# Patient Record
Sex: FEMALE | Race: WHITE | NOT HISPANIC OR LATINO | Employment: UNEMPLOYED | URBAN - NONMETROPOLITAN AREA
[De-identification: names, ages, dates, MRNs, and addresses within clinical notes are randomized per-mention and may not be internally consistent; named-entity substitution may affect disease eponyms.]

---

## 2017-12-06 ENCOUNTER — OFFICE VISIT (OUTPATIENT)
Dept: GASTROENTEROLOGY | Facility: CLINIC | Age: 37
End: 2017-12-06

## 2017-12-06 VITALS
WEIGHT: 195 LBS | HEART RATE: 96 BPM | DIASTOLIC BLOOD PRESSURE: 76 MMHG | BODY MASS INDEX: 38.28 KG/M2 | HEIGHT: 60 IN | SYSTOLIC BLOOD PRESSURE: 129 MMHG

## 2017-12-06 DIAGNOSIS — R14.0 BLOATING: ICD-10-CM

## 2017-12-06 DIAGNOSIS — R11.0 NAUSEA: ICD-10-CM

## 2017-12-06 DIAGNOSIS — R10.84 GENERALIZED ABDOMINAL PAIN: Primary | ICD-10-CM

## 2017-12-06 DIAGNOSIS — K59.04 CHRONIC IDIOPATHIC CONSTIPATION: ICD-10-CM

## 2017-12-06 DIAGNOSIS — R19.4 CHANGE IN BOWEL HABITS: ICD-10-CM

## 2017-12-06 DIAGNOSIS — K92.1 BLOOD IN STOOL: ICD-10-CM

## 2017-12-06 PROCEDURE — 99214 OFFICE O/P EST MOD 30 MIN: CPT | Performed by: NURSE PRACTITIONER

## 2017-12-06 RX ORDER — TRAZODONE HYDROCHLORIDE 150 MG/1
TABLET ORAL
Refills: 2 | COMMUNITY
Start: 2017-12-01 | End: 2018-01-05 | Stop reason: SDUPTHER

## 2017-12-06 RX ORDER — LUBIPROSTONE 8 UG/1
8 CAPSULE ORAL 2 TIMES DAILY WITH MEALS
Qty: 60 CAPSULE | Refills: 5 | Status: SHIPPED | OUTPATIENT
Start: 2017-12-06 | End: 2018-01-05 | Stop reason: SINTOL

## 2017-12-06 RX ORDER — CARBAMAZEPINE 400 MG/1
300 TABLET, EXTENDED RELEASE ORAL 2 TIMES DAILY
COMMUNITY
End: 2020-06-26 | Stop reason: DRUGHIGH

## 2017-12-06 RX ORDER — DEXTROSE AND SODIUM CHLORIDE 5; .45 G/100ML; G/100ML
30 INJECTION, SOLUTION INTRAVENOUS CONTINUOUS PRN
Status: CANCELLED | OUTPATIENT
Start: 2018-02-08

## 2017-12-06 RX ORDER — FLUOXETINE 10 MG/1
10 CAPSULE ORAL DAILY
COMMUNITY
End: 2019-11-12

## 2017-12-06 RX ORDER — MEGESTROL ACETATE 40 MG/1
TABLET ORAL
Refills: 10 | COMMUNITY
Start: 2017-11-22 | End: 2018-01-05 | Stop reason: SDUPTHER

## 2017-12-06 RX ORDER — BUSPIRONE HYDROCHLORIDE 15 MG/1
TABLET ORAL
Refills: 2 | COMMUNITY
Start: 2017-12-03 | End: 2018-01-05 | Stop reason: SDUPTHER

## 2017-12-06 NOTE — PROGRESS NOTES
Chief Complaint   Patient presents with   • Constipation   • Black or Bloody Stool       Subjective    Gosia Brown is a 37 y.o. female. she is being seen for consultation today at the request of SCOTT Hsieh    Constipation   This is a new problem. The current episode started more than 1 month ago. The problem has been gradually worsening since onset. Her stool frequency is 1 time per week or less (reports every 2-3 weeks ). The stool is described as blood tinged and pellet like. The patient is on a high fiber diet. She exercises regularly. There has been adequate water intake. Associated symptoms include abdominal pain, bloating, flatus, hematochezia, hemorrhoids and nausea. Pertinent negatives include no diarrhea, difficulty urinating, fecal incontinence, fever, melena, rectal pain, vomiting or weight loss. She has tried fiber and laxatives for the symptoms. The treatment provided mild relief.   37-year-old female presents to discuss change of bowel movements that started about 6 months ago.  States her bowels have always been somewhat irregular and she would only go about 1 time per week now states it has lessened to about every 2-3 weeks and she is had some loose stool.  States  blood is bright red and is mostly associated with hemorrhoids denies any melena.  Reports she has noticed some light colored mucus in stool.  Wakes up with severe bloating and nausea.  Over the last 6 months she has increased dietary fiber and added a fiber supplement to her regimen daily.  She has always taken stool softener such as Colace as MiraLAX as needed and over-the-counter laxative such as senna and schedule.  These all have provided no relief.  She was seen by care and had an x-ray which showed no signs of obstruction.  Plan; we'll schedule EGD due to epigastric pain noted on exam, nausea and colonoscopy due to changes in bowel habits, abdominal pain, blood and mucus in stool.     The following portions of the  patient's history were reviewed and updated as appropriate:   Past Medical History:   Diagnosis Date   • Anemia    • Anxiety    • Arthritis      Past Surgical History:   Procedure Laterality Date   • EYE SURGERY Right    • TUBAL ABDOMINAL LIGATION       Family History   Problem Relation Age of Onset   • Arthritis Mother    • Hypertension Mother    • Ovarian cancer Mother    • Breast cancer Maternal Aunt    • Colon cancer Maternal Uncle    • Lung cancer Maternal Uncle    • Heart disease Maternal Grandfather    • Stroke Maternal Grandfather    • Lung cancer Maternal Aunt      OB History     No data available        Current Outpatient Prescriptions   Medication Sig Dispense Refill   • busPIRone (BUSPAR) 15 MG tablet TK 1 T PO QID  2   • carBAMazepine XR (TEGretol  XR) 400 MG 12 hr tablet Take 400 mg by mouth 2 (Two) Times a Day.     • FLUoxetine (PROzac) 10 MG capsule Take 10 mg by mouth.     • megestrol (MEGACE) 40 MG tablet TK 1 T PO QD.  10   • traZODone (DESYREL) 150 MG tablet TK 2 TS PO AS DIRECTED  2   • lubiprostone (AMITIZA) 8 MCG capsule Take 1 capsule by mouth 2 (Two) Times a Day With Meals. 60 capsule 5   • polyethylene glycol (GoLYTELY) 236 g solution Starting on day prior to procedure, drink 8 ounces every 30 minutes-half gallon repeat next morning at specified time until gone 4000 mL 0     No current facility-administered medications for this visit.      No Known Allergies  Social History     Social History   • Marital status: Single     Spouse name: N/A   • Number of children: N/A   • Years of education: N/A     Social History Main Topics   • Smoking status: Never Smoker   • Smokeless tobacco: Never Used   • Alcohol use No   • Drug use: No   • Sexual activity: Defer     Other Topics Concern   • None     Social History Narrative       Review of Systems  Review of Systems   Constitutional: Negative for activity change, appetite change, chills, diaphoresis, fatigue, fever, unexpected weight change and  "weight loss.   HENT: Negative for sore throat and trouble swallowing.    Respiratory: Negative for shortness of breath.    Gastrointestinal: Positive for abdominal pain, bloating, blood in stool, constipation, flatus, hematochezia, hemorrhoids and nausea. Negative for abdominal distention, anal bleeding, diarrhea, melena, rectal pain and vomiting.   Genitourinary: Negative for difficulty urinating.   Musculoskeletal: Negative for arthralgias.   Skin: Negative for pallor.   Neurological: Negative for light-headedness.        /76 (BP Location: Right arm, Patient Position: Sitting, Cuff Size: Adult)  Pulse 96  Ht 152.4 cm (60\")  Wt 88.5 kg (195 lb)  BMI 38.08 kg/m2    Objective    Physical Exam   Constitutional: She is oriented to person, place, and time. She appears well-developed and well-nourished. She is cooperative. No distress.   HENT:   Head: Normocephalic and atraumatic.   Neck: Normal range of motion. Neck supple. No thyromegaly present.   Cardiovascular: Normal rate, regular rhythm and normal heart sounds.    Pulmonary/Chest: Effort normal and breath sounds normal. She has no wheezes. She has no rhonchi. She has no rales.   Abdominal: Soft. Normal appearance and bowel sounds are normal. She exhibits no shifting dullness and no distension. There is no hepatosplenomegaly. There is tenderness in the right lower quadrant, epigastric area, periumbilical area and left lower quadrant. There is no rigidity and no guarding. No hernia.   Lymphadenopathy:     She has no cervical adenopathy.   Neurological: She is alert and oriented to person, place, and time.   Skin: Skin is warm, dry and intact. No rash noted. No pallor.   Psychiatric: She has a normal mood and affect. Her speech is normal.     Hospital Outpatient Visit on 06/24/2016   Component Date Value Ref Range Status   • Total Protein 06/24/2016 8.2  6.3 - 8.6 gm/dl Final   • Albumin 06/24/2016 4.7  3.4 - 4.8 gm/dl Final   • Bilirubin, Direct 06/24/2016 " 0.0  0.0 - 0.3 mg/dl Final   • Total Bilirubin 06/24/2016 0.3  0.2 - 1.3 mg/dl Final   • Alkaline Phosphatase 06/24/2016 69  38 - 126 U/L Final   • AST (SGOT) 06/24/2016 22  14 - 36 U/L Final   • ALT (SGPT) 06/24/2016 20  9 - 52 U/L Final   • Sodium 06/24/2016 142  137 - 145 mmol/L Final   • Potassium 06/24/2016 3.5  3.5 - 5.1 mmol/L Final   • Chloride 06/24/2016 99  95 - 110 mmol/L Final   • CO2 06/24/2016 26  22 - 31 mmol/L Final   • Anion Gap 06/24/2016 17.0* 5.0 - 15.0 mmol/L Final   • Glucose 06/24/2016 101* 60 - 100 mg/dl Final   • BUN 06/24/2016 9  7 - 21 mg/dl Final   • Creatinine 06/24/2016 0.7  0.5 - 1.0 mg/dl Final   • GFR MDRD Non  06/24/2016 95  64 - 149 mL/min/1.73 sq.M Final    Comment: Invalid if creatinine is changing or the patient is on dialysis. Use AA  result if patient is -American, non AA result otherwise.     • GFR MDRD  06/24/2016 115  64 - 149 mL/min/1.73 sq.M Final   • Calcium 06/24/2016 9.3  8.4 - 10.2 mg/dl Final   • Total Protein 06/24/2016 8.2  6.3 - 8.6 gm/dl Final   • Albumin 06/24/2016 4.7  3.4 - 4.8 gm/dl Final   • Total Bilirubin 06/24/2016 0.3  0.2 - 1.3 mg/dl Final   • Alkaline Phosphatase 06/24/2016 69  38 - 126 U/L Final   • AST (SGOT) 06/24/2016 22  14 - 36 U/L Final   • ALT (SGPT) 06/24/2016 20  9 - 52 U/L Final   • Valproic Acid 06/24/2016 <10.0* 50.0 - 120.0 ug/ml Final     Assessment/Plan      1. Generalized abdominal pain    2. Nausea    3. Bloating    4. Change in bowel habits    5. Blood in stool    6. Chronic idiopathic constipation    .       Orders placed during this encounter include:      ESOPHAGOGASTRODUODENOSCOPY (N/A), COLONOSCOPY (N/A)    Review and/or summary of lab tests, radiology, procedures, medications. Review and summary of old records and obtaining of history. The risks and benefits of my recommendations, as well as other treatment options were discussed with the patient today. Questions were answered.    New  Medications Ordered This Visit   Medications   • polyethylene glycol (GoLYTELY) 236 g solution     Sig: Starting on day prior to procedure, drink 8 ounces every 30 minutes-half gallon repeat next morning at specified time until gone     Dispense:  4000 mL     Refill:  0   • lubiprostone (AMITIZA) 8 MCG capsule     Sig: Take 1 capsule by mouth 2 (Two) Times a Day With Meals.     Dispense:  60 capsule     Refill:  5       Follow-up: Return in about 4 weeks (around 1/3/2018).          This document has been electronically signed by SCOTT Payan on December 6, 2017 2:29 PM             Results for orders placed or performed during the hospital encounter of 06/24/16   Valproic acid level, total   Result Value Ref Range    Valproic Acid <10.0 (L) 50.0 - 120.0 ug/ml   Hepatic function panel   Result Value Ref Range    Total Protein 8.2 6.3 - 8.6 gm/dl    Albumin 4.7 3.4 - 4.8 gm/dl    Bilirubin, Direct 0.0 0.0 - 0.3 mg/dl    Total Bilirubin 0.3 0.2 - 1.3 mg/dl    Alkaline Phosphatase 69 38 - 126 U/L    AST (SGOT) 22 14 - 36 U/L    ALT (SGPT) 20 9 - 52 U/L   Comprehensive metabolic panel   Result Value Ref Range    Sodium 142 137 - 145 mmol/L    Potassium 3.5 3.5 - 5.1 mmol/L    Chloride 99 95 - 110 mmol/L    CO2 26 22 - 31 mmol/L    Anion Gap 17.0 (H) 5.0 - 15.0 mmol/L    Glucose 101 (H) 60 - 100 mg/dl    BUN 9 7 - 21 mg/dl    Creatinine 0.7 0.5 - 1.0 mg/dl    GFR MDRD Non  95 64 - 149 mL/min/1.73 sq.M    GFR MDRD  115 64 - 149 mL/min/1.73 sq.M    Calcium 9.3 8.4 - 10.2 mg/dl    Total Protein 8.2 6.3 - 8.6 gm/dl    Albumin 4.7 3.4 - 4.8 gm/dl    Total Bilirubin 0.3 0.2 - 1.3 mg/dl    Alkaline Phosphatase 69 38 - 126 U/L    AST (SGOT) 22 14 - 36 U/L    ALT (SGPT) 20 9 - 52 U/L   Results for orders placed or performed during the hospital encounter of 05/17/15   Urinalysis, Microscopic only   Result Value Ref Range    WBC, UA 3-5 0-5,0,RARE,1-2,2-4,3-5  /HPF    RBC, UA 0 0-2,0,RARE  /HPF     Bacteria, UA TRACE 0,TRACE  /HPF    Epithelial Cells, UA 1+ Squamous  /HPF   Rapid drug screen, urine   Result Value Ref Range    Barbiturates Screen, Urine Negative NEGATIVE    Benzodiazepine Screen, Urine Negative NEGATIVE    Opiate Screen, Urine Negative NEGATIVE    THC Screen Interpretation Negative NEGATIVE    Amphet/Methamphet, Screen, Urine Negative NEGATIVE    Cocaine Screen, Urine Negative NEGATIVE    Oxycodone Screen, Urine Negative NEGATIVE    Methadone Screen, Urine Negative NEGATIVE   Pregnancy, urine   Result Value Ref Range    HCG Urine, QL Negative    Urinalysis Without Microscopic   Result Value Ref Range    Color, UA YELLOW     Appearance CLEAR     Specific Gravity, UA 1.007 1.003 - 1.030    pH, UA 8.0 pH Units    Leukocytes, UA 1+ (A) NEGATIVE    Nitrite, UA NEGATIVE NEGATIVE    Protein, UA NEGATIVE NEGATIVE    Glucose, Urine NEGATIVE NEGATIVE mg/dl    Ketones, UA NEGATIVE NEGATIVE    Urobilinogen, UA 0.2 0.2 EU/dl    Blood, UA NEGATIVE NEGATIVE   CBC and Differential   Result Value Ref Range    WBC 7.9 3.2 - 9.8 x1000/uL    RBC 4.66 3.77 - 5.16 andie/mm3    Hemoglobin 10.6 (L) 12.0 - 15.5 gm/dl    Hematocrit 34.3 (L) 35.0 - 45.0 %    MCV 73.6 (L) 80.0 - 98.0 fl    MCH 22.7 (L) 26.0 - 34.0 pg    MCHC 30.9 (L) 31.4 - 36.0 gm/dl    RDW 15.2 (H) 11.5 - 14.5 %    Platelets 420 150 - 450 x1000/mm3    MPV 8.6 8.0 - 12.0 fl    Neutrophil Rel % 62.1 37.0 - 80.0 %    Lymphocyte Rel % 26.4 10.0 - 50.0 %    Monocyte Rel % 9.4 0.0 - 12.0 %    Eosinophil Rel % 1.5 0.0 - 7.0 %    Basophil Rel % 0.5 0.0 - 2.0 %    Immature Granulocyte Rel % 0.10 0.00 - 0.50 %    Neutrophils Absolute 4.88 2.00 - 8.60 x1000/uL    Lymphocytes Absolute 2.08 0.60 - 4.20 x1000/uL    Monocytes Absolute 0.74 0.00 - 0.90 x1000/uL    Eosinophils Absolute 0.12 0.00 - 0.70 x1000/uL    Basophils Absolute 0.04 0.00 - 0.20 x1000/uL    Immature Granulocytes Absolute 0.010 0.005 - 0.022 x1000/uL   Comprehensive metabolic panel   Result Value  Ref Range    Sodium 138 137 - 145 mmol/L    Potassium 4.0 3.5 - 5.1 mmol/L    Chloride 100 95 - 110 mmol/L    CO2 26 22 - 31 mmol/L    Anion Gap 12.0 5.0 - 15.0 mmol/L    Glucose 82 60 - 100 mg/dl    BUN 6 (L) 7 - 21 mg/dl    Creatinine 0.7 0.5 - 1.0 mg/dl    GFR MDRD Non  95 64 - 149 mL/min/1.73 sq.M    GFR MDRD  115 64 - 149 mL/min/1.73 sq.M    Calcium 9.0 8.4 - 10.2 mg/dl    Total Protein 8.4 6.3 - 8.6 gm/dl    Albumin 4.8 3.4 - 4.8 gm/dl    Total Bilirubin 0.6 0.2 - 1.3 mg/dl    Alkaline Phosphatase 61 38 - 126 U/L    AST (SGOT) 40 (H) 14 - 36 U/L    ALT (SGPT) 17 9 - 52 U/L   Results for orders placed or performed during the hospital encounter of 08/02/14   Urinalysis, Microscopic only   Result Value Ref Range    WBC, UA 1-2 0-5,0,RARE,1-2,2-4,3-5  /HPF    RBC, UA 0 0-2,0,RARE  /HPF    Bacteria, UA 0 0,TRACE  /HPF   Rapid drug screen, urine   Result Value Ref Range    Barbiturates Screen, Urine Negative NEGATIVE    Benzodiazepine Screen, Urine Negative NEGATIVE    Opiate Screen, Urine Negative NEGATIVE    THC Screen Interpretation Negative NEGATIVE    Amphet/Methamphet, Screen, Urine Negative NEGATIVE    Cocaine Screen, Urine Negative NEGATIVE    Oxycodone Screen, Urine Negative NEGATIVE    Methadone Screen, Urine Negative NEGATIVE   Pregnancy, urine   Result Value Ref Range    HCG Urine, QL Negative      *Note: Due to a large number of results and/or encounters for the requested time period, some results have not been displayed. A complete set of results can be found in Results Review.

## 2017-12-12 ENCOUNTER — TELEPHONE (OUTPATIENT)
Dept: GASTROENTEROLOGY | Facility: CLINIC | Age: 37
End: 2017-12-12

## 2017-12-12 NOTE — TELEPHONE ENCOUNTER
12/12/2017, 1623 - Patient's pharmacy of choice, [place], [place], [phone] telephoned per this staff member with notification of patient, Gosia Brown, 1980, Prior Authorization approval received via facsimile [email or web address] Amitiza 8 MCG Capsules with approved quantity of 60 per 30 days and approved time frame 12/06/2017 - further notice.  Spoke with Summer.  Understanding verbalized with statement per Summer prescription medication had been successfully processed.    12/12/2017, 1625 - Patient telephoned per this staff member [phone] with notification of Prior Authorization approval received via facsimile per [facility] regarding Amitiza 8 MCG Capsules with approved quantity of 60 per 30 days and approved time frame 12/06/2017 - further notice.  Understanding verbalized with patient statement she will retreive prescription medication tomorrow morning, Wednesday, December 13, 2017.

## 2018-01-19 ENCOUNTER — TELEPHONE (OUTPATIENT)
Dept: GASTROENTEROLOGY | Facility: CLINIC | Age: 38
End: 2018-01-19

## 2018-01-19 NOTE — TELEPHONE ENCOUNTER
----- Message from Lata Angeles MA sent at 1/19/2018  9:26 AM CST -----  Contact: [phone]  Patient states she called 3 weeks ago and left a message for someone to call her back regarding her Amitiza and she has not had a return call. She states that the medication was not working and was making her very sick when she did take it. Patient would like a call back. She rescheduled her office appt that was scheduled Monday because her endo procedure was rescheduled until 2-8-18 due to the weather.

## 2018-01-19 NOTE — TELEPHONE ENCOUNTER
Spoke with patient in regards to her calling.  I notified patient of who I was where I was calling from. I then notified patient after to speaking with lavon CHAVEZ we would send in some Linzess 145 to her pharmacy. I verified patient pharmacy and notified her to inform us of her feedback in the Linzess. Patient verbalized her understanding and had no further questions at the moment.

## 2018-01-22 ENCOUNTER — APPOINTMENT (OUTPATIENT)
Dept: LAB | Facility: HOSPITAL | Age: 38
End: 2018-01-22

## 2018-01-22 ENCOUNTER — TRANSCRIBE ORDERS (OUTPATIENT)
Dept: LAB | Facility: HOSPITAL | Age: 38
End: 2018-01-22

## 2018-01-22 DIAGNOSIS — Z79.899 ENCOUNTER FOR LONG-TERM (CURRENT) USE OF HIGH-RISK MEDICATION: Primary | ICD-10-CM

## 2018-01-22 LAB
ALBUMIN SERPL-MCNC: 4.3 G/DL (ref 3.4–4.8)
ALP SERPL-CCNC: 94 U/L (ref 38–126)
ALT SERPL W P-5'-P-CCNC: 37 U/L (ref 9–52)
AST SERPL-CCNC: 35 U/L (ref 14–36)
BILIRUB CONJ SERPL-MCNC: 0 MG/DL (ref 0–0.3)
BILIRUB INDIRECT SERPL-MCNC: 0 MG/DL (ref 0–1.1)
BILIRUB SERPL-MCNC: 0.1 MG/DL (ref 0.2–1.3)
CARBAMAZEPINE SERPL-MCNC: 6.6 MCG/ML (ref 4–12)
PROT SERPL-MCNC: 7.6 G/DL (ref 6.3–8.6)

## 2018-01-22 PROCEDURE — 36415 COLL VENOUS BLD VENIPUNCTURE: CPT | Performed by: NURSE PRACTITIONER

## 2018-01-22 PROCEDURE — 80156 ASSAY CARBAMAZEPINE TOTAL: CPT | Performed by: NURSE PRACTITIONER

## 2018-01-22 PROCEDURE — 80076 HEPATIC FUNCTION PANEL: CPT | Performed by: NURSE PRACTITIONER

## 2018-02-19 ENCOUNTER — DOCUMENTATION (OUTPATIENT)
Dept: GASTROENTEROLOGY | Facility: CLINIC | Age: 38
End: 2018-02-19

## 2018-02-19 ENCOUNTER — TELEPHONE (OUTPATIENT)
Dept: GASTROENTEROLOGY | Facility: CLINIC | Age: 38
End: 2018-02-19

## 2018-02-19 NOTE — TELEPHONE ENCOUNTER
02/19/2018, 0932 - Patient's pharmacy of choice, [facility] [place], [place] (270) telephoned per this staff member [phone].  Spoke with Pharmacy TechnicianOndina.  Prescription medication, Linzess 145 MCG, non-formulary.      02/19/2018, 0938 - Patient telephoned per this staff member [phone].  Zero answer.  Voice message submitted with date, time, office contact information, and instruction to return this staff member's telephone call.    02/19/2018, 1146 - Prior Authorization request regarding Linzess 145 MCG resubmitted via Cover My Meds.

## 2018-02-20 RX ORDER — LACTULOSE 10 G/15ML
10 SOLUTION ORAL 2 TIMES DAILY PRN
Qty: 900 ML | Refills: 1 | Status: SHIPPED | OUTPATIENT
Start: 2018-02-20 | End: 2019-07-12

## 2018-03-07 ENCOUNTER — TELEPHONE (OUTPATIENT)
Dept: GASTROENTEROLOGY | Facility: CLINIC | Age: 38
End: 2018-03-07

## 2018-03-07 NOTE — TELEPHONE ENCOUNTER
"03/07/2018, 1046 - Patient telephoned per this staff member [phone] to ensure patient aware of Prior Authorization denial regarding prescription medication, Linzess 145 MCG Capsule. Facsimile received per [facility] [employer] stating patient must show trial and failure with preferred drug Lactulose (Enulose).  Lactulose 10 GM / 15 ML Solution, 15 ML by mouth 2 times per day as needed for Constipation submitted via E-Scribe per office of SCOTT Dudley [facility] [place] [place]  Patient stated she collected prescription medication, Lactulose, from pharmacy \"last week\".  "

## 2019-01-22 ENCOUNTER — TRANSCRIBE ORDERS (OUTPATIENT)
Dept: ORTHOPEDIC SURGERY | Facility: CLINIC | Age: 39
End: 2019-01-22

## 2019-01-22 DIAGNOSIS — M25.562 ACUTE PAIN OF LEFT KNEE: Primary | ICD-10-CM

## 2019-03-11 ENCOUNTER — TRANSCRIBE ORDERS (OUTPATIENT)
Dept: LAB | Facility: OTHER | Age: 39
End: 2019-03-11

## 2019-03-11 ENCOUNTER — TRANSCRIBE ORDERS (OUTPATIENT)
Dept: GENERAL RADIOLOGY | Facility: CLINIC | Age: 39
End: 2019-03-11

## 2019-03-11 ENCOUNTER — LAB (OUTPATIENT)
Dept: LAB | Facility: OTHER | Age: 39
End: 2019-03-11

## 2019-03-11 DIAGNOSIS — Z79.899 ENCOUNTER FOR LONG-TERM (CURRENT) USE OF HIGH-RISK MEDICATION: Primary | ICD-10-CM

## 2019-03-11 DIAGNOSIS — Z79.899 ENCOUNTER FOR LONG-TERM (CURRENT) USE OF HIGH-RISK MEDICATION: ICD-10-CM

## 2019-03-11 DIAGNOSIS — R51.9 NONINTRACTABLE HEADACHE, UNSPECIFIED CHRONICITY PATTERN, UNSPECIFIED HEADACHE TYPE: Primary | ICD-10-CM

## 2019-03-11 LAB
ALBUMIN SERPL-MCNC: 4.5 G/DL (ref 3.5–5)
ALBUMIN/GLOB SERPL: 1.4 G/DL (ref 1.1–1.8)
ALP SERPL-CCNC: 80 U/L (ref 38–126)
ALT SERPL W P-5'-P-CCNC: 14 U/L
AMPHET+METHAMPHET UR QL: NEGATIVE
ANION GAP SERPL CALCULATED.3IONS-SCNC: 9 MMOL/L (ref 5–15)
AST SERPL-CCNC: 18 U/L (ref 14–36)
BARBITURATES UR QL SCN: NEGATIVE
BENZODIAZ UR QL SCN: NEGATIVE
BILIRUB CONJ SERPL-MCNC: <0 MG/DL (ref 0–0.3)
BILIRUB SERPL-MCNC: 0.2 MG/DL (ref 0.2–1.3)
BILIRUB UR QL STRIP: NEGATIVE
BUN BLD-MCNC: 13 MG/DL (ref 7–17)
BUN/CREAT SERPL: 16.9 (ref 7–25)
CALCIUM SPEC-SCNC: 8.9 MG/DL (ref 8.4–10.2)
CANNABINOIDS SERPL QL: NEGATIVE
CARBAMAZEPINE SERPL-MCNC: 6.1 MCG/ML (ref 4–12)
CHLORIDE SERPL-SCNC: 101 MMOL/L (ref 98–107)
CHOLEST SERPL-MCNC: 201 MG/DL (ref 150–200)
CLARITY UR: CLEAR
CO2 SERPL-SCNC: 29 MMOL/L (ref 22–30)
COCAINE UR QL: NEGATIVE
COLOR UR: YELLOW
CREAT BLD-MCNC: 0.77 MG/DL (ref 0.52–1.04)
GFR SERPL CREATININE-BSD FRML MDRD: 83 ML/MIN/1.73 (ref 64–149)
GLOBULIN UR ELPH-MCNC: 3.3 GM/DL (ref 2.3–3.5)
GLUCOSE BLD-MCNC: 103 MG/DL (ref 74–99)
GLUCOSE UR STRIP-MCNC: NEGATIVE MG/DL
HDLC SERPL-MCNC: 50 MG/DL (ref 40–59)
HGB UR QL STRIP.AUTO: NEGATIVE
KETONES UR QL STRIP: ABNORMAL
LDLC SERPL CALC-MCNC: 103 MG/DL
LDLC/HDLC SERPL: 2.07 {RATIO} (ref 0–3.22)
LEUKOCYTE ESTERASE UR QL STRIP.AUTO: NEGATIVE
LITHIUM SERPL-SCNC: <0.2 MMOL/L (ref 0.6–1.2)
METHADONE UR QL SCN: NEGATIVE
NITRITE UR QL STRIP: NEGATIVE
OPIATES UR QL: NEGATIVE
OXYCODONE UR QL SCN: NEGATIVE
PH UR STRIP.AUTO: 6 [PH] (ref 5.5–8)
POTASSIUM BLD-SCNC: 3.5 MMOL/L (ref 3.4–5)
PROT SERPL-MCNC: 7.8 G/DL (ref 6.3–8.2)
PROT UR QL STRIP: NEGATIVE
SODIUM BLD-SCNC: 139 MMOL/L (ref 137–145)
SP GR UR STRIP: >=1.03 (ref 1–1.03)
TRIGL SERPL-MCNC: 238 MG/DL
UROBILINOGEN UR QL STRIP: ABNORMAL
VLDLC SERPL-MCNC: 47.6 MG/DL

## 2019-03-11 PROCEDURE — 80053 COMPREHEN METABOLIC PANEL: CPT | Performed by: INTERNAL MEDICINE

## 2019-03-11 PROCEDURE — 80307 DRUG TEST PRSMV CHEM ANLYZR: CPT | Performed by: NURSE PRACTITIONER

## 2019-03-11 PROCEDURE — 80156 ASSAY CARBAMAZEPINE TOTAL: CPT | Performed by: NURSE PRACTITIONER

## 2019-03-11 PROCEDURE — 81003 URINALYSIS AUTO W/O SCOPE: CPT | Performed by: INTERNAL MEDICINE

## 2019-03-11 PROCEDURE — 80178 ASSAY OF LITHIUM: CPT | Performed by: NURSE PRACTITIONER

## 2019-03-11 PROCEDURE — 80061 LIPID PANEL: CPT | Performed by: INTERNAL MEDICINE

## 2019-03-11 PROCEDURE — 80165 DIPROPYLACETIC ACID FREE: CPT | Performed by: NURSE PRACTITIONER

## 2019-03-11 PROCEDURE — 84443 ASSAY THYROID STIM HORMONE: CPT | Performed by: NURSE PRACTITIONER

## 2019-03-11 PROCEDURE — 82248 BILIRUBIN DIRECT: CPT | Performed by: INTERNAL MEDICINE

## 2019-03-12 LAB — TSH SERPL DL<=0.05 MIU/L-ACNC: 1.4 MIU/ML (ref 0.46–4.68)

## 2019-03-14 LAB — VALPROATE FREE SERPL-MCNC: 2.2 UG/ML (ref 6–22)

## 2019-06-20 ENCOUNTER — OFFICE VISIT (OUTPATIENT)
Dept: ORTHOPEDIC SURGERY | Facility: CLINIC | Age: 39
End: 2019-06-20

## 2019-06-20 VITALS — BODY MASS INDEX: 37.3 KG/M2 | WEIGHT: 190 LBS | HEIGHT: 60 IN

## 2019-06-20 DIAGNOSIS — M23.91 INTERNAL DERANGEMENT OF RIGHT KNEE: Primary | ICD-10-CM

## 2019-06-20 DIAGNOSIS — M25.561 RIGHT KNEE PAIN, UNSPECIFIED CHRONICITY: ICD-10-CM

## 2019-06-20 PROCEDURE — 99214 OFFICE O/P EST MOD 30 MIN: CPT | Performed by: NURSE PRACTITIONER

## 2019-06-20 RX ORDER — MELOXICAM 15 MG/1
15 TABLET ORAL DAILY
Qty: 30 TABLET | Refills: 1 | Status: SHIPPED | OUTPATIENT
Start: 2019-06-20 | End: 2019-07-12

## 2019-06-20 NOTE — PROGRESS NOTES
"Gosia Brown is a 38 y.o. female returns for     Chief Complaint   Patient presents with   • Right Knee - Follow-up       HISTORY OF PRESENT ILLNESS:    38-year-old  female in the office today for right follow-up right knee pain.  She does report worsening of symptoms.  Originally I saw the patient 2016 for the right knee pain and she received intra-articular injections of steroids, physical therapy, modified weightbearing and activity modification over the past several years to control her pain however now these treatments are not helping.       CONCURRENT MEDICAL HISTORY:    The following portions of the patient's history were reviewed and updated as appropriate: allergies, current medications, past family history, past medical history, past social history, past surgical history and problem list.     ROS  No fevers or chills.  No chest pain or shortness of air.  No GI or  disturbances.    PHYSICAL EXAMINATION:       Ht 152.4 cm (60\")   Wt 86.2 kg (190 lb)   BMI 37.11 kg/m²     Physical Exam   Constitutional: She is oriented to person, place, and time. Vital signs are normal. She appears well-developed and well-nourished. She is cooperative.   HENT:   Head: Normocephalic and atraumatic.   Neck: Trachea normal and phonation normal.   Pulmonary/Chest: Effort normal. No respiratory distress.   Abdominal: Soft. Normal appearance. She exhibits no distension.   Musculoskeletal:        Right knee: She exhibits effusion.   Neurological: She is alert and oriented to person, place, and time. GCS eye subscore is 4. GCS verbal subscore is 5. GCS motor subscore is 6.   Skin: Skin is warm, dry and intact.   Psychiatric: She has a normal mood and affect. Her speech is normal and behavior is normal. Judgment and thought content normal. Cognition and memory are normal.   Vitals reviewed.      GAIT:     []  Normal  [x]  Antalgic    Assistive device: []  None  []  Walker     []  Crutches  []  Cane     []  " Wheelchair  []  Stretcher    Right Knee Exam     Tenderness   The patient is experiencing tenderness in the lateral joint line and medial joint line.    Range of Motion   Extension: abnormal   Flexion: abnormal     Tests   Bala:  Medial - positive Lateral - positive    Other   Erythema: absent  Scars: absent  Sensation: normal  Pulse: present  Swelling: mild  Effusion: effusion present      Left Knee Exam   Left knee exam is normal.    Muscle Strength   The patient has normal left knee strength.              No results found.          ASSESSMENT:    Diagnoses and all orders for this visit:    Internal derangement of right knee  -     MRI Knee Right Without Contrast; Future    Right knee pain, unspecified chronicity  -     MRI Knee Right Without Contrast; Future    Other orders  -     meloxicam (MOBIC) 15 MG tablet; Take 1 tablet by mouth Daily for 30 days.          PLAN  Continue to sharp stabbing pain in the medial aspect of the knee with limited range of motion and swelling.  Will recommend an MRI to rule out a meniscus tear.      No Follow-up on file.    Shan Dudley, APRN

## 2019-07-01 ENCOUNTER — HOSPITAL ENCOUNTER (OUTPATIENT)
Dept: MRI IMAGING | Facility: HOSPITAL | Age: 39
Discharge: HOME | End: 2019-07-01
Admitting: NURSE PRACTITIONER

## 2019-07-01 DIAGNOSIS — M23.91 INTERNAL DERANGEMENT OF RIGHT KNEE: ICD-10-CM

## 2019-07-01 DIAGNOSIS — M25.561 RIGHT KNEE PAIN, UNSPECIFIED CHRONICITY: ICD-10-CM

## 2019-07-01 PROCEDURE — 73721 MRI JNT OF LWR EXTRE W/O DYE: CPT

## 2019-07-12 ENCOUNTER — OFFICE VISIT (OUTPATIENT)
Dept: ORTHOPEDIC SURGERY | Facility: CLINIC | Age: 39
End: 2019-07-12

## 2019-07-12 VITALS — BODY MASS INDEX: 39.07 KG/M2 | WEIGHT: 199 LBS | HEIGHT: 60 IN

## 2019-07-12 DIAGNOSIS — M94.261 CHONDROMALACIA, KNEE, RIGHT: Primary | ICD-10-CM

## 2019-07-12 DIAGNOSIS — M25.561 RIGHT KNEE PAIN, UNSPECIFIED CHRONICITY: ICD-10-CM

## 2019-07-12 PROBLEM — M23.91 INTERNAL DERANGEMENT OF RIGHT KNEE: Status: ACTIVE | Noted: 2019-07-12

## 2019-07-12 PROCEDURE — 20610 DRAIN/INJ JOINT/BURSA W/O US: CPT | Performed by: NURSE PRACTITIONER

## 2019-07-12 PROCEDURE — 99214 OFFICE O/P EST MOD 30 MIN: CPT | Performed by: NURSE PRACTITIONER

## 2019-07-12 RX ADMIN — LIDOCAINE HYDROCHLORIDE 2 ML: 10 INJECTION, SOLUTION EPIDURAL; INFILTRATION; INTRACAUDAL; PERINEURAL at 14:10

## 2019-07-12 RX ADMIN — TRIAMCINOLONE ACETONIDE 80 MG: 40 INJECTION, SUSPENSION INTRA-ARTICULAR; INTRAMUSCULAR at 14:10

## 2019-07-12 NOTE — PROGRESS NOTES
"Gosia Brown is a 38 y.o. female returns for     Chief Complaint   Patient presents with   • Right Knee - Follow-up, Pain   • Results     mri right knee done on 7/1/2019       HISTORY OF PRESENT ILLNESS:  Follow up today after mri of the right knee for further evaluation of pain.    Symptoms have failed to improved despite, nsaids, rest and bracing.         CONCURRENT MEDICAL HISTORY:    The following portions of the patient's history were reviewed and updated as appropriate: allergies, current medications, past family history, past medical history, past social history, past surgical history and problem list.     ROS  No fevers or chills.  No chest pain or shortness of air.  No GI or  disturbances.    PHYSICAL EXAMINATION:       Ht 152.4 cm (60\")   Wt 90.3 kg (199 lb)   BMI 38.86 kg/m²     Physical Exam   Constitutional: She is oriented to person, place, and time. Vital signs are normal. She appears well-developed and well-nourished. She is cooperative.   HENT:   Head: Normocephalic and atraumatic.   Neck: Trachea normal and phonation normal.   Pulmonary/Chest: Effort normal. No respiratory distress.   Abdominal: Soft. Normal appearance. She exhibits no distension.   Musculoskeletal:        Right knee: She exhibits effusion.   Neurological: She is alert and oriented to person, place, and time. GCS eye subscore is 4. GCS verbal subscore is 5. GCS motor subscore is 6.   Skin: Skin is warm, dry and intact.   Psychiatric: She has a normal mood and affect. Her speech is normal and behavior is normal. Judgment and thought content normal. Cognition and memory are normal.   Vitals reviewed.      GAIT:     []  Normal  [x]  Antalgic    Assistive device: []  None  []  Walker     []  Crutches  []  Cane     []  Wheelchair  []  Stretcher    Right Knee Exam     Tenderness   The patient is experiencing tenderness in the lateral joint line and medial joint line.    Range of Motion   Extension: abnormal   Flexion: " abnormal     Tests   Bala:  Medial - positive Lateral - positive    Other   Erythema: absent  Scars: absent  Sensation: normal  Pulse: present  Swelling: mild  Effusion: effusion present      Left Knee Exam   Left knee exam is normal.    Muscle Strength   The patient has normal left knee strength.              Mri Knee Right Without Contrast    Result Date: 7/1/2019  Narrative: MRI right knee without contrast on 7/1/2019 CLINICAL INDICATION: Fell off ladder three years ago, knee pain since then that is worsening, swelling and popping TECHNIQUE: Multiplanar, multisequence MR images are obtained throughout the right knee without the administration of contrast. This examination was performed on a 1.5 Luly magnet. COMPARISON: Prior MRI from 7/21/2016 and x-ray from 8/24/2016 FINDINGS: There is a very small joint effusion. There is no Baker's cyst. Popliteus tendon is intact and unremarkable. There is mild chondromalacia without underlying reactive marrow change in the lateral patellar facet. There is some red marrow hyperplasia in the distal femur likely related to the patient's age and sex and weight but would recommend correlation with CBC to exclude anemia. The MCL and lateral collateral ligament complex are intact and unremarkable. The patellar and quadriceps tendons are intact and unremarkable. The ACL and PCL are intact and unremarkable. Bone marrow signal is otherwise unremarkable. No other cartilage abnormality is noted. The medial and lateral menisci are intact and unremarkable without evidence of a tear.     Impression: 1. Small joint effusion. 2. Mild red marrow hyperplasia in the distal femur likely just related to the patient's age and sex and weight but please correlate with CBC to exclude anemia. 3. Mild chondromalacia in the lateral patellar facet. Electronically signed by:  Shaun Lee  7/1/2019 9:11 PM CDT Workstation: [id number]      Large Joint Arthrocentesis: R knee  Date/Time: 7/12/2019  2:10 PM  Consent given by: patient  Site marked: site marked  Timeout: Immediately prior to procedure a time out was called to verify the correct patient, procedure, equipment, support staff and site/side marked as required   Supporting Documentation  Indications: pain   Procedure Details  Location: knee - R knee  Preparation: Patient was prepped and draped in the usual sterile fashion  Needle size: 22 G  Approach: anteromedial  Medications administered: 2 mL lidocaine PF 1% 1 %; 80 mg triamcinolone acetonide 40 MG/ML  Patient tolerance: patient tolerated the procedure well with no immediate complications            ASSESSMENT:    Diagnoses and all orders for this visit:    Chondromalacia, knee, right  -     Ambulatory Referral to Physical Therapy Evaluate and treat  -     Large Joint Arthrocentesis: R knee  -     lidocaine PF 1% (XYLOCAINE) injection 2 mL  -     triamcinolone acetonide (KENALOG-40) injection 80 mg    Right knee pain, unspecified chronicity  -     Ambulatory Referral to Physical Therapy Evaluate and treat  -     Large Joint Arthrocentesis: R knee  -     lidocaine PF 1% (XYLOCAINE) injection 2 mL  -     triamcinolone acetonide (KENALOG-40) injection 80 mg    Other orders  -     diclofenac (VOLTAREN) 50 MG EC tablet; Take 1 tablet by mouth 3 (Three) Times a Day for 30 days.          PLAN  Reviewed the mri results with the patient and discussed findings in detail    Recommended aggressive weight reduction  HEP/PT   Activity modification   Discussed knee injection in detail and the patient will and ready to proceed  Follow up in one month for recheck       No Follow-up on file.    Shan Dudley, APRN

## 2019-07-14 RX ORDER — TRIAMCINOLONE ACETONIDE 40 MG/ML
80 INJECTION, SUSPENSION INTRA-ARTICULAR; INTRAMUSCULAR
Status: COMPLETED | OUTPATIENT
Start: 2019-07-12 | End: 2019-07-12

## 2019-07-14 RX ORDER — LIDOCAINE HYDROCHLORIDE 10 MG/ML
2 INJECTION, SOLUTION EPIDURAL; INFILTRATION; INTRACAUDAL; PERINEURAL
Status: COMPLETED | OUTPATIENT
Start: 2019-07-12 | End: 2019-07-12

## 2019-10-24 ENCOUNTER — HOSPITAL ENCOUNTER (EMERGENCY)
Facility: HOSPITAL | Age: 39
Discharge: HOME | End: 2019-10-25
Attending: EMERGENCY MEDICINE | Admitting: EMERGENCY MEDICINE

## 2019-10-24 ENCOUNTER — APPOINTMENT (OUTPATIENT)
Dept: CT IMAGING | Facility: HOSPITAL | Age: 39
End: 2019-10-24

## 2019-10-24 ENCOUNTER — APPOINTMENT (OUTPATIENT)
Dept: GENERAL RADIOLOGY | Facility: HOSPITAL | Age: 39
End: 2019-10-24

## 2019-10-24 DIAGNOSIS — R07.89 ATYPICAL CHEST PAIN: Primary | ICD-10-CM

## 2019-10-24 LAB
ALBUMIN SERPL-MCNC: 4 G/DL (ref 3.5–5.2)
ALBUMIN/GLOB SERPL: 1.4 G/DL
ALP SERPL-CCNC: 66 U/L (ref 39–117)
ALT SERPL W P-5'-P-CCNC: 9 U/L (ref 1–33)
ANION GAP SERPL CALCULATED.3IONS-SCNC: 12 MMOL/L (ref 5–15)
AST SERPL-CCNC: 11 U/L (ref 1–32)
BASOPHILS # BLD AUTO: 0.04 10*3/MM3 (ref 0–0.2)
BASOPHILS NFR BLD AUTO: 0.4 % (ref 0–1.5)
BILIRUB SERPL-MCNC: <0.2 MG/DL (ref 0.2–1.2)
BUN BLD-MCNC: 10 MG/DL (ref 6–20)
BUN/CREAT SERPL: 9.9 (ref 7–25)
CALCIUM SPEC-SCNC: 8.5 MG/DL (ref 8.6–10.5)
CHLORIDE SERPL-SCNC: 104 MMOL/L (ref 98–107)
CO2 SERPL-SCNC: 25 MMOL/L (ref 22–29)
CREAT BLD-MCNC: 1.01 MG/DL (ref 0.57–1)
D-DIMER, QUANTITATIVE (MAD,POW, STR): 567 NG/ML (FEU) (ref 0–470)
DEPRECATED RDW RBC AUTO: 46.5 FL (ref 37–54)
EOSINOPHIL # BLD AUTO: 0.22 10*3/MM3 (ref 0–0.4)
EOSINOPHIL NFR BLD AUTO: 2.4 % (ref 0.3–6.2)
ERYTHROCYTE [DISTWIDTH] IN BLOOD BY AUTOMATED COUNT: 17.5 % (ref 12.3–15.4)
GFR SERPL CREATININE-BSD FRML MDRD: 61 ML/MIN/1.73
GLOBULIN UR ELPH-MCNC: 2.8 GM/DL
GLUCOSE BLD-MCNC: 139 MG/DL (ref 65–99)
HCG SERPL QL: NEGATIVE
HCT VFR BLD AUTO: 32.3 % (ref 34–46.6)
HGB BLD-MCNC: 9.8 G/DL (ref 12–15.9)
HOLD SPECIMEN: NORMAL
IMM GRANULOCYTES # BLD AUTO: 0.04 10*3/MM3 (ref 0–0.05)
IMM GRANULOCYTES NFR BLD AUTO: 0.4 % (ref 0–0.5)
LARGE PLATELETS: NORMAL
LYMPHOCYTES # BLD AUTO: 3.15 10*3/MM3 (ref 0.7–3.1)
LYMPHOCYTES NFR BLD AUTO: 34.9 % (ref 19.6–45.3)
MCH RBC QN AUTO: 22.4 PG (ref 26.6–33)
MCHC RBC AUTO-ENTMCNC: 30.3 G/DL (ref 31.5–35.7)
MCV RBC AUTO: 73.7 FL (ref 79–97)
MICROCYTES BLD QL: NORMAL
MONOCYTES # BLD AUTO: 0.67 10*3/MM3 (ref 0.1–0.9)
MONOCYTES NFR BLD AUTO: 7.4 % (ref 5–12)
NEUTROPHILS # BLD AUTO: 4.91 10*3/MM3 (ref 1.7–7)
NEUTROPHILS NFR BLD AUTO: 54.5 % (ref 42.7–76)
NRBC BLD AUTO-RTO: 0 /100 WBC (ref 0–0.2)
NT-PROBNP SERPL-MCNC: 70.5 PG/ML (ref 5–450)
OVALOCYTES BLD QL SMEAR: NORMAL
PLATELET # BLD AUTO: 385 10*3/MM3 (ref 140–450)
PMV BLD AUTO: 8.9 FL (ref 6–12)
POLYCHROMASIA BLD QL SMEAR: NORMAL
POTASSIUM BLD-SCNC: 3.7 MMOL/L (ref 3.5–5.2)
PROT SERPL-MCNC: 6.8 G/DL (ref 6–8.5)
RBC # BLD AUTO: 4.38 10*6/MM3 (ref 3.77–5.28)
SODIUM BLD-SCNC: 141 MMOL/L (ref 136–145)
TROPONIN T SERPL-MCNC: <0.01 NG/ML (ref 0–0.03)
WBC MORPH BLD: NORMAL
WBC NRBC COR # BLD: 9.03 10*3/MM3 (ref 3.4–10.8)
WHOLE BLOOD HOLD SPECIMEN: NORMAL
WHOLE BLOOD HOLD SPECIMEN: NORMAL

## 2019-10-24 PROCEDURE — 85025 COMPLETE CBC W/AUTO DIFF WBC: CPT

## 2019-10-24 PROCEDURE — 83880 ASSAY OF NATRIURETIC PEPTIDE: CPT

## 2019-10-24 PROCEDURE — 93010 ELECTROCARDIOGRAM REPORT: CPT | Performed by: INTERNAL MEDICINE

## 2019-10-24 PROCEDURE — 71275 CT ANGIOGRAPHY CHEST: CPT

## 2019-10-24 PROCEDURE — 80053 COMPREHEN METABOLIC PANEL: CPT

## 2019-10-24 PROCEDURE — 96374 THER/PROPH/DIAG INJ IV PUSH: CPT

## 2019-10-24 PROCEDURE — 93005 ELECTROCARDIOGRAM TRACING: CPT

## 2019-10-24 PROCEDURE — 85379 FIBRIN DEGRADATION QUANT: CPT | Performed by: EMERGENCY MEDICINE

## 2019-10-24 PROCEDURE — 99284 EMERGENCY DEPT VISIT MOD MDM: CPT

## 2019-10-24 PROCEDURE — 71046 X-RAY EXAM CHEST 2 VIEWS: CPT

## 2019-10-24 PROCEDURE — 36415 COLL VENOUS BLD VENIPUNCTURE: CPT

## 2019-10-24 PROCEDURE — 84484 ASSAY OF TROPONIN QUANT: CPT

## 2019-10-24 PROCEDURE — 84703 CHORIONIC GONADOTROPIN ASSAY: CPT

## 2019-10-24 PROCEDURE — 85007 BL SMEAR W/DIFF WBC COUNT: CPT

## 2019-10-24 PROCEDURE — 25010000002 KETOROLAC TROMETHAMINE PER 15 MG: Performed by: EMERGENCY MEDICINE

## 2019-10-24 PROCEDURE — 93005 ELECTROCARDIOGRAM TRACING: CPT | Performed by: EMERGENCY MEDICINE

## 2019-10-24 RX ORDER — SODIUM CHLORIDE 0.9 % (FLUSH) 0.9 %
10 SYRINGE (ML) INJECTION AS NEEDED
Status: DISCONTINUED | OUTPATIENT
Start: 2019-10-24 | End: 2019-10-25 | Stop reason: HOSPADM

## 2019-10-24 RX ORDER — ASPIRIN 325 MG
325 TABLET ORAL ONCE
Status: COMPLETED | OUTPATIENT
Start: 2019-10-24 | End: 2019-10-24

## 2019-10-24 RX ORDER — KETOROLAC TROMETHAMINE 30 MG/ML
30 INJECTION, SOLUTION INTRAMUSCULAR; INTRAVENOUS ONCE
Status: COMPLETED | OUTPATIENT
Start: 2019-10-24 | End: 2019-10-24

## 2019-10-24 RX ADMIN — ASPIRIN 325 MG: 325 TABLET ORAL at 22:58

## 2019-10-24 RX ADMIN — KETOROLAC TROMETHAMINE 30 MG: 30 INJECTION, SOLUTION INTRAMUSCULAR; INTRAVENOUS at 22:59

## 2019-10-25 VITALS
WEIGHT: 201 LBS | DIASTOLIC BLOOD PRESSURE: 72 MMHG | SYSTOLIC BLOOD PRESSURE: 114 MMHG | HEART RATE: 69 BPM | TEMPERATURE: 98.2 F | RESPIRATION RATE: 18 BRPM | HEIGHT: 60 IN | BODY MASS INDEX: 39.46 KG/M2 | OXYGEN SATURATION: 99 %

## 2019-10-25 LAB — TROPONIN T SERPL-MCNC: <0.01 NG/ML (ref 0–0.03)

## 2019-10-25 PROCEDURE — 0 IOPAMIDOL PER 1 ML: Performed by: EMERGENCY MEDICINE

## 2019-10-25 PROCEDURE — 84484 ASSAY OF TROPONIN QUANT: CPT | Performed by: EMERGENCY MEDICINE

## 2019-10-25 RX ADMIN — IOPAMIDOL 63 ML: 755 INJECTION, SOLUTION INTRAVENOUS at 00:15

## 2019-10-25 NOTE — ED PROVIDER NOTES
Subjective   38 years old female presented in the ER with chief complaint of left-sided chest pain with radiation to left arm since 7 PM, aggravated with movements of shoulder, minimal shortness of breath initially but improved.  Denies any palpitations.  No fever or chills reported.        History provided by:  Patient  Chest Pain   Pain location:  L chest  Pain quality: dull    Pain radiates to:  L arm  Pain severity:  Moderate  Onset quality:  Sudden  Timing:  Constant  Progression:  Partially resolved  Chronicity:  New  Relieved by:  Nothing  Worsened by:  Nothing  Ineffective treatments:  None tried  Associated symptoms: anxiety    Associated symptoms: no abdominal pain, no back pain, no claudication, no diaphoresis, no dysphagia, no headache, no nausea, no near-syncope, no numbness, no palpitations, no shortness of breath, no vomiting and no weakness    Risk factors: no coronary artery disease, no diabetes mellitus, no hypertension, not male and no smoking        Review of Systems   Constitutional: Negative for diaphoresis.   HENT: Negative for congestion, nosebleeds, rhinorrhea, sinus pain and trouble swallowing.    Respiratory: Negative for chest tightness and shortness of breath.    Cardiovascular: Positive for chest pain. Negative for palpitations, claudication and near-syncope.   Gastrointestinal: Negative for abdominal pain, nausea and vomiting.   Genitourinary: Negative for flank pain.   Musculoskeletal: Negative for back pain.   Skin: Negative for color change.   Neurological: Negative for weakness, numbness and headaches.   Psychiatric/Behavioral: Negative for agitation.       Past Medical History:   Diagnosis Date   • Anemia    • Anxiety    • Arthritis    • Bipolar depression (CMS/HCC)    • Depression        No Known Allergies    Past Surgical History:   Procedure Laterality Date   • COLONOSCOPY N/A 2/8/2018    Procedure: COLONOSCOPY;  Surgeon: Geoff Frank MD;  Location: [facility] ENDOSCOPY;   Service:    • ENDOSCOPY N/A 2/8/2018    Procedure: ESOPHAGOGASTRODUODENOSCOPY;  Surgeon: Geoff Frank MD;  Location: [facility] ENDOSCOPY;  Service:    • EYE SURGERY Right    • TUBAL ABDOMINAL LIGATION         Family History   Problem Relation Age of Onset   • Arthritis Mother    • Hypertension Mother    • Ovarian cancer Mother    • Breast cancer Maternal Aunt    • Colon cancer Maternal Uncle    • Lung cancer Maternal Uncle    • Heart disease Maternal Grandfather    • Stroke Maternal Grandfather    • Lung cancer Maternal Aunt        Social History     Socioeconomic History   • Marital status: Single     Spouse name: Not on file   • Number of children: Not on file   • Years of education: Not on file   • Highest education level: Not on file   Tobacco Use   • Smoking status: Never Smoker   • Smokeless tobacco: Never Used   Substance and Sexual Activity   • Alcohol use: No   • Drug use: No   • Sexual activity: Defer           Objective   Physical Exam   Constitutional: She is oriented to person, place, and time. She appears well-developed and well-nourished.   HENT:   Head: Atraumatic.   Eyes: EOM are normal.   Neck: Normal range of motion. Neck supple.   Cardiovascular: Normal rate and regular rhythm.       Pulmonary/Chest: Effort normal and breath sounds normal. She has no wheezes. She has no rhonchi. She has no rales.   Abdominal: Soft.   Musculoskeletal: Normal range of motion.        Right lower leg: Normal.        Left lower leg: Normal.   Neurological: She is alert and oriented to person, place, and time.   Skin: Skin is warm and dry.   Psychiatric: She has a normal mood and affect.   Nursing note and vitals reviewed.      ECG 12 Lead    Date/Time: 10/25/2019 12:50 AM  Performed by: Avelino Watkins MD  Authorized by: Avelino Watkins MD   Interpreted by physician  Rhythm: sinus rhythm  Rate: normal  BPM: 95  QRS axis: normal  Conduction: conduction normal  ST Segments: ST segments normal  T Waves: T waves  normal  Other: no other findings  Clinical impression: normal ECG                 ED Course                  MDM  Number of Diagnoses or Management Options  Diagnosis management comments: Ruled out acute coronary syndrome, pneumonia, pneumothorax, pulmonary embolism, dissection.  Her pain has some reproducibility with tenderness on palpation of the left upper chest.  This could be musculoskeletal.  Patient is feeling better after Toradol shot.  She is stable for discharge with outpatient follow-up.       Amount and/or Complexity of Data Reviewed  Clinical lab tests: ordered and reviewed  Tests in the radiology section of CPT®: ordered and reviewed      Labs Reviewed   COMPREHENSIVE METABOLIC PANEL - Abnormal; Notable for the following components:       Result Value    Glucose 139 (*)     Creatinine 1.01 (*)     Calcium 8.5 (*)     Total Bilirubin <0.2 (*)     All other components within normal limits    Narrative:     GFR Normal >60  Chronic Kidney Disease <60  Kidney Failure <15   CBC WITH AUTO DIFFERENTIAL - Abnormal; Notable for the following components:    Hemoglobin 9.8 (*)     Hematocrit 32.3 (*)     MCV 73.7 (*)     MCH 22.4 (*)     MCHC 30.3 (*)     RDW 17.5 (*)     Lymphocytes, Absolute 3.15 (*)     All other components within normal limits   D-DIMER, QUANTITATIVE - Abnormal; Notable for the following components:    D-Dimer, Quantitative 567 (*)     All other components within normal limits    Narrative:     Dimer values <500 ng/ml FEU are FDA approved as aid in diagnosis of deep venous thrombosis and pulmonary embolism.  This test should not be used in an exclusion strategy with pretest probability alone.    A recent guideline regarding diagnosis for pulmonary thromboembolism recommends an adjusted exclusion criterion of age x 10 ng/ml FEU for patients >50 years of age ([employer] Intern Med 2015; 163: 701-711).   TROPONIN (IN-HOUSE) - Normal    Narrative:     Troponin T Reference Range:  <= 0.03 ng/mL-    Negative for AMI  >0.03 ng/mL-     Abnormal for myocardial necrosis.  Clinicians would have to utilize clinical acumen, EKG, Troponin and serial changes to determine if it is an Acute Myocardial Infarction or myocardial injury due to an underlying chronic condition.    BNP (IN-HOUSE) - Normal    Narrative:     Among patients with dyspnea, NT-proBNP is highly sensitive for the detection of acute congestive heart failure. In addition NT-proBNP of <300 pg/ml effectively rules out acute congestive heart failure with 99% negative predictive value.   HCG, SERUM, QUALITATIVE - Normal   RAINBOW DRAW    Narrative:     The following orders were created for panel order [place] Draw.  Procedure                               Abnormality         Status                     ---------                               -----------         ------                     Light Blue Top[[id number]]                                   Final result               Green Top (Gel)[[id number]]                                  Final result               Lavender Top[[id number]]                                     Final result               Gold Top - SST[[id number]]                                                                Please view results for these tests on the individual orders.   SCAN SLIDE   TROPONIN (IN-HOUSE)   CBC AND DIFFERENTIAL    Narrative:     The following orders were created for panel order CBC & Differential.  Procedure                               Abnormality         Status                     ---------                               -----------         ------                     CBC Auto Differential[[id number]]        Abnormal            Final result                 Please view results for these tests on the individual orders.   LIGHT BLUE TOP   GREEN TOP   LAVENDER TOP       Xr Chest 2 View    Result Date: 10/24/2019  Narrative: Chest 2 view on  10/24/2019 CLINICAL INDICATION: Chest pain COMPARISON: None FINDINGS: The lungs are  clear. Cardiac, hilar and mediastinal contours are within normal limits. Pulmonary vascularity is within normal limits. No bony abnormality is noted.     Impression: No active disease. Electronically signed by:  Shaun Lee  10/24/2019 11:29 PM CDT Workstation: [id number]    Ct Angiogram Chest    Result Date: 10/25/2019  Narrative: CT angiogram chest with contrast on 10/25/2019 CLINICAL INDICATION: Chest pain TECHNIQUE: Multiple axial images are obtained throughout the chest following the administration of IV contrast.  Computer generated 3D reconstructions/MIPS were performed. This exam was performed according to our departmental dose-optimization program, which includes automated exposure control, adjustment of the mA and/or kV according to patient size and/or use of iterative reconstruction technique. Total DLP is 232.9 mGy*cm. COMPARISON: CT abdomen and lower chest from 5/17/2015 FINDINGS: There is no thoracic aortic aneurysm or dissection. There is no pleural or pericardial effusion. There is no thoracic adenopathy. There are no filling defects within the pulmonary arteries to suggest pulmonary embolus. There is minimal bilateral dependent atelectasis. There is stable benign juxtapleural nodule in the left lung base on image 90, no follow-up of this is recommended per Fleischner Society recommendations 2017. The lungs are otherwise clear. Limited visualized upper abdomen is unremarkable. No bony abnormality is noted.     Impression: 1. No evidence of pulmonary embolus. 2. No acute abnormality. Electronically signed by:  Shaun Lee  10/25/2019 12:40 AM CDT Workstation: [id number]          Final diagnoses:   Atypical chest pain              Avelino Watkins MD  10/25/19 0053       Avelino Watkins MD  10/25/19 0054

## 2019-10-25 NOTE — ED NOTES
Pt states she was eating vegetable soup when the pain started. States pain is worse with deep breath.     Afia Segundo RN  10/24/19 2011

## 2019-10-25 NOTE — DISCHARGE INSTRUCTIONS
Take Tylenol/ibuprofen as needed.  Follow-up outpatient with primary care and cardiology for reevaluation.  Return to ER for any worsening.

## 2019-11-12 ENCOUNTER — OFFICE VISIT (OUTPATIENT)
Dept: FAMILY MEDICINE CLINIC | Facility: CLINIC | Age: 39
End: 2019-11-12

## 2019-11-12 ENCOUNTER — APPOINTMENT (OUTPATIENT)
Dept: LAB | Facility: HOSPITAL | Age: 39
End: 2019-11-12

## 2019-11-12 VITALS
HEIGHT: 60 IN | TEMPERATURE: 97.5 F | SYSTOLIC BLOOD PRESSURE: 142 MMHG | BODY MASS INDEX: 39.5 KG/M2 | RESPIRATION RATE: 20 BRPM | WEIGHT: 201.2 LBS | HEART RATE: 82 BPM | OXYGEN SATURATION: 98 % | DIASTOLIC BLOOD PRESSURE: 96 MMHG

## 2019-11-12 DIAGNOSIS — R07.89 OTHER CHEST PAIN: Primary | ICD-10-CM

## 2019-11-12 DIAGNOSIS — R73.09 ELEVATED GLUCOSE: ICD-10-CM

## 2019-11-12 DIAGNOSIS — M54.2 NECK PAIN: ICD-10-CM

## 2019-11-12 DIAGNOSIS — Z51.81 THERAPEUTIC DRUG MONITORING: ICD-10-CM

## 2019-11-12 DIAGNOSIS — D50.9 MICROCYTIC ANEMIA: ICD-10-CM

## 2019-11-12 DIAGNOSIS — K92.2 LOWER GI BLEED: ICD-10-CM

## 2019-11-12 PROCEDURE — 80156 ASSAY CARBAMAZEPINE TOTAL: CPT | Performed by: STUDENT IN AN ORGANIZED HEALTH CARE EDUCATION/TRAINING PROGRAM

## 2019-11-12 PROCEDURE — 84466 ASSAY OF TRANSFERRIN: CPT | Performed by: STUDENT IN AN ORGANIZED HEALTH CARE EDUCATION/TRAINING PROGRAM

## 2019-11-12 PROCEDURE — 80048 BASIC METABOLIC PNL TOTAL CA: CPT | Performed by: STUDENT IN AN ORGANIZED HEALTH CARE EDUCATION/TRAINING PROGRAM

## 2019-11-12 PROCEDURE — 99213 OFFICE O/P EST LOW 20 MIN: CPT | Performed by: STUDENT IN AN ORGANIZED HEALTH CARE EDUCATION/TRAINING PROGRAM

## 2019-11-12 PROCEDURE — 83036 HEMOGLOBIN GLYCOSYLATED A1C: CPT | Performed by: STUDENT IN AN ORGANIZED HEALTH CARE EDUCATION/TRAINING PROGRAM

## 2019-11-12 PROCEDURE — 82728 ASSAY OF FERRITIN: CPT | Performed by: STUDENT IN AN ORGANIZED HEALTH CARE EDUCATION/TRAINING PROGRAM

## 2019-11-12 PROCEDURE — 83615 LACTATE (LD) (LDH) ENZYME: CPT | Performed by: STUDENT IN AN ORGANIZED HEALTH CARE EDUCATION/TRAINING PROGRAM

## 2019-11-12 PROCEDURE — 85027 COMPLETE CBC AUTOMATED: CPT | Performed by: STUDENT IN AN ORGANIZED HEALTH CARE EDUCATION/TRAINING PROGRAM

## 2019-11-12 PROCEDURE — 36415 COLL VENOUS BLD VENIPUNCTURE: CPT | Performed by: STUDENT IN AN ORGANIZED HEALTH CARE EDUCATION/TRAINING PROGRAM

## 2019-11-12 PROCEDURE — 83540 ASSAY OF IRON: CPT | Performed by: STUDENT IN AN ORGANIZED HEALTH CARE EDUCATION/TRAINING PROGRAM

## 2019-11-12 PROCEDURE — 85045 AUTOMATED RETICULOCYTE COUNT: CPT | Performed by: STUDENT IN AN ORGANIZED HEALTH CARE EDUCATION/TRAINING PROGRAM

## 2019-11-12 RX ORDER — PROPRANOLOL HYDROCHLORIDE 20 MG/1
20 TABLET ORAL 3 TIMES DAILY PRN
Refills: 0 | COMMUNITY
Start: 2019-08-28 | End: 2020-07-01

## 2019-11-12 RX ORDER — FLUOXETINE 20 MG/1
20 TABLET, FILM COATED ORAL DAILY
COMMUNITY
Start: 2018-07-19 | End: 2020-05-22 | Stop reason: SDUPTHER

## 2019-11-12 RX ORDER — ASENAPINE MALEATE 2.5 MG/1
TABLET SUBLINGUAL
Refills: 1 | COMMUNITY
Start: 2019-10-28 | End: 2019-12-09 | Stop reason: SDDI

## 2019-11-12 RX ORDER — LISINOPRIL 10 MG/1
10 TABLET ORAL DAILY
Qty: 30 TABLET | Refills: 2 | Status: SHIPPED | OUTPATIENT
Start: 2019-11-12 | End: 2020-08-05

## 2019-11-13 DIAGNOSIS — D50.9 MICROCYTIC ANEMIA: Primary | ICD-10-CM

## 2019-11-13 LAB
ANION GAP SERPL CALCULATED.3IONS-SCNC: 18.7 MMOL/L (ref 5–15)
BUN BLD-MCNC: 13 MG/DL (ref 6–20)
BUN/CREAT SERPL: 18.3 (ref 7–25)
CALCIUM SPEC-SCNC: 8.6 MG/DL (ref 8.6–10.5)
CARBAMAZEPINE SERPL-MCNC: 5.8 MCG/ML (ref 4–12)
CHLORIDE SERPL-SCNC: 100 MMOL/L (ref 98–107)
CO2 SERPL-SCNC: 22.3 MMOL/L (ref 22–29)
CREAT BLD-MCNC: 0.71 MG/DL (ref 0.57–1)
DEPRECATED RDW RBC AUTO: 44.4 FL (ref 37–54)
ERYTHROCYTE [DISTWIDTH] IN BLOOD BY AUTOMATED COUNT: 16.9 % (ref 12.3–15.4)
FERRITIN SERPL-MCNC: 4.59 NG/ML (ref 13–150)
GFR SERPL CREATININE-BSD FRML MDRD: 92 ML/MIN/1.73
GLUCOSE BLD-MCNC: 86 MG/DL (ref 65–99)
HBA1C MFR BLD: 6.04 % (ref 4.8–5.6)
HCT VFR BLD AUTO: 31.6 % (ref 34–46.6)
HGB BLD-MCNC: 9.8 G/DL (ref 12–15.9)
IRON 24H UR-MRATE: 25 MCG/DL (ref 37–145)
LDH SERPL-CCNC: 163 U/L (ref 135–214)
MCH RBC QN AUTO: 22.7 PG (ref 26.6–33)
MCHC RBC AUTO-ENTMCNC: 31 G/DL (ref 31.5–35.7)
MCV RBC AUTO: 73.1 FL (ref 79–97)
PLATELET # BLD AUTO: 409 10*3/MM3 (ref 140–450)
PMV BLD AUTO: 9.3 FL (ref 6–12)
POTASSIUM BLD-SCNC: 4.4 MMOL/L (ref 3.5–5.2)
RBC # BLD AUTO: 4.32 10*6/MM3 (ref 3.77–5.28)
RETICS # AUTO: 0.06 10*6/MM3 (ref 0.02–0.13)
RETICS/RBC NFR AUTO: 1.33 % (ref 0.7–1.9)
SODIUM BLD-SCNC: 141 MMOL/L (ref 136–145)
TRANSFERRIN SERPL-MCNC: 353 MG/DL (ref 200–360)
WBC NRBC COR # BLD: 11.11 10*3/MM3 (ref 3.4–10.8)

## 2019-11-14 RX ORDER — SENNA AND DOCUSATE SODIUM 50; 8.6 MG/1; MG/1
1 TABLET, FILM COATED ORAL DAILY
Qty: 30 TABLET | Refills: 4 | Status: SHIPPED | OUTPATIENT
Start: 2019-11-14 | End: 2020-07-01

## 2019-11-14 RX ORDER — FERROUS SULFATE 325(65) MG
325 TABLET ORAL
Qty: 30 TABLET | Refills: 4 | Status: SHIPPED | OUTPATIENT
Start: 2019-11-14 | End: 2021-08-06

## 2019-11-14 NOTE — PROGRESS NOTES
Called patient to review labs and plain films.  Will prescribe patient iron supplement and stool softener.  Informed patient to expect a phone call from scheduling for both general surgery and physical therapy.  Asked patient to call office if she does not hear from scheduling within a week.  All questions answered.      Devon Kramer M.D. PGY2  [facility]  [address] [place] [address]  Office: [phone]    This document has been electronically signed by Devon Kramer MD on November 14, 2019 8:00 AM

## 2019-11-14 NOTE — PROGRESS NOTES
I have seen the patient.  I have reviewed the notes, assessments, and/or procedures performed by Dr. Kapil Kramre, I concur with his  documentation and assessment and plan for Gosia Brown.          This document has been electronically signed by Booker Garcia MD on November 14, 2019 3:50 PM
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
## 2019-11-15 ENCOUNTER — TELEPHONE (OUTPATIENT)
Dept: FAMILY MEDICINE CLINIC | Facility: CLINIC | Age: 39
End: 2019-11-15

## 2019-11-15 NOTE — TELEPHONE ENCOUNTER
PT CALLED AND SAID SHE WAS TOLD TO START TAKING 1000MG BID AND WAS TAKEN OFF ALL HER PREVIOUS MEDICATIONS. SHE WAS TOLD THAT IF THE 1000MG DID NOT WORK TO CALL OUR OFFICE AND LET DR. GOVEA KNOW.     SHE SAID HER NECK IS HURTING SO BAD IT IS MAKING HER SICK.     PLEASE CALL [phone]    THANK YOU.

## 2019-11-19 ENCOUNTER — DOCUMENTATION (OUTPATIENT)
Dept: FAMILY MEDICINE CLINIC | Facility: CLINIC | Age: 39
End: 2019-11-19

## 2019-11-19 DIAGNOSIS — M54.2 CERVICAL PAIN (NECK): Primary | ICD-10-CM

## 2019-11-25 ENCOUNTER — HOSPITAL ENCOUNTER (OUTPATIENT)
Dept: PHYSICAL THERAPY | Facility: HOSPITAL | Age: 39
Setting detail: THERAPIES SERIES
Discharge: HOME | End: 2019-11-25

## 2019-11-25 DIAGNOSIS — M54.2 CERVICAL PAIN (NECK): Primary | ICD-10-CM

## 2019-11-25 PROCEDURE — 97161 PT EVAL LOW COMPLEX 20 MIN: CPT | Performed by: PHYSICAL THERAPIST

## 2019-11-25 NOTE — THERAPY EVALUATION
Outpatient Physical Therapy Ortho Initial Evaluation  [facility]     Patient Name: Gosia Brown  : 1980  MRN: [id number]  Today's Date: 2019      Visit Date: 2019  Visit   Return to MD: ESTUARDO  Re-cert date: 19  Patient Active Problem List   Diagnosis   • Blood in stool   • Nausea   • Bloating   • Generalized abdominal pain   • Change in bowel habits   • Right knee pain   • Internal derangement of right knee   • Abnormal uterine bleeding   • Family history of breast cancer   • Family history of ovarian cancer   • Menorrhagia   • PAT (paroxysmal atrial tachycardia) (CMS/HCC)   • Uterine fibroid   • Vasovagal syncope   • Cervical pain (neck)        Past Medical History:   Diagnosis Date   • Anemia    • Anxiety    • Arthritis    • Back problem    • Bipolar depression (CMS/HCC)    • Depression    • Heart problem    • Heartburn         Past Surgical History:   Procedure Laterality Date   • COLONOSCOPY N/A 2018    Procedure: COLONOSCOPY;  Surgeon: Geoff Frank MD;  Location: [facility] ENDOSCOPY;  Service:    • ENDOSCOPY N/A 2018    Procedure: ESOPHAGOGASTRODUODENOSCOPY;  Surgeon: Geoff Frank MD;  Location: [facility] ENDOSCOPY;  Service:    • EYE SURGERY Right    • TUBAL ABDOMINAL LIGATION         Visit Dx:     ICD-10-CM ICD-9-CM   1. Cervical pain (neck) M54.2 723.1     Medications (Admitted on 2019)     busPIRone (BUSPAR) 15 MG tablet     carBAMazepine XR (TEGretol XR) 400 MG 12 hr tablet     ferrous sulfate (IRON SUPPLEMENT) 325 (65 FE) MG tablet     FLUoxetine (PROzac) 20 MG tablet     lisinopril (PRINIVIL,ZESTRIL) 10 MG tablet     propranolol (INDERAL) 20 MG tablet     SAPHRIS 2.5 MG sublingual tablet     senna-docusate sodium (SENOKOT-S) 8.6-50 MG tablet     traZODone (DESYREL) 150 MG tablet        Allergies: NKA      PT Ortho     Row Name 19 1300       Subjective Comments    Subjective Comments  40 yo female with chronic neck pain off and on for the  past 13 years, on a daily basis for the past 45 days (1.5 months). Steadily worse over past 2 1/2 years. Has had migraine HA's since age 9, feels tenderness to palpation on occipital HA's. Looking downward (cervical flex) causes nausea and lightheadedness/sycope over the past 5 years. Easing factors: looking overhead, meds-Aleve; Aggravating factors-looking downward, right c-spine rotation, IADL's.   -BS       Precautions and Contraindications    Precautions/Limitations  no known precautions/limitations  -BS       Subjective Pain    Able to rate subjective pain?  yes  -BS    Pre-Treatment Pain Level  10  -BS    Post-Treatment Pain Level  8  -BS       Quarter Clearing    Quarter Clearing  Upper Quarter Clearing  -BS       Neural Tension Signs- Upper Quarter Clearing    ULNTT 2  Bilateral:;Postive  -BS       Sensory Screen for Light Touch- Upper Quarter Clearing    C4 (posterior shoulder)  Right:;Diminished;Left:;Intact  -BS    C5 (lateral upper arm)  Bilateral:;Intact  -BS    C6 (tip of thumb)  Bilateral:;Intact  -BS    C7 (tip of 3rd finger)  Bilateral:;Intact  -BS    C8 (tip of 5th finger)  Right:;Diminished;Left:;Intact  -BS    T1 (medial lower arm)  Bilateral:;Intact  -BS       Myotomal Screen- Upper Quarter Clearing    Shoulder flexion (C5)  Bilateral:;5 (Normal)  -BS    Elbow flexion/wrist extension (C6)  Bilateral:;5 (Normal)  -BS    Elbow extension/wrist flexion (C7)  Bilateral:;5 (Normal)  -BS    Finger flexion/ (C8)  Bilateral:;5 (Normal)  -BS    Finger abduction (T1)  Bilateral:;5 (Normal)  -BS      Bilateral:;WNL  -BS       Cervical/Shoulder ROM Screen    Cervical flexion  Normal  -BS    Cervical extension  Normal  -BS    Cervical lateral flexion  Impaired R WFL, L 75%  -BS    Cervical rotation  Impaired R 50%, L WFL  -BS       Special Tests/Palpation    Special Tests/Palpation  Cervical/Thoracic  -BS       Cervical/Thoracic Special Tests    Spurlings (Foraminal Compression)   Right:;Positive;Left:;Negative L shoulder pain elicited w/ R SB w/ axial compression  -BS      User Key  (r) = Recorded By, (t) = Taken By, (c) = Cosigned By    Initials Name Provider Type    Rajinder Moctezuma, PT Physical Therapist                      Therapy Education  Education Details: sustained cervical retraction, cervical flex +/- self OP  Given: HEP, Posture/body mechanics  Program: New  How Provided: Verbal, Demonstration  Provided to: Patient  Level of Understanding: Verbalized, Demonstrated     PT OP Goals     Row Name 11/25/19 1400          PT Short Term Goals    STG Date to Achieve  12/16/19  -BS     STG 1  Pt indep with HEP  -BS     STG 1 Progress  New  -BS     STG 2  Improve R cervical rotation AROM to WFL  -BS     STG 2 Progress  New  -BS     STG 3  Reduce HA pain by 50% during the day  -BS     STG 3 Progress  New  -BS     STG 4  Reduce neck pain by 75% performing daily tasks  -BS     STG 4 Progress  New  -BS     STG 5  Reduce NDI scrore to </= 20/50  -BS     STG 5 Progress  New  -BS        Time Calculation    PT Goal Re-Cert Due Date  12/16/19  -BS       User Key  (r) = Recorded By, (t) = Taken By, (c) = Cosigned By    Initials Name Provider Type    Rajinder Moctezuma, PT Physical Therapist          PT Assessment/Plan     Row Name 11/25/19 1345          PT Assessment    Functional Limitations  Performance in work activities;Performance in leisure activities;Limitation in home management;Performance in self-care ADL  -BS     Impairments  Sensation;Posture;Pain;Impaired flexibility  -BS     Assessment Comments  Chronic neck and headheaches pain with evidence of UE radiculoapathy.  -BS     Please refer to paper survey for additional self-reported information  Yes  -BS     Rehab Potential  Good  -BS     Patient/caregiver participated in establishment of treatment plan and goals  Yes  -BS     Patient would benefit from skilled therapy intervention  Yes  -BS        PT Plan    PT Frequency  2x/week  -BS   "   Predicted Duration of Therapy Intervention (Therapy Eval)  3 weeks  -BS     Planned CPT's?  PT RE-EVAL: 80081;PT THER PROC EA 15 MIN: 29055;PT MANUAL THERAPY EA 15 MIN: 94679;PT ELECTRICAL STIM UNATTEND: ;PT TRACTION CERVICAL: 13977;PT THER SUPP EA 15 MIN;PT EVAL LOW COMPLEXITY: 51977  -BS     Physical Therapy Interventions (Optional Details)  home exercise program;joint mobilization;manual lymphatic drainage;modalities;neuromuscular re-education;patient/family education;ROM (Range of Motion);strengthening;stretching  -BS     PT Plan Comments  f/u with re-checking tolerance to sustained retractions and cervical flexion.   -BS       User Key  (r) = Recorded By, (t) = Taken By, (c) = Cosigned By    Initials Name Provider Type    Rajinder Moctezuma, PT Physical Therapist            OP Exercises     Row Name 11/25/19 1300             Subjective Comments    Subjective Comments  38 yo female with chronic neck pain off and on for the past 13 years, on a daily basis for the past 45 days (1.5 months). Steadily worse over past 2 1/2 years. Has had migraine HA's since age 9, feels tenderness to palpation on occipital HA's. Looking downward (cervical flex) causes nausea and lightheadedness/sycope over the past 5 years. Easing factors: looking overhead, meds-Aleve; Aggravating factors-looking downward, right c-spine rotation, IADL's.   -BS         Subjective Pain    Able to rate subjective pain?  yes  -BS      Pre-Treatment Pain Level  10  -BS      Post-Treatment Pain Level  8  -BS         Exercise 1    Exercise Name 1  sustained cervical retraction in supine  -BS      Reps 1  30\" hold  -BS      Additional Comments  Reduced HA  -BS         Exercise 2    Exercise Name 2  supine cervical flex +/- self OP  -BS      Sets 2  1  -BS      Reps 2  10 ea  -BS      Additional Comments  reduced neck pain  -BS        User Key  (r) = Recorded By, (t) = Taken By, (c) = Cosigned By    Initials Name Provider Type    Rajinder Moctezuma, " PT Physical Therapist                        Outcome Measure Options: Neck Disability Index (NDI)  Neck Disability Index  Section 1 - Pain Intensity: The pain is very severe at the moment.  Section 2 - Personal Care: I can look after myself normally, but it causes extra pain.  Section 3 - Lifting: I can lift heavy weights, but it gives me extra pain.  Section 4 - Work: I can do most of my usual work, but no more  Section 5 - Headaches: I have headaches almost all the time.  Section 6 - Concentration: I have a lot of difficulty concentrating.  Section 7 - Sleeping: My sleep is slightly disturbed for less than 1 hour.  Section 8 - Driving: I can drive as long as I want with moderate neck pain.  Section 9 - Reading: I can't read as much as I want because of severe neck pain.  Section 10 - Recreation: I can hardly do recreational activities due to neck pain.  Neck Disability Index Score: 27      Time Calculation:     Start Time: 1345  Stop Time: 1424  Time Calculation (min): 39 min  Total Timed Code Minutes- PT: 39 minute(s)     Therapy Charges for Today     Code Description Service Date Service Provider Modifiers Qty    56164098824 HC PT EVAL LOW COMPLEXITY 3 11/25/2019 Rajinder Lorenz, PT GP 1          PT G-Codes  Outcome Measure Options: Neck Disability Index (NDI)  Neck Disability Index Score: 27         Rajinder Lorenz, PT  11/25/2019

## 2019-11-26 ENCOUNTER — CONSULT (OUTPATIENT)
Dept: SURGERY | Facility: CLINIC | Age: 39
End: 2019-11-26

## 2019-11-26 VITALS
TEMPERATURE: 97.8 F | SYSTOLIC BLOOD PRESSURE: 104 MMHG | WEIGHT: 197 LBS | BODY MASS INDEX: 38.68 KG/M2 | DIASTOLIC BLOOD PRESSURE: 68 MMHG | HEIGHT: 60 IN | HEART RATE: 85 BPM

## 2019-11-26 DIAGNOSIS — K62.5 RECTAL BLEEDING: Primary | ICD-10-CM

## 2019-11-26 DIAGNOSIS — E66.01 MORBIDLY OBESE (HCC): ICD-10-CM

## 2019-11-26 PROCEDURE — 99203 OFFICE O/P NEW LOW 30 MIN: CPT | Performed by: SURGERY

## 2019-11-26 NOTE — PROGRESS NOTES
Chief Complaint   Patient presents with   • Rectal Bleeding        HPI  39-year-old with a history of rectal bleeding.  Previous history of polyps.  She is been seen in the past by Dr. Frank approximately a year and half ago.  She does have a family history of colon cancer.  No fever, chills, night sweats, or weight loss.  Past Medical History:   Diagnosis Date   • Anemia    • Anxiety    • Arthritis    • Back problem    • Bipolar depression (CMS/HCC)    • Depression    • Heart problem    • Heartburn        Past Surgical History:   Procedure Laterality Date   • COLONOSCOPY N/A 2/8/2018    Procedure: COLONOSCOPY;  Surgeon: Geoff Frank MD;  Location: [facility] ENDOSCOPY;  Service:    • ENDOSCOPY N/A 2/8/2018    Procedure: ESOPHAGOGASTRODUODENOSCOPY;  Surgeon: Geoff Frank MD;  Location: [facility] ENDOSCOPY;  Service:    • EYE SURGERY Right    • TUBAL ABDOMINAL LIGATION           Current Outpatient Medications:   •  busPIRone (BUSPAR) 15 MG tablet, Take 15 mg by mouth 4 (Four) Times a Day., Disp: , Rfl:   •  carBAMazepine XR (TEGretol  XR) 400 MG 12 hr tablet, Take 400 mg by mouth Daily., Disp: , Rfl:   •  ferrous sulfate (IRON SUPPLEMENT) 325 (65 FE) MG tablet, Take 1 tablet by mouth Daily With Breakfast., Disp: 30 tablet, Rfl: 4  •  FLUoxetine (PROzac) 20 MG tablet, , Disp: , Rfl:   •  lisinopril (PRINIVIL,ZESTRIL) 10 MG tablet, Take 1 tablet by mouth Daily., Disp: 30 tablet, Rfl: 2  •  SAPHRIS 2.5 MG sublingual tablet, PLACE 1 UNDER THE TONGUE TWICE DAILY, Disp: , Rfl: 1  •  senna-docusate sodium (SENOKOT-S) 8.6-50 MG tablet, Take 1 tablet by mouth Daily., Disp: 30 tablet, Rfl: 4  •  traZODone (DESYREL) 150 MG tablet, Take 300 mg by mouth Every Night., Disp: , Rfl:   •  propranolol (INDERAL) 20 MG tablet, Take 20 mg by mouth 3 (Three) Times a Day As Needed., Disp: , Rfl: 0    No Known Allergies    Family History   Problem Relation Age of Onset   • Arthritis Mother    • Hypertension Mother    • Ovarian cancer  Mother    • Breast cancer Maternal Aunt    • Colon cancer Maternal Uncle    • Lung cancer Maternal Uncle    • Heart disease Maternal Grandfather    • Stroke Maternal Grandfather    • Cancer Maternal Grandfather    • Lung cancer Maternal Grandfather    • Lung cancer Maternal Aunt    • Cancer Maternal Grandmother        Social History     Socioeconomic History   • Marital status: Single     Spouse name: Not on file   • Number of children: Not on file   • Years of education: Not on file   • Highest education level: Not on file   Tobacco Use   • Smoking status: Never Smoker   • Smokeless tobacco: Never Used   Substance and Sexual Activity   • Alcohol use: No   • Drug use: No   • Sexual activity: Defer       Review of Systems   Constitutional: Negative.    Eyes: Negative.    Respiratory: Negative.    Cardiovascular: Positive for palpitations.   Gastrointestinal: Positive for abdominal pain and nausea.   Endocrine:        Thirst   Genitourinary: Positive for frequency.   Musculoskeletal: Positive for arthralgias and back pain.   Allergic/Immunologic: Negative.    Neurological: Positive for dizziness, numbness and headaches.   Hematological: Negative.    Psychiatric/Behavioral:        Mood changes       Physical Exam   Constitutional: She appears well-developed and well-nourished.   HENT:   Head: Normocephalic and atraumatic.   Eyes: EOM are normal. Pupils are equal, round, and reactive to light.   Neck: Normal range of motion. Neck supple.   Cardiovascular: Normal rate and regular rhythm.   Pulmonary/Chest: Effort normal and breath sounds normal.   Abdominal: Soft. Bowel sounds are normal.   Vitals reviewed.        ASSESSMENT    Gosia was seen today for rectal bleeding.    Diagnoses and all orders for this visit:    Rectal bleeding  -     Ambulatory Referral to Gastroenterology    Morbidly obese (CMS/[facility])        PLAN    1.  Recheck after colonoscopy            This document has been electronically signed by Bipin FABIAN  MD Jacob on November 27, 2019 6:45 PM

## 2019-11-26 NOTE — PATIENT INSTRUCTIONS

## 2019-11-27 PROBLEM — E66.01 MORBIDLY OBESE (HCC): Status: ACTIVE | Noted: 2019-11-27

## 2019-12-02 ENCOUNTER — APPOINTMENT (OUTPATIENT)
Dept: PHYSICAL THERAPY | Facility: HOSPITAL | Age: 39
End: 2019-12-02

## 2019-12-03 ENCOUNTER — HOSPITAL ENCOUNTER (EMERGENCY)
Facility: HOSPITAL | Age: 39
Discharge: HOME | End: 2019-12-03
Attending: EMERGENCY MEDICINE | Admitting: EMERGENCY MEDICINE

## 2019-12-03 ENCOUNTER — APPOINTMENT (OUTPATIENT)
Dept: CT IMAGING | Facility: HOSPITAL | Age: 39
End: 2019-12-03

## 2019-12-03 VITALS
BODY MASS INDEX: 38.05 KG/M2 | HEART RATE: 58 BPM | RESPIRATION RATE: 18 BRPM | TEMPERATURE: 98.5 F | SYSTOLIC BLOOD PRESSURE: 109 MMHG | WEIGHT: 193.8 LBS | DIASTOLIC BLOOD PRESSURE: 68 MMHG | HEIGHT: 60 IN | OXYGEN SATURATION: 98 %

## 2019-12-03 DIAGNOSIS — R11.2 NAUSEA AND VOMITING, INTRACTABILITY OF VOMITING NOT SPECIFIED, UNSPECIFIED VOMITING TYPE: ICD-10-CM

## 2019-12-03 DIAGNOSIS — R10.31 RIGHT LOWER QUADRANT ABDOMINAL PAIN: Primary | ICD-10-CM

## 2019-12-03 LAB
ALBUMIN SERPL-MCNC: 3.9 G/DL (ref 3.5–5.2)
ALBUMIN/GLOB SERPL: 1.3 G/DL
ALP SERPL-CCNC: 56 U/L (ref 39–117)
ALT SERPL W P-5'-P-CCNC: 7 U/L (ref 1–33)
ANION GAP SERPL CALCULATED.3IONS-SCNC: 11 MMOL/L (ref 5–15)
AST SERPL-CCNC: 12 U/L (ref 1–32)
B-HCG UR QL: NEGATIVE
BACTERIA UR QL AUTO: ABNORMAL /HPF
BASOPHILS # BLD AUTO: 0.03 10*3/MM3 (ref 0–0.2)
BASOPHILS NFR BLD AUTO: 0.6 % (ref 0–1.5)
BILIRUB SERPL-MCNC: <0.2 MG/DL (ref 0.2–1.2)
BILIRUB UR QL STRIP: NEGATIVE
BUN BLD-MCNC: 11 MG/DL (ref 6–20)
BUN/CREAT SERPL: 15.5 (ref 7–25)
CALCIUM SPEC-SCNC: 8.5 MG/DL (ref 8.6–10.5)
CARBAMAZEPINE SERPL-MCNC: 5.3 MCG/ML (ref 4–12)
CHLORIDE SERPL-SCNC: 101 MMOL/L (ref 98–107)
CLARITY UR: CLEAR
CO2 SERPL-SCNC: 25 MMOL/L (ref 22–29)
COLOR UR: YELLOW
CREAT BLD-MCNC: 0.71 MG/DL (ref 0.57–1)
DEPRECATED RDW RBC AUTO: 43.9 FL (ref 37–54)
EOSINOPHIL # BLD AUTO: 0.09 10*3/MM3 (ref 0–0.4)
EOSINOPHIL NFR BLD AUTO: 1.8 % (ref 0.3–6.2)
ERYTHROCYTE [DISTWIDTH] IN BLOOD BY AUTOMATED COUNT: 16.5 % (ref 12.3–15.4)
GFR SERPL CREATININE-BSD FRML MDRD: 91 ML/MIN/1.73
GLOBULIN UR ELPH-MCNC: 2.9 GM/DL
GLUCOSE BLD-MCNC: 86 MG/DL (ref 65–99)
GLUCOSE UR STRIP-MCNC: NEGATIVE MG/DL
HCT VFR BLD AUTO: 29.6 % (ref 34–46.6)
HGB BLD-MCNC: 9.3 G/DL (ref 12–15.9)
HGB UR QL STRIP.AUTO: NEGATIVE
HOLD SPECIMEN: NORMAL
HOLD SPECIMEN: NORMAL
HYALINE CASTS UR QL AUTO: ABNORMAL /LPF
IMM GRANULOCYTES # BLD AUTO: 0.02 10*3/MM3 (ref 0–0.05)
IMM GRANULOCYTES NFR BLD AUTO: 0.4 % (ref 0–0.5)
KETONES UR QL STRIP: ABNORMAL
LEUKOCYTE ESTERASE UR QL STRIP.AUTO: ABNORMAL
LIPASE SERPL-CCNC: 23 U/L (ref 13–60)
LYMPHOCYTES # BLD AUTO: 1.53 10*3/MM3 (ref 0.7–3.1)
LYMPHOCYTES NFR BLD AUTO: 31 % (ref 19.6–45.3)
MCH RBC QN AUTO: 23.1 PG (ref 26.6–33)
MCHC RBC AUTO-ENTMCNC: 31.4 G/DL (ref 31.5–35.7)
MCV RBC AUTO: 73.6 FL (ref 79–97)
MONOCYTES # BLD AUTO: 0.52 10*3/MM3 (ref 0.1–0.9)
MONOCYTES NFR BLD AUTO: 10.5 % (ref 5–12)
MUCOUS THREADS URNS QL MICRO: ABNORMAL /HPF
NEUTROPHILS # BLD AUTO: 2.75 10*3/MM3 (ref 1.7–7)
NEUTROPHILS NFR BLD AUTO: 55.7 % (ref 42.7–76)
NITRITE UR QL STRIP: NEGATIVE
NRBC BLD AUTO-RTO: 0 /100 WBC (ref 0–0.2)
PH UR STRIP.AUTO: 6 [PH] (ref 5–9)
PLATELET # BLD AUTO: 377 10*3/MM3 (ref 140–450)
PMV BLD AUTO: 8.8 FL (ref 6–12)
POTASSIUM BLD-SCNC: 3.5 MMOL/L (ref 3.5–5.2)
PROT SERPL-MCNC: 6.8 G/DL (ref 6–8.5)
PROT UR QL STRIP: NEGATIVE
RBC # BLD AUTO: 4.02 10*6/MM3 (ref 3.77–5.28)
RBC # UR: ABNORMAL /HPF
REF LAB TEST METHOD: ABNORMAL
SODIUM BLD-SCNC: 137 MMOL/L (ref 136–145)
SP GR UR STRIP: 1.02 (ref 1–1.03)
SQUAMOUS #/AREA URNS HPF: ABNORMAL /HPF
UROBILINOGEN UR QL STRIP: ABNORMAL
WBC NRBC COR # BLD: 4.94 10*3/MM3 (ref 3.4–10.8)
WBC UR QL AUTO: ABNORMAL /HPF
WHOLE BLOOD HOLD SPECIMEN: NORMAL
WHOLE BLOOD HOLD SPECIMEN: NORMAL
YEAST URNS QL MICRO: ABNORMAL /HPF

## 2019-12-03 PROCEDURE — 25010000002 KETOROLAC TROMETHAMINE PER 15 MG: Performed by: EMERGENCY MEDICINE

## 2019-12-03 PROCEDURE — 25010000002 IOPAMIDOL 61 % SOLUTION: Performed by: EMERGENCY MEDICINE

## 2019-12-03 PROCEDURE — 80156 ASSAY CARBAMAZEPINE TOTAL: CPT | Performed by: EMERGENCY MEDICINE

## 2019-12-03 PROCEDURE — 99283 EMERGENCY DEPT VISIT LOW MDM: CPT

## 2019-12-03 PROCEDURE — 74177 CT ABD & PELVIS W/CONTRAST: CPT

## 2019-12-03 PROCEDURE — 25010000002 MORPHINE PER 10 MG: Performed by: EMERGENCY MEDICINE

## 2019-12-03 PROCEDURE — 81025 URINE PREGNANCY TEST: CPT | Performed by: EMERGENCY MEDICINE

## 2019-12-03 PROCEDURE — 81001 URINALYSIS AUTO W/SCOPE: CPT | Performed by: EMERGENCY MEDICINE

## 2019-12-03 PROCEDURE — 25010000002 ONDANSETRON PER 1 MG: Performed by: EMERGENCY MEDICINE

## 2019-12-03 PROCEDURE — 96375 TX/PRO/DX INJ NEW DRUG ADDON: CPT

## 2019-12-03 PROCEDURE — 80053 COMPREHEN METABOLIC PANEL: CPT | Performed by: EMERGENCY MEDICINE

## 2019-12-03 PROCEDURE — 96374 THER/PROPH/DIAG INJ IV PUSH: CPT

## 2019-12-03 PROCEDURE — 83690 ASSAY OF LIPASE: CPT | Performed by: EMERGENCY MEDICINE

## 2019-12-03 PROCEDURE — 0 DIATRIZOATE MEGLUMINE & SODIUM PER 1 ML: Performed by: EMERGENCY MEDICINE

## 2019-12-03 PROCEDURE — 85025 COMPLETE CBC W/AUTO DIFF WBC: CPT | Performed by: EMERGENCY MEDICINE

## 2019-12-03 RX ORDER — KETOROLAC TROMETHAMINE 30 MG/ML
30 INJECTION, SOLUTION INTRAMUSCULAR; INTRAVENOUS ONCE
Status: COMPLETED | OUTPATIENT
Start: 2019-12-03 | End: 2019-12-03

## 2019-12-03 RX ORDER — DICYCLOMINE HCL 20 MG
20 TABLET ORAL EVERY 6 HOURS PRN
Qty: 24 TABLET | Refills: 0 | Status: SHIPPED | OUTPATIENT
Start: 2019-12-03 | End: 2020-01-27

## 2019-12-03 RX ORDER — ONDANSETRON 4 MG/1
4 TABLET, ORALLY DISINTEGRATING ORAL EVERY 6 HOURS PRN
Qty: 15 TABLET | Refills: 0 | Status: SHIPPED | OUTPATIENT
Start: 2019-12-03 | End: 2020-01-27

## 2019-12-03 RX ORDER — PROMETHAZINE HYDROCHLORIDE 12.5 MG/1
12.5 TABLET ORAL EVERY 6 HOURS PRN
Qty: 15 TABLET | Refills: 0 | Status: SHIPPED | OUTPATIENT
Start: 2019-12-03 | End: 2020-01-27

## 2019-12-03 RX ORDER — ONDANSETRON 2 MG/ML
4 INJECTION INTRAMUSCULAR; INTRAVENOUS ONCE
Status: COMPLETED | OUTPATIENT
Start: 2019-12-03 | End: 2019-12-03

## 2019-12-03 RX ORDER — SODIUM CHLORIDE 9 MG/ML
125 INJECTION, SOLUTION INTRAVENOUS CONTINUOUS
Status: DISCONTINUED | OUTPATIENT
Start: 2019-12-03 | End: 2019-12-03 | Stop reason: HOSPADM

## 2019-12-03 RX ORDER — SODIUM CHLORIDE 0.9 % (FLUSH) 0.9 %
10 SYRINGE (ML) INJECTION AS NEEDED
Status: DISCONTINUED | OUTPATIENT
Start: 2019-12-03 | End: 2019-12-03 | Stop reason: HOSPADM

## 2019-12-03 RX ADMIN — KETOROLAC TROMETHAMINE 30 MG: 30 INJECTION, SOLUTION INTRAMUSCULAR at 12:56

## 2019-12-03 RX ADMIN — MORPHINE SULFATE 4 MG: 4 INJECTION, SOLUTION INTRAMUSCULAR; INTRAVENOUS at 12:55

## 2019-12-03 RX ADMIN — SODIUM CHLORIDE 500 ML: 9 INJECTION, SOLUTION INTRAVENOUS at 12:55

## 2019-12-03 RX ADMIN — IOPAMIDOL 90 ML: 612 INJECTION, SOLUTION INTRAVENOUS at 14:24

## 2019-12-03 RX ADMIN — ONDANSETRON 4 MG: 2 INJECTION INTRAMUSCULAR; INTRAVENOUS at 12:55

## 2019-12-03 RX ADMIN — DIATRIZOATE MEGLUMINE AND DIATRIZOATE SODIUM 30 ML: 660; 100 LIQUID ORAL; RECTAL at 12:25

## 2019-12-03 NOTE — ED PROVIDER NOTES
Subjective   Patient presents emergency department 2 days of worsening right lower quadrant and lower abdominal pain.  Patient with no history of the same.  Patient has no significant medical problems Nourse surgical history save for tubal ligation.  Patient notes a nausea vomiting with this which is been severe over the past 24 hours to the point where the patient feels like she cannot take any p.o. intake without vomiting.  No prior history of same, no new sexual partners, no vaginal discharge, patient has not had diarrhea per her history.  No constipation.  No bladder changes.            Review of Systems   Constitutional: Negative.  Negative for appetite change, chills and fever.   HENT: Negative.  Negative for congestion.    Eyes: Negative.  Negative for photophobia and visual disturbance.   Respiratory: Negative.  Negative for cough, chest tightness and shortness of breath.    Cardiovascular: Negative.  Negative for chest pain and palpitations.   Gastrointestinal: Positive for abdominal pain, nausea and vomiting. Negative for constipation and diarrhea.   Endocrine: Negative.    Genitourinary: Negative.  Negative for decreased urine volume, dysuria, flank pain and hematuria.   Musculoskeletal: Negative.  Negative for arthralgias, back pain, myalgias, neck pain and neck stiffness.   Skin: Negative.  Negative for pallor.   Neurological: Negative.  Negative for dizziness, syncope, weakness, light-headedness, numbness and headaches.   Psychiatric/Behavioral: Negative.  Negative for confusion and suicidal ideas. The patient is not nervous/anxious.    All other systems reviewed and are negative.      Past Medical History:   Diagnosis Date   • Anemia    • Anxiety    • Arthritis    • Back problem    • Bipolar depression (CMS/HCC)    • Depression    • Heart problem    • Heartburn        No Known Allergies    Past Surgical History:   Procedure Laterality Date   • COLONOSCOPY N/A 2/8/2018    Procedure: COLONOSCOPY;   Surgeon: Geoff Frank MD;  Location: [facility] ENDOSCOPY;  Service:    • ENDOSCOPY N/A 2/8/2018    Procedure: ESOPHAGOGASTRODUODENOSCOPY;  Surgeon: Geoff Frank MD;  Location: [facility] ENDOSCOPY;  Service:    • EYE SURGERY Right    • TUBAL ABDOMINAL LIGATION         Family History   Problem Relation Age of Onset   • Arthritis Mother    • Hypertension Mother    • Ovarian cancer Mother    • Breast cancer Maternal Aunt    • Colon cancer Maternal Uncle    • Lung cancer Maternal Uncle    • Heart disease Maternal Grandfather    • Stroke Maternal Grandfather    • Cancer Maternal Grandfather    • Lung cancer Maternal Grandfather    • Lung cancer Maternal Aunt    • Cancer Maternal Grandmother        Social History     Socioeconomic History   • Marital status: Single     Spouse name: Not on file   • Number of children: Not on file   • Years of education: Not on file   • Highest education level: Not on file   Tobacco Use   • Smoking status: Never Smoker   • Smokeless tobacco: Never Used   Substance and Sexual Activity   • Alcohol use: No   • Drug use: No   • Sexual activity: Defer           Objective   Physical Exam   Constitutional: She is oriented to person, place, and time. She appears well-developed and well-nourished.  Non-toxic appearance. She does not appear ill. She appears distressed.   HENT:   Head: Normocephalic and atraumatic.   Nose: Nose normal.   Mouth/Throat: Oropharynx is clear and moist.   Eyes: Conjunctivae and EOM are normal. No scleral icterus.   Neck: Normal range of motion. Neck supple. No JVD present.   Cardiovascular: Normal rate, regular rhythm, normal heart sounds and intact distal pulses. Exam reveals no gallop and no friction rub.   No murmur heard.  Pulmonary/Chest: Effort normal. No respiratory distress. She has no wheezes. She has no rales. She exhibits no tenderness.   Abdominal: Soft. She exhibits no distension and no mass. There is tenderness in the right lower quadrant and suprapubic area.  There is guarding. There is no rebound.   Musculoskeletal: Normal range of motion. She exhibits no edema, tenderness or deformity.   Lymphadenopathy:     She has no cervical adenopathy.   Neurological: She is alert and oriented to person, place, and time. No cranial nerve deficit. She exhibits normal muscle tone.   Skin: Skin is warm and dry. Capillary refill takes less than 2 seconds. No rash noted. She is not diaphoretic. No erythema. No pallor.   Psychiatric: She has a normal mood and affect. Her behavior is normal. Judgment and thought content normal.   Nursing note and vitals reviewed.      Procedures           ED Course      Labs Reviewed   COMPREHENSIVE METABOLIC PANEL - Abnormal; Notable for the following components:       Result Value    Calcium 8.5 (*)     Total Bilirubin <0.2 (*)     All other components within normal limits    Narrative:     GFR Normal >60  Chronic Kidney Disease <60  Kidney Failure <15   URINALYSIS W/ MICROSCOPIC IF INDICATED (NO CULTURE) - Abnormal; Notable for the following components:    Ketones, UA Trace (*)     Leuk Esterase, UA Moderate (2+) (*)     All other components within normal limits   CBC WITH AUTO DIFFERENTIAL - Abnormal; Notable for the following components:    Hemoglobin 9.3 (*)     Hematocrit 29.6 (*)     MCV 73.6 (*)     MCH 23.1 (*)     MCHC 31.4 (*)     RDW 16.5 (*)     All other components within normal limits   URINALYSIS, MICROSCOPIC ONLY - Abnormal; Notable for the following components:    RBC, UA 0-2 (*)     WBC, UA 6-12 (*)     Bacteria, UA Trace (*)     Squamous Epithelial Cells, UA 6-12 (*)     Mucus, UA Moderate/2+ (*)     All other components within normal limits   LIPASE - Normal   PREGNANCY, URINE - Normal   CARBAMAZEPINE LEVEL, TOTAL - Normal   RAINBOW DRAW    Narrative:     The following orders were created for panel order Hydro Draw.  Procedure                               Abnormality         Status                     ---------                                -----------         ------                     Light Blue Top[[id number]]                                   Final result               Green Top (Gel)[[id number]]                                  Final result               Lavender Top[[id number]]                                     Final result               Gold Top - SST[[id number]]                                   Final result                 Please view results for these tests on the individual orders.   CBC AND DIFFERENTIAL    Narrative:     The following orders were created for panel order CBC & Differential.  Procedure                               Abnormality         Status                     ---------                               -----------         ------                     CBC Auto Differential[[id number]]        Abnormal            Final result                 Please view results for these tests on the individual orders.   LIGHT BLUE TOP   GREEN TOP   LAVENDER TOP   GOLD TOP - SST       CT Abdomen Pelvis With Contrast   Final Result   1. Negative CT abdomen pelvis study with contrast.      Electronically signed by:  Rajinder Briggs MD  12/3/2019 2:35 PM [facility]   Workstation: [id number]          No acute findings are noted on CT scan, blood work is unremarkable, chronic anemia.  Patient is scheduled for a colonoscopy.  Patient be discharged outpatient management.          MDM    Final diagnoses:   Right lower quadrant abdominal pain   Nausea and vomiting, intractability of vomiting not specified, unspecified vomiting type              Matt Laguerre MD  12/03/19 6401

## 2019-12-04 ENCOUNTER — HOSPITAL ENCOUNTER (OUTPATIENT)
Dept: PHYSICAL THERAPY | Facility: HOSPITAL | Age: 39
Setting detail: THERAPIES SERIES
Discharge: HOME | End: 2019-12-04

## 2019-12-04 DIAGNOSIS — M54.2 CERVICAL PAIN (NECK): Primary | ICD-10-CM

## 2019-12-04 PROCEDURE — 97110 THERAPEUTIC EXERCISES: CPT | Performed by: PHYSICAL THERAPIST

## 2019-12-04 PROCEDURE — 97140 MANUAL THERAPY 1/> REGIONS: CPT | Performed by: PHYSICAL THERAPIST

## 2019-12-04 NOTE — THERAPY TREATMENT NOTE
Outpatient Physical Therapy Ortho Treatment Note  [facility]     Patient Name: Gosia Brown  : 1980  MRN: [id number]  Today's Date: 2019      Visit Date: 2019  Visit 2/2  % improvement: not assessed  Return to MD: ESTUARDO  Re-cert date: 19  Visit Dx:    ICD-10-CM ICD-9-CM   1. Cervical pain (neck) M54.2 723.1       Patient Active Problem List   Diagnosis   • Blood in stool   • Nausea   • Bloating   • Generalized abdominal pain   • Change in bowel habits   • Right knee pain   • Internal derangement of right knee   • Abnormal uterine bleeding   • Family history of breast cancer   • Family history of ovarian cancer   • Menorrhagia   • PAT (paroxysmal atrial tachycardia) (CMS/HCC)   • Uterine fibroid   • Vasovagal syncope   • Cervical pain (neck)   • Morbidly obese (CMS/HCC)        Past Medical History:   Diagnosis Date   • Anemia    • Anxiety    • Arthritis    • Back problem    • Bipolar depression (CMS/HCC)    • Depression    • Heart problem    • Heartburn         Past Surgical History:   Procedure Laterality Date   • COLONOSCOPY N/A 2018    Procedure: COLONOSCOPY;  Surgeon: Geoff Frank MD;  Location: [facility] ENDOSCOPY;  Service:    • ENDOSCOPY N/A 2018    Procedure: ESOPHAGOGASTRODUODENOSCOPY;  Surgeon: Geoff Frank MD;  Location: [facility] ENDOSCOPY;  Service:    • EYE SURGERY Right    • TUBAL ABDOMINAL LIGATION         PT Ortho     Row Name 19 1600       Subjective Comments    Subjective Comments  Patient reports right sided neck pain and headaches exacerbated by any active r passive neck movement. She had a brief headache following her initial therapy evaluation.   -SW       Subjective Pain    Able to rate subjective pain?  yes  -SW    Pre-Treatment Pain Level  5  -SW    Post-Treatment Pain Level  2  -SW      User Key  (r) = Recorded By, (t) = Taken By, (c) = Cosigned By    Initials Name Provider Type    Yadira Perez Physical Therapist     "                  PT Assessment/Plan     Row Name 12/04/19 1600          PT Assessment    Assessment Comments  Patient reported relief of symptoms with manual therapy except cervical retraction in sitting with overpressure from therapist exacerbated symptoms. This was relieved with a suboccipital release.  -SW        PT Plan    PT Frequency  2x/week  -SW     Predicted Duration of Therapy Intervention (Therapy Eval)  3 weeks  -SW     PT Plan Comments  Focus on reducing suboccipital muscle tightness and improving deep neck flexor strength.   -SW       User Key  (r) = Recorded By, (t) = Taken By, (c) = Cosigned By    Initials Name Provider Type    Yadira Perez Physical Therapist            OP Exercises     Row Name 12/04/19 1600             Subjective Comments    Subjective Comments  Patient reports right sided neck pain and headaches exacerbated by any active r passive neck movement. She had a brief headache following her initial therapy evaluation.   -SW         Subjective Pain    Able to rate subjective pain?  yes  -SW      Pre-Treatment Pain Level  5  -SW      Post-Treatment Pain Level  2  -SW         Exercise 1    Exercise Name 1  Pro II L3  -SW      Reps 1  2 12'F/ 21/2'B  -SW         Exercise 2    Exercise Name 2  supine cervical retraction w OP from patient  -SW      Reps 2  10\"x10  -SW         Exercise 3    Exercise Name 3  sitting cervical retraction w op from therapist  -SW      Reps 3  10\"x10  -SW      Additional Comments  increased pain  -SW        User Key  (r) = Recorded By, (t) = Taken By, (c) = Cosigned By    Initials Name Provider Type    Yadira Perez Physical Therapist                      Manual Rx (last 36 hours)      Manual Treatments     Row Name 12/04/19 1500             Manual Rx 1    Manual Rx 1 Location  cervical  -SW      Manual Rx 1 Type  PROM/mobs/stretching/manual traction/suboccipital release  -SW      Manual Rx 1 Grade  3-4  -SW      Manual Rx 1 Duration  25'  -SW        User " Key  (r) = Recorded By, (t) = Taken By, (c) = Cosigned By    Initials Name Provider Type    Yadira Perez Physical Therapist          PT OP Goals     Row Name 12/04/19 1600          PT Short Term Goals    STG Date to Achieve  12/16/19  -     STG 1  Pt indep with HEP  -     STG 1 Progress  Progressing  -     STG 2  Improve R cervical rotation AROM to WFL  -     STG 2 Progress  Progressing  -     STG 3  Reduce HA pain by 50% during the day  -     STG 3 Progress  Progressing  -     STG 4  Reduce neck pain by 75% performing daily tasks  -     STG 4 Progress  Progressing  -     STG 5  Reduce NDI scrore to </= 20/50  -     STG 5 Progress  Progressing  -        Time Calculation    PT Goal Re-Cert Due Date  12/16/19  -       User Key  (r) = Recorded By, (t) = Taken By, (c) = Cosigned By    Initials Name Provider Type    Yadira Perez Physical Therapist                         Time Calculation:   Start Time: 1601  Stop Time: 1643  Time Calculation (min): 42 min  Therapy Charges for Today     Code Description Service Date Service Provider Modifiers Qty    88715167897 HC PT MANUAL THERAPY EA 15 MIN 12/4/2019 Yadira Mendoza GP 2    22264492562 HC PT THER PROC EA 15 MIN 12/4/2019 Yadira Mendoza GP 1                    Yadira Mendoza  12/4/2019

## 2019-12-06 ENCOUNTER — HOSPITAL ENCOUNTER (OUTPATIENT)
Dept: PHYSICAL THERAPY | Facility: HOSPITAL | Age: 39
Setting detail: THERAPIES SERIES
Discharge: HOME | End: 2019-12-06

## 2019-12-06 DIAGNOSIS — M54.2 CERVICAL PAIN (NECK): Primary | ICD-10-CM

## 2019-12-06 PROCEDURE — 97530 THERAPEUTIC ACTIVITIES: CPT

## 2019-12-06 PROCEDURE — 97140 MANUAL THERAPY 1/> REGIONS: CPT

## 2019-12-06 NOTE — THERAPY TREATMENT NOTE
Outpatient Physical Therapy Ortho Treatment Note  [facility]     Patient Name: Gosia Brown  : 1980  MRN: [id number]  Today's Date: 2019      Visit Date: 2019    Visit Dx:    ICD-10-CM ICD-9-CM   1. Cervical pain (neck) M54.2 723.1       Patient Active Problem List   Diagnosis   • Blood in stool   • Nausea   • Bloating   • Generalized abdominal pain   • Change in bowel habits   • Right knee pain   • Internal derangement of right knee   • Abnormal uterine bleeding   • Family history of breast cancer   • Family history of ovarian cancer   • Menorrhagia   • PAT (paroxysmal atrial tachycardia) (CMS/HCC)   • Uterine fibroid   • Vasovagal syncope   • Cervical pain (neck)   • Morbidly obese (CMS/HCC)        Past Medical History:   Diagnosis Date   • Anemia    • Anxiety    • Arthritis    • Back problem    • Bipolar depression (CMS/HCC)    • Depression    • Heart problem    • Heartburn         Past Surgical History:   Procedure Laterality Date   • COLONOSCOPY N/A 2018    Procedure: COLONOSCOPY;  Surgeon: Geoff Frank MD;  Location: [facility] ENDOSCOPY;  Service:    • ENDOSCOPY N/A 2018    Procedure: ESOPHAGOGASTRODUODENOSCOPY;  Surgeon: Geoff Frank MD;  Location: [facility] ENDOSCOPY;  Service:    • EYE SURGERY Right    • TUBAL ABDOMINAL LIGATION         PT Ortho     Row Name 19 1600       Subjective Comments    Subjective Comments  Patient reports right sided neck pain and headaches exacerbated by any active r passive neck movement. She had a brief headache following her initial therapy evaluation.   -SW       Subjective Pain    Able to rate subjective pain?  yes  -SW    Pre-Treatment Pain Level  5  -SW    Post-Treatment Pain Level  2  -SW      User Key  (r) = Recorded By, (t) = Taken By, (c) = Cosigned By    Initials Name Provider Type    Yadira Perez Physical Therapist                      PT Assessment/Plan     Row Name 19 1400          PT Assessment     Assessment Comments  Pt responding very well to activities & interventions.  Pt noting good improvement since last session and hopeful that she won't get a HA this weekend.  -     Rehab Potential  Good  -     Patient/caregiver participated in establishment of treatment plan and goals  Yes  -     Patient would benefit from skilled therapy intervention  Yes  -        PT Plan    PT Frequency  2x/week  -     Predicted Duration of Therapy Intervention (Therapy Eval)  3 wks  -     PT Plan Comments  Continue POC.  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  No HA reported today.  -        Subjective Pain    Able to rate subjective pain?  --  yes  -     Pre-Treatment Pain Level  --  4  -     Post-Treatment Pain Level  --  2  -        Total Minutes    67888 - PT Therapeutic Activity Minutes  25  -  --  -     94169 - PT Manual Therapy Minutes  10  -  --        Exercise 1    Exercise Name 1  --  Pro II L2 warm up  -     Cueing 1  --  Verbal  -     Time 1  --  12' (6'/6')  -     Additional Comments  --  L2  -        Exercise 2    Exercise Name 2  --  wall triplanar pelvic drivers  -     Cueing 2  --  Verbal;[facility]     Sets 2  --  1  -     Reps 2  --  10 ea  -     Additional Comments  --  HEP progression for postural proprioceptive enhancement/strength  -        Exercise 3    Exercise Name 3  --  Elephant/reverse elephant stretching  -     Cueing 3  --  Verbal;Demo  -     Sets 3  --  1  -     Reps 3  --  3  -   "   Time 3  --  5\" [facility]     Additional Comments  --  HEP training for Sx managemetn  -        Exercise 4    Exercise Name 4  --  Unilateral OH torso/shld girdle active tension stretching  -     Cueing 4  --  Verbal;Demo  -     Sets 4  --  1  -     Reps 4  --  2  -AH     Time 4  --  5\" ea  [facility]     Additional Comments  --  HEP progression for Sx mangement/postural tension reductions  -        Exercise 5    Exercise Name 5  --  Dynamic lateral swoop lunges  -     Cueing 5  --  Verbal;Demo  -     Sets 5  --  1  -     Reps 5  --  12  -     Additional Comments  --  HEP progression for light strength & postural re-training for mm endurance & Sx mangement  -       User Key  (r) = Recorded By, (t) = Taken By, (c) = Cosigned By    Initials Name Provider Type     Claude Neal, PT Physical Therapist                      Manual Rx (last 36 hours)      Manual Treatments     Row Name 12/06/19 1444 12/06/19 1300          Total Minutes    98896 - PT Manual Therapy Minutes  10  -  --        Manual Rx 1    Manual Rx 1 Location  --  cervical  -     Manual Rx 1 Type  --  PROM/mobs/stretching/manual traction/suboccipital release  -     Manual Rx 1 Grade  --  2-3  -AH     Manual Rx 1 Duration  --  10'  -       User Key  (r) = Recorded By, (t) = Taken By, (c) = Cosigned By    Initials Name Provider Type     Claude Neal, PT Physical Therapist          PT OP Goals     Row Name 12/06/19 1400          PT Short Term Goals    STG Date to Achieve  12/16/19  -     STG 1  Pt indep with HEP  -     STG 1 Progress  Progressing  -     STG 2  Improve R cervical rotation AROM to WFL  -     STG 2 Progress  Progressing  -     STG 3  Reduce HA pain by 50% during the day  -     STG 3 Progress  Progressing  -     STG 4  Reduce neck pain by 75% performing daily tasks  -     STG 4 Progress  Progressing  -     STG 5  Reduce NDI scrore to </= 20/50  -     STG 5 Progress  Progressing  -       User " Key  (r) = Recorded By, (t) = Taken By, (c) = Cosigned By    Initials Name Provider Type     Claude Neal, PT Physical Therapist          Therapy Education  Education Details: HEP, see flow sheet  Given: HEP, Symptoms/condition management, Pain management, Posture/body mechanics  Program: Progressed  How Provided: Verbal, Demonstration  Provided to: Patient  Level of Understanding: Teach back education performed, Verbalized, Demonstrated              Time Calculation:   Start Time: 1355  Stop Time: 1430  Time Calculation (min): 35 min  Total Timed Code Minutes- PT: 35 minute(s)  Therapy Charges for Today     Code Description Service Date Service Provider Modifiers Qty    25269134467  PT THERAPEUTIC ACT EA 15 MIN 12/6/2019 Claude Neal, PT GP 2    [id number]  PT MANUAL THERAPY EA 15 MIN 12/6/2019 Claude Neal, PT GP 1                    Claude Neal, PT  12/6/2019

## 2019-12-09 ENCOUNTER — LAB (OUTPATIENT)
Dept: LAB | Facility: HOSPITAL | Age: 39
End: 2019-12-09

## 2019-12-09 ENCOUNTER — HOSPITAL ENCOUNTER (OUTPATIENT)
Dept: PHYSICAL THERAPY | Facility: HOSPITAL | Age: 39
Setting detail: THERAPIES SERIES
Discharge: HOME | End: 2019-12-09

## 2019-12-09 ENCOUNTER — OFFICE VISIT (OUTPATIENT)
Dept: FAMILY MEDICINE CLINIC | Facility: CLINIC | Age: 39
End: 2019-12-09

## 2019-12-09 VITALS
HEART RATE: 116 BPM | SYSTOLIC BLOOD PRESSURE: 130 MMHG | BODY MASS INDEX: 37.99 KG/M2 | OXYGEN SATURATION: 98 % | TEMPERATURE: 98.3 F | DIASTOLIC BLOOD PRESSURE: 60 MMHG | HEIGHT: 60 IN | WEIGHT: 193.5 LBS

## 2019-12-09 DIAGNOSIS — R10.31 PAIN, ABDOMINAL, RLQ: ICD-10-CM

## 2019-12-09 DIAGNOSIS — R10.31 PAIN, ABDOMINAL, RLQ: Primary | ICD-10-CM

## 2019-12-09 DIAGNOSIS — D50.9 IRON DEFICIENCY ANEMIA, UNSPECIFIED IRON DEFICIENCY ANEMIA TYPE: ICD-10-CM

## 2019-12-09 DIAGNOSIS — M54.2 CERVICAL PAIN (NECK): Primary | ICD-10-CM

## 2019-12-09 PROCEDURE — 97110 THERAPEUTIC EXERCISES: CPT

## 2019-12-09 PROCEDURE — 86038 ANTINUCLEAR ANTIBODIES: CPT

## 2019-12-09 PROCEDURE — 86671 FUNGUS NES ANTIBODY: CPT

## 2019-12-09 PROCEDURE — G0283 ELEC STIM OTHER THAN WOUND: HCPCS

## 2019-12-09 PROCEDURE — 97012 MECHANICAL TRACTION THERAPY: CPT

## 2019-12-09 PROCEDURE — 99213 OFFICE O/P EST LOW 20 MIN: CPT | Performed by: STUDENT IN AN ORGANIZED HEALTH CARE EDUCATION/TRAINING PROGRAM

## 2019-12-09 PROCEDURE — 86256 FLUORESCENT ANTIBODY TITER: CPT

## 2019-12-09 PROCEDURE — 36415 COLL VENOUS BLD VENIPUNCTURE: CPT

## 2019-12-09 NOTE — PROGRESS NOTES
I have seen the patient.  I have reviewed the notes, assessments, and/or procedures performed by Dr. Kramer, I concur with her/his documentation and assessment and plan for Gosia Brown.       This document has been electronically signed by Husam Martinez MD on December 9, 2019 4:47 PM

## 2019-12-09 NOTE — PROGRESS NOTES
"  Family Medicine Residency  Devon Kramer MD    Subjective:     Gosia Brown is a 39 y.o. female who presents for follow up RLQ pain and blood in stool.    3 month hx of feeling \"bloated all the time.\" RLQ pain, sharp, 8/10, intermittent with exquisite acute exacerbations. No aggravating or alleviating factors. Associated with constipation and blood in stool. Patient reports taking senokot-s 4 times daily and drinking plenty of water. She was referred to general surgery who plan a colonoscopy. She was recenlty in the ED two days in a row for RLQ pain. An abd/pelv CT revealed no abnormalities.     She also reports menarche at 12 years old. Periods are regular with menses lasting 5-7 days, heavy flow requiring up to 8 pads for the first several days.     No other complaints today.    The following portions of the patient's history were reviewed and updated as appropriate: allergies, current medications, past family history, past medical history, past social history, past surgical history and problem list.    Past Medical Hx:  Past Medical History:   Diagnosis Date   • Anemia    • Anxiety    • Arthritis    • Back problem    • Bipolar depression (CMS/HCC)    • Depression    • Heart problem    • Heartburn        Past Surgical Hx:  Past Surgical History:   Procedure Laterality Date   • COLONOSCOPY N/A 2/8/2018    Procedure: COLONOSCOPY;  Surgeon: Geoff Frank MD;  Location: [facility] ENDOSCOPY;  Service:    • ENDOSCOPY N/A 2/8/2018    Procedure: ESOPHAGOGASTRODUODENOSCOPY;  Surgeon: Geoff Frank MD;  Location: [facility] ENDOSCOPY;  Service:    • EYE SURGERY Right    • TUBAL ABDOMINAL LIGATION         Current Meds:    Current Outpatient Medications:   •  busPIRone (BUSPAR) 15 MG tablet, Take 15 mg by mouth 4 (Four) Times a Day., Disp: , Rfl:   •  carBAMazepine XR (TEGretol  XR) 400 MG 12 hr tablet, Take 400 mg by mouth Daily., Disp: , Rfl:   •  dicyclomine (BENTYL) 20 MG tablet, Take 1 tablet by mouth " Every 6 (Six) Hours As Needed (abdominal pain, cramping)., Disp: 24 tablet, Rfl: 0  •  ferrous sulfate (IRON SUPPLEMENT) 325 (65 FE) MG tablet, Take 1 tablet by mouth Daily With Breakfast., Disp: 30 tablet, Rfl: 4  •  FLUoxetine (PROzac) 20 MG tablet, , Disp: , Rfl:   •  lisinopril (PRINIVIL,ZESTRIL) 10 MG tablet, Take 1 tablet by mouth Daily., Disp: 30 tablet, Rfl: 2  •  ondansetron ODT (ZOFRAN-ODT) 4 MG disintegrating tablet, Take 1 tablet by mouth Every 6 (Six) Hours As Needed for Nausea or Vomiting., Disp: 15 tablet, Rfl: 0  •  promethazine (PHENERGAN) 12.5 MG tablet, Take 1 tablet by mouth Every 6 (Six) Hours As Needed for Nausea or Vomiting., Disp: 15 tablet, Rfl: 0  •  propranolol (INDERAL) 20 MG tablet, Take 20 mg by mouth 3 (Three) Times a Day As Needed., Disp: , Rfl: 0  •  senna-docusate sodium (SENOKOT-S) 8.6-50 MG tablet, Take 1 tablet by mouth Daily., Disp: 30 tablet, Rfl: 4  •  traZODone (DESYREL) 150 MG tablet, Take 300 mg by mouth Every Night., Disp: , Rfl:     Allergies:  No Known Allergies    Family Hx:  Family History   Problem Relation Age of Onset   • Arthritis Mother    • Hypertension Mother    • Ovarian cancer Mother    • Breast cancer Maternal Aunt    • Colon cancer Maternal Uncle    • Lung cancer Maternal Uncle    • Heart disease Maternal Grandfather    • Stroke Maternal Grandfather    • Cancer Maternal Grandfather    • Lung cancer Maternal Grandfather    • Lung cancer Maternal Aunt    • Cancer Maternal Grandmother         Social History:  Social History     Socioeconomic History   • Marital status: Single     Spouse name: Not on file   • Number of children: Not on file   • Years of education: Not on file   • Highest education level: Not on file   Tobacco Use   • Smoking status: Never Smoker   • Smokeless tobacco: Never Used   Substance and Sexual Activity   • Alcohol use: No   • Drug use: No   • Sexual activity: Defer       Review of Systems  Review of Systems   Constitutional: Negative for  "appetite change, diaphoresis, fatigue and fever.   HENT: Negative for ear pain, postnasal drip, rhinorrhea and sore throat.    Eyes: Negative for pain and visual disturbance.   Respiratory: Negative for cough, chest tightness and shortness of breath.    Cardiovascular: Negative for chest pain, palpitations and leg swelling.   Gastrointestinal: Positive for abdominal pain, blood in stool, constipation and nausea. Negative for diarrhea and vomiting.   Genitourinary: Negative for dysuria, flank pain, frequency and urgency.   Musculoskeletal: Negative for arthralgias, back pain and myalgias.   Skin: Negative for pallor and rash.   Neurological: Negative for dizziness, seizures, syncope, weakness and numbness.   Psychiatric/Behavioral: Negative for agitation, confusion, decreased concentration and dysphoric mood.       Objective:     /60   Pulse 116   Temp 98.3 °F (36.8 °C) (Temporal)   Ht 152.4 cm (60\")   Wt 87.8 kg (193 lb 8 oz)   SpO2 98%   BMI 37.79 kg/m²   Physical Exam   Constitutional: She is oriented to person, place, and time. She appears well-developed and well-nourished.   HENT:   Head: Normocephalic and atraumatic.   Right Ear: External ear normal.   Left Ear: External ear normal.   Nose: Nose normal.   Eyes: Pupils are equal, round, and reactive to light. Conjunctivae and EOM are normal.   Neck: Normal range of motion. Neck supple. No thyromegaly present.   Cardiovascular: Normal rate, regular rhythm, normal heart sounds and intact distal pulses. Exam reveals no gallop and no friction rub.   No murmur heard.  Pulmonary/Chest: Effort normal and breath sounds normal. No respiratory distress. She has no wheezes.   Abdominal: Soft. Bowel sounds are normal. She exhibits no distension. There is tenderness (RLQ and LLQ).   Musculoskeletal: Normal range of motion. She exhibits no tenderness.   Lymphadenopathy:     She has no cervical adenopathy.   Neurological: She is alert and oriented to person, place, " and time. She displays normal reflexes.   Skin: Skin is warm and dry. Capillary refill takes less than 2 seconds.   Psychiatric: She has a normal mood and affect. Her behavior is normal.        Assessment/Plan:     Gosia was seen today for hypertension.    Diagnoses and all orders for this visit:    Pain, abdominal, RLQ  Patient with chronic abdominal pain and no findings on CT abd/pelvis. Patient to have colonoscopy done. Routine lab work thus far has no elucidated a reason to her abdominal pain. Patient to be seen by GI this Friday. Will order autoimmune work up as well all transvaginal US for suspected ovarian cyst or other pathology.   -     Inflammatory Bowel Disease Panel; Future  -     ANTHONY by IFA, Reflex 9-biomarkers profile; Future  -     US Non-ob Transvaginal; Future    Iron deficiency anemia, unspecified iron deficiency anemia type  Labs show iron def anemia. Recommended OTC iron supplement. This problem may be exacerbated or complicated by possible autoimmune disease.       · Rx changes: none  · Patient Education: diet and exercise  · Compliance at present is estimated to be fair.   · Efforts to improve compliance (if necessary) will be directed at increased exercise.     Follow-up:     Return in about 1 month (around 1/9/2020) for Recheck abd.    Preventative:  Health Maintenance   Topic Date Due   • ANNUAL PHYSICAL  11/15/1982   • PAP SMEAR  12/06/2017   • TDAP/TD VACCINES (2 - Td) 10/14/2029   • INFLUENZA VACCINE  Completed     Female Preventative: UTD  Recommended: none  Vaccine Counseling: N/A    Weight  -Class: Obese Class II: 35-39.9kg/m2  -Patient's Body mass index is 37.79 kg/m². BMI is above normal parameters. Recommendations include: exercise counseling and nutrition counseling.   eat more fruits and vegetables, decrease soda or juice intake, increase water intake, increase physical activity, cut out extra servings, reduce fast food intake, plan meals and have 3 meals a day    Alcohol use:   reports that she does not drink alcohol.  Nicotine status  reports that she has never smoked. She has never used smokeless tobacco.      Goals    None         RISK SCORE: 4        Devon Kramer M.D. PGY2  [facility]  [address] [place] [address]  Office: [phone]    This document has been electronically signed by Devon Kramer MD on December 9, 2019 2:55 PM

## 2019-12-09 NOTE — THERAPY TREATMENT NOTE
Outpatient Physical Therapy Ortho Treatment Note  [facility]     Patient Name: Gosia Brown  : 1980  MRN: [id number]  Today's Date: 2019      Visit Date: 2019     Visit 4/4  % improvement: not assessed  Return to MD: ESTUARDO  Re-cert date: 19    Visit Dx:    ICD-10-CM ICD-9-CM   1. Cervical pain (neck) M54.2 723.1       Patient Active Problem List   Diagnosis   • Blood in stool   • Nausea   • Bloating   • Generalized abdominal pain   • Change in bowel habits   • Right knee pain   • Internal derangement of right knee   • Abnormal uterine bleeding   • Family history of breast cancer   • Family history of ovarian cancer   • Menorrhagia   • PAT (paroxysmal atrial tachycardia) (CMS/HCC)   • Uterine fibroid   • Vasovagal syncope   • Cervical pain (neck)   • Morbidly obese (CMS/HCC)        Past Medical History:   Diagnosis Date   • Anemia    • Anxiety    • Arthritis    • Back problem    • Bipolar depression (CMS/HCC)    • Depression    • Heart problem    • Heartburn         Past Surgical History:   Procedure Laterality Date   • COLONOSCOPY N/A 2018    Procedure: COLONOSCOPY;  Surgeon: Geoff Frank MD;  Location: [facility] ENDOSCOPY;  Service:    • ENDOSCOPY N/A 2018    Procedure: ESOPHAGOGASTRODUODENOSCOPY;  Surgeon: Geoff Frank MD;  Location: [facility] ENDOSCOPY;  Service:    • EYE SURGERY Right    • TUBAL ABDOMINAL LIGATION                         PT Assessment/Plan     Row Name 19 1600          PT Assessment    Assessment Comments  Shay traction well. No inc pain with cervical traction. Dec in pain post tx.   (Pended)   -AY        PT Plan    PT Plan Comments  Cont POC. Monitor response to cervical traction.  (Pended)   -AY       User Key  (r) = Recorded By, (t) = Taken By, (c) = Cosigned By    Initials Name Provider Type    AY Karyna Juares, PT Student PT Student          Modalities     Row Name 19 1500             Ice    Ice Applied  Yes  (Pended)   -AY       Location  neck  (Pended)   -AY      Rx Minutes  15 mins  (Pended)   -AY      Ice S/P Rx  Yes  (Pended)   -AY         ELECTRICAL STIMULATION    Attended/Unattended  Unattended  (Pended)   -AY      Stimulation Type  IFC  (Pended)   -AY      Location/Electrode Placement/Other  upper and lower part of upper trap  (Pended)   -AY       PT E-Stim Unattended (Manual) Minutes  15  (Pended)   -AY         Traction 34666    Traction Type  Cervical  (Pended)   -AY      Rx Minutes  15  (Pended)   -AY      Duration  Intermittent  (Pended)   -AY      Position  Hook-lying  (Pended)   -AY      Weight  --  (Pended)  12/7  -AY        User Key  (r) = Recorded By, (t) = Taken By, (c) = Cosigned By    Initials Name Provider Type    AY Karyna Juares, PT Student PT Student        OP Exercises     Row Name 12/09/19 1500             Subjective Comments    Subjective Comments  Patient states she is feeling better overall but has a headhache from her neck pain. Reports last visit they worked on shoulder and did trunk rotations that caused lots of pain.  (Pended)   -AY         Subjective Pain    Able to rate subjective pain?  yes  (Pended)   -AY      Pre-Treatment Pain Level  7  (Pended)   -AY      Post-Treatment Pain Level  0  (Pended)   -AY         Exercise 1    Exercise Name 1  Pro II L2 warm up  (Pended)   -AY      Time 1  10'(5' fwd, 5' bwd)  (Pended)   -AY         Exercise 2    Exercise Name 2  Doorway stretch  (Pended)   -AY      Sets 2  3  (Pended)   -AY      Time 2  30''  (Pended)   -AY         Exercise 3    Exercise Name 3  Upper trap stretch  (Pended)   -AY      Sets 3  3  (Pended)   -AY      Time 3  30''  (Pended)   -AY      Additional Comments  bilateral  (Pended)   -AY         Exercise 4    Exercise Name 4  Levator Scapula Stretch  (Pended)   -AY      Sets 4  3  (Pended)   -AY      Time 4  30''  (Pended)   -AY      Additional Comments  bilateral  (Pended)   -AY         Exercise 5    Exercise Name 5  cervical traction   (Pended)   -AY      Time 5  --  (Pended)   -AY      Additional Comments  see modalities  (Pended)   -AY         Exercise 6    Exercise Name 6  estim and ice  (Pended)   -AY      Additional Comments  see modalities  (Pended)   -AY        User Key  (r) = Recorded By, (t) = Taken By, (c) = Cosigned By    Initials Name Provider Type    Karyna Booth, PT Student PT Student                      Manual Rx (last 36 hours)      Manual Treatments     Row Name 12/09/19 1500             Manual Rx 1    Manual Rx 1 Location  cervical traction  (Pended)   -AY      Manual Rx 1 Duration  5'  (Pended)   -AY        User Key  (r) = Recorded By, (t) = Taken By, (c) = Cosigned By    Initials Name Provider Type    Karyna Booth, PT Student PT Student          PT OP Goals     Row Name 12/09/19 1500          PT Short Term Goals    STG Date to Achieve  12/16/19  (Pended)   -AY     STG 1  Pt indep with HEP  (Pended)   -AY     STG 1 Progress  Progressing  (Pended)   -AY     STG 2  Improve R cervical rotation AROM to WFL  (Pended)   -AY     STG 2 Progress  Progressing  (Pended)   -AY     STG 3  Reduce HA pain by 50% during the day  (Pended)   -AY     STG 3 Progress  Progressing  (Pended)   -AY     STG 4  Reduce neck pain by 75% performing daily tasks  (Pended)   -AY     STG 4 Progress  Progressing  (Pended)   -AY     STG 5  Reduce NDI scrore to </= 20/50  (Pended)   -AY     STG 5 Progress  Progressing  (Pended)   -AY       User Key  (r) = Recorded By, (t) = Taken By, (c) = Cosigned By    Initials Name Provider Type    Karyna Booth, PT Student PT Student                         Time Calculation:   Start Time: (P) 1515  Stop Time: (P) 1630  Time Calculation (min): (P) 75 min  Total Timed Code Minutes- PT: (P) 60 minute(s)  Therapy Charges for Today     Code Description Service Date Service Provider Modifiers Qty    31696517006 HC PT THER PROC EA 15 MIN 12/9/2019 Karyna Juares, PT Student GP 3    62882615929 HC PT  ELECTRICAL STIM UNATTENDED 12/9/2019 Karyna Juares, PT Student  1    65685594371 HC PT-TRACTION MECHANICAL 12/9/2019 Karyna Juares, PT Student  1                    Karyna Juares, PT Student  12/9/2019

## 2019-12-12 LAB
ANA SER QL IA: NEGATIVE
Lab: NORMAL

## 2019-12-13 LAB
BAKER'S YEAST IGG QN: <20 UNITS (ref 0–24.9)
P-ANCA ATYPICAL TITR SER IF: NORMAL TITER
SACCHAROMYCES CEREVISIAE AB IGA: <20 UNITS (ref 0–24.9)

## 2019-12-17 ENCOUNTER — APPOINTMENT (OUTPATIENT)
Dept: PHYSICAL THERAPY | Facility: HOSPITAL | Age: 39
End: 2019-12-17

## 2019-12-19 ENCOUNTER — HOSPITAL ENCOUNTER (OUTPATIENT)
Dept: PHYSICAL THERAPY | Facility: HOSPITAL | Age: 39
Setting detail: THERAPIES SERIES
Discharge: HOME | End: 2019-12-19

## 2019-12-19 DIAGNOSIS — M54.2 CERVICAL PAIN (NECK): Primary | ICD-10-CM

## 2019-12-19 PROCEDURE — 97140 MANUAL THERAPY 1/> REGIONS: CPT

## 2019-12-19 PROCEDURE — 97535 SELF CARE MNGMENT TRAINING: CPT

## 2019-12-20 NOTE — THERAPY PROGRESS REPORT/RE-CERT
Outpatient Physical Therapy Ortho Progress Note  [facility]     Patient Name: Gosia Brown  : 1980  MRN: [id number]  Today's Date: 2019      Visit Date: 2019    Visit Dx:    ICD-10-CM ICD-9-CM   1. Cervical pain (neck) M54.2 723.1       Patient Active Problem List   Diagnosis   • Blood in stool   • Nausea   • Bloating   • Generalized abdominal pain   • Change in bowel habits   • Right knee pain   • Internal derangement of right knee   • Abnormal uterine bleeding   • Family history of breast cancer   • Family history of ovarian cancer   • Menorrhagia   • PAT (paroxysmal atrial tachycardia) (CMS/HCC)   • Uterine fibroid   • Vasovagal syncope   • Cervical pain (neck)   • Morbidly obese (CMS/HCC)        Past Medical History:   Diagnosis Date   • Anemia    • Anxiety    • Arthritis    • Back problem    • Bipolar depression (CMS/HCC)    • Depression    • Heart problem    • Heartburn         Past Surgical History:   Procedure Laterality Date   • COLONOSCOPY N/A 2018    Procedure: COLONOSCOPY;  Surgeon: Geoff Frank MD;  Location: [facility] ENDOSCOPY;  Service:    • ENDOSCOPY N/A 2018    Procedure: ESOPHAGOGASTRODUODENOSCOPY;  Surgeon: Geoff Frank MD;  Location: [facility] ENDOSCOPY;  Service:    • EYE SURGERY Right    • TUBAL ABDOMINAL LIGATION         PT Ortho     Row Name 19 1700       Subjective Pain    Able to rate subjective pain?  yes  -    Pre-Treatment Pain Level  4  -    Post-Treatment Pain Level  0  -    Row Name 19 1600       Subjective Comments    Subjective Comments  Pt reports don't know why, but had a series of bad HAs since the last session, causing her to miss last scheduled session.  Pt states she was feeling really good and only noted that she's been reading more often now.  -       Cervical/Shoulder ROM Screen    Cervical lateral flexion  Impaired B 30% limitation  -AH    Cervical rotation  Impaired B 30% limitation  -       Cervical  Palpation    Cervical Palpation- Location?  Occiput;Suboccipital;Cervical facets;Spinous process;Temporalis  -    Occiput  Right:;Tender;Guarded/taut;Trigger point  -    Suboccipital  Bilateral:;Tender;Guarded/taut  -    Cervical Facets  Right:;Tender;Guarded/taut;Trigger point C4 splenius capitus  -    Temporalis  Right:;Tender;Guarded/taut  -      User Key  (r) = Recorded By, (t) = Taken By, (c) = Cosigned By    Initials Name Provider Type     Claude Neal, PT Physical Therapist                      PT Assessment/Plan     Row Name 12/19/19 2782          PT Assessment    Functional Limitations  Limitation in home management;Limitations in community activities;Decreased safety during functional activities;Limitations in functional capacity and performance;Performance in leisure activities;Performance in self-care ADL;Performance in work activities  -     Impairments  Impaired flexibility;Joint mobility;Muscle strength;Pain;Poor body mechanics;Posture;Range of motion;Impaired muscle endurance  -     Assessment Comments  Pt returns today for reassessment.  Pt is overall progressing well and responding well to PT interventions and activities.  However, pt had a set back of unknown mechanism, possibly due to mechanical traction being too intense (despite pt reporting feeling well and pretty good after completion) and possibly due to pt just simply sleeping with a pillow again and reading more.  Pt responded very well to interventions this session and apparent resolution of Sx.  Pt educated on DOMS posibility post manual work today.  -     Please refer to paper survey for additional self-reported information  Yes  -AH     Rehab Potential  Good  -     Patient/caregiver participated in establishment of treatment plan and goals  Yes  -     Patient would benefit from skilled therapy intervention  Yes  -AH        PT Plan    PT Frequency  2x/week  -     Predicted Duration of Therapy Intervention  (Therapy Eval)  3-4 wks  -     Planned CPT's?  PT RE-EVAL: 91404;PT THER PROC EA 15 MIN: 84349;PT THER ACT EA 15 MIN: 93991;PT MANUAL THERAPY EA 15 MIN: 49723;PT NEUROMUSC RE-EDUCATION EA 15 MIN: 79449;PT SELF CARE/HOME MGMT/TRAIN EA 15: 81828;PT ELECTRICAL STIM UNATTEND: ;PT TRACTION CERVICAL: 54264;PT HOT/COLD PACK WC NONMCARE: 50925;PT THER SUPP EA 15 MIN  -     PT Plan Comments  Continue POC and progress as able.  Sx management via manual, modalities, traction as neded & focus on cervical/shld girdle strength/stability for prolonged tissue function/benefit.  -       User Key  (r) = Recorded By, (t) = Taken By, (c) = Cosigned By    Initials Name Provider Type     Claude Neal, PT Physical Therapist            OP Exercises     Row Name 12/19/19 1700 12/19/19 1600          Subjective Comments    Subjective Comments  --  Pt reports don't know why, but had a series of bad HAs since the last session, causing her to miss last scheduled session.  Pt states she was feeling really good and only noted that she's been reading more often now.  -        Subjective Pain    Able to rate subjective pain?  yes  -  --     Pre-Treatment Pain Level  4  -  --     Post-Treatment Pain Level  0  -  --        Total Minutes    30326 - PT Therapeutic Activity Minutes  5  -  --     37807 - PT Manual Therapy Minutes  25  -  --        Exercise 1    Exercise Name 1  self STM/MFR/release c tennis ball instruction  -  --     Cueing 1  Verbal;Tactile  -  --     Additional Comments  suboccipital & cervical paraspinal releases  -  --        Exercise 2    Exercise Name 2  Dynamic cerv isometrics on wall (maintain chin tuck neutral position)  -  --     Cueing 2  Verbal;Tactile;Demo  -  --     Sets 2  1  -  --     Reps 2  10  -  --     Additional Comments  HEP  -  --       User Key  (r) = Recorded By, (t) = Taken By, (c) = Cosigned By    Initials Name Provider Type     Claude Neal, PT Physical  Therapist                      Manual Rx (last 36 hours)      Manual Treatments     Row Name 12/19/19 1700             Total Minutes    98875 - PT Manual Therapy Minutes  25  -AH         Manual Rx 1    Manual Rx 1 Location  Cervical & cranium  -      Manual Rx 1 Type  connective tissue mobilization, TPR, temporal rocking, facial/trigeminal nn releases, R C1 FRSR lesion correction; superficial-moderately deep intensities c deep breathing, mandibular, facial flexing drivers for tissue movement facilitation  -      Manual Rx 1 Grade  2-3  -      Manual Rx 1 Duration  30'  -        User Key  (r) = Recorded By, (t) = Taken By, (c) = Cosigned By    Initials Name Provider Type    Claude Canada, PT Physical Therapist          PT OP Goals     Row Name 12/19/19 1600          PT Short Term Goals    STG Date to Achieve  12/16/19  -     STG 1  Pt indep with HEP  -     STG 1 Progress  Progressing  -     STG 2  Improve R cervical rotation AROM to WFL  -     STG 2 Progress  Progressing  -     STG 3  Reduce HA pain by 50% during the day  -     STG 3 Progress  Progressing  -     STG 4  Reduce neck pain by 75% performing daily tasks  -     STG 4 Progress  Progressing  -     STG 5  Reduce NDI scrore to </= 20/50  -     STG 5 Progress  Progressing  -       User Key  (r) = Recorded By, (t) = Taken By, (c) = Cosigned By    Initials Name Provider Type    Claude Canada, PT Physical Therapist          Therapy Education  Education Details: HEP, see flow sheet; symptomology  Given: HEP, Symptoms/condition management, Pain management, Posture/body mechanics  Program: Progressed, Modified  How Provided: Verbal, Demonstration  Provided to: Patient  Level of Understanding: Teach back education performed, Verbalized, Demonstrated  33603 - PT Self Care/Mgmt Minutes: 15    Outcome Measure Options: Neck Disability Index (NDI)  Neck Disability Index  Section 1 - Pain Intensity: The pain is moderate at the  moment.  Section 2 - Personal Care: I can look after myself normally, but it causes extra pain.  Section 3 - Lifting: Pain prevents me from lifting heavy weights off the floor but I can manage if items are conveniently positioned, ie. on a table.  Section 4 - Work: I can do most of my usual work, but no more  Section 5 - Headaches: I have moderate headaches that come frequently  Section 6 - Concentration: I can concentrate fully with slight difficulty.  Section 7 - Sleeping: My sleep is greatly disturbed for up to 3-5 hours.  Section 8 - Driving: I can drive as long as I want with slight neck pain.  Section 9 - Reading: I can read as much as I want with moderate neck pain.  Section 10 - Recreation: I have some neck pain with a few recreational activities.  Neck Disability Index Score: 20  Neck Disability Index Comments: 40%  disability      Time Calculation:   Start Time: 1645  Stop Time: 1730  Time Calculation (min): 45 min  Therapy Charges for Today     Code Description Service Date Service Provider Modifiers Qty    14265711491 HC PT MANUAL THERAPY EA 15 MIN 12/19/2019 Claude Neal, PT GP 2    36788117110 HC PT SELF CARE/MGMT/TRAIN EA 15 MIN 12/19/2019 Claude Neal, PT GP 1          PT G-Codes  Outcome Measure Options: Neck Disability Index (NDI)  Neck Disability Index Score: 20         Claude Neal PT  12/19/2019

## 2020-01-03 ENCOUNTER — APPOINTMENT (OUTPATIENT)
Dept: PHYSICAL THERAPY | Facility: HOSPITAL | Age: 40
End: 2020-01-03

## 2020-01-13 ENCOUNTER — APPOINTMENT (OUTPATIENT)
Dept: PHYSICAL THERAPY | Facility: HOSPITAL | Age: 40
End: 2020-01-13

## 2020-01-15 ENCOUNTER — APPOINTMENT (OUTPATIENT)
Dept: PHYSICAL THERAPY | Facility: HOSPITAL | Age: 40
End: 2020-01-15

## 2020-01-17 ENCOUNTER — HOSPITAL ENCOUNTER (OUTPATIENT)
Dept: PHYSICAL THERAPY | Facility: HOSPITAL | Age: 40
Setting detail: THERAPIES SERIES
Discharge: HOME | End: 2020-01-17

## 2020-01-17 DIAGNOSIS — M54.2 CERVICAL PAIN (NECK): Primary | ICD-10-CM

## 2020-01-17 PROCEDURE — 97140 MANUAL THERAPY 1/> REGIONS: CPT | Performed by: PHYSICAL THERAPIST

## 2020-01-17 PROCEDURE — 97530 THERAPEUTIC ACTIVITIES: CPT | Performed by: PHYSICAL THERAPIST

## 2020-01-18 NOTE — THERAPY PROGRESS REPORT/RE-CERT
"    Outpatient Physical Therapy Ortho Progress Note  [facility]     Patient Name: Gosia Brown  : 1980  MRN: [id number]  Today's Date: 2020      Visit Date: 2020  Attendance:  (8 visits approved 2020 - 2020, 1 used)  Subjective Improvement: 50%  Next MD Appt: none scheduled with PCP  Recert Date: 2020    Therapy Diagnosis: Neck pain         Past Medical History:   Diagnosis Date   • Anemia    • Anxiety    • Arthritis    • Back problem    • Bipolar depression (CMS/HCC)    • Depression    • Heart problem    • Heartburn         Past Surgical History:   Procedure Laterality Date   • COLONOSCOPY N/A 2018    Procedure: COLONOSCOPY;  Surgeon: Geoff Frank MD;  Location: [facility] ENDOSCOPY;  Service:    • ENDOSCOPY N/A 2018    Procedure: ESOPHAGOGASTRODUODENOSCOPY;  Surgeon: Geoff Frank MD;  Location: [facility] ENDOSCOPY;  Service:    • EYE SURGERY Right    • TUBAL ABDOMINAL LIGATION         Visit Dx:     ICD-10-CM ICD-9-CM   1. Cervical pain (neck) M54.2 723.1             PT Ortho     Row Name 20 1300       Subjective Comments    Subjective Comments  Patient has not been back in therapy since 19 due to waiting on insurance authorization. She states that her neck pain is worse since she has been out of therapy. She also notes that her headaches has returned. 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 Feels better for her cervical spine to be extended. Cervical flexion, cervical R rotation, and lifting \"a lot\" increases her pain. No medication changes since last recheck. 50% subjective improvement.   -SS       Precautions and Contraindications    Precautions/Limitations  no known precautions/limitations  -SS       Subjective Pain    Able to rate subjective pain?  yes  -SS    Pre-Treatment Pain Level  4  -SS    Post-Treatment Pain Level  0  -SS       Cervical/Shoulder ROM Screen    " "Cervical flexion  Normal \"pop\" in neck  -    Cervical extension  Normal  -SS    Cervical lateral flexion  Normal  -SS    Cervical rotation  Normal pain with full R rotation  -       Cervical/Thoracic Special Tests    Cervical/Thoracic Special Tests Comments  \"Sore\" with palpation R C7/T1. \"Sore\" at R C3/4 but relieves headache. C3/4 L rotated. Increased tension R cervical paraspinals.   -      User Key  (r) = Recorded By, (t) = Taken By, (c) = Cosigned By    Initials Name Provider Type     Brijesh Thornton, PT DPT Physical Therapist          Therapy Education  Education Details: supine chin tucks, transfers, gait belt and slide board use  Given: HEP, Symptoms/condition management, Posture/body mechanics  How Provided: Verbal, Demonstration  Provided to: Patient  Level of Understanding: Verbalized, Demonstrated     PT OP Goals     Row Name 01/17/20 1300          PT Short Term Goals    STG Date to Achieve  02/07/20  -     STG 1  Pt indep with HEP  -     STG 1 Progress  Not Met;Ongoing  -     STG 2  Improve R cervical rotation AROM to WFL  -     STG 2 Progress  Met  -     STG 3  Reduce HA pain by 50% during the day  -     STG 3 Progress  Not Met;Ongoing  -     STG 4  Reduce neck pain by 75% performing daily tasks  -     STG 4 Progress  Not Met;Ongoing  -     STG 5  Reduce NDI score to </= 20/50  -     STG 5 Progress  Not Met;Ongoing  -        Time Calculation    PT Goal Re-Cert Due Date  02/07/20  -       User Key  (r) = Recorded By, (t) = Taken By, (c) = Cosigned By    Initials Name Provider Type    Brijesh Mahoney, PT DPT Physical Therapist          PT Assessment/Plan     Row Name 01/17/20 1300          PT Assessment    Functional Limitations  Limitations in functional capacity and performance;Performance in leisure activities;Performance in self-care ADL;Performance in work activities;Limitations in community activities;Limitation in home management  -     " "Impairments  Joint mobility;Muscle strength;Pain;Poor body mechanics;Posture;Impaired muscle endurance  -SS     Assessment Comments  Patient has been out of the clinic due to insurance considerations and work for past 1 month. Her headache and neck pain were relieved by cervical joint mobilization. Time was spent instructing patient in gait belt and slide board use and performing sit-to-stand and stand-pivot transfers with emphasis on safety for both her and the person she is transferring. Patient demonstrated ability to perform near dependent stand-pivot transfer to this P.T.   -     Rehab Potential  Good  -SS     Patient/caregiver participated in establishment of treatment plan and goals  Yes  -SS     Patient would benefit from skilled therapy intervention  Yes  -SS        PT Plan    PT Frequency  2x/week  -SS     Predicted Duration of Therapy Intervention (Therapy Eval)  2-3 weeks  -SS     PT Plan Comments  Cervical muscle strengthening (deep neck flexors next), manual therapy (joint mobilization, MFR, ME) as indicated, transfer training. Hold mechanical traction at this time.   -SS       User Key  (r) = Recorded By, (t) = Taken By, (c) = Cosigned By    Initials Name Provider Type    SS Brijesh Thornton, PT DPT Physical Therapist            OP Exercises     Row Name 01/17/20 1300          Subjective Comments    Subjective Comments  Patient has not been back in therapy since 12/19/19 due to waiting on insurance authorization. She states that her neck pain is worse since she has been out of therapy. She also notes that her headaches has returned. 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 Feels better for her cervical spine to be extended. Cervical flexion, cervical R rotation, and lifting \"a lot\" increases her pain. No medication changes since last recheck. 50% subjective improvement.   -SS        Subjective Pain    Able to rate subjective " pain?  yes  -SS     Pre-Treatment Pain Level  4  -SS     Post-Treatment Pain Level  0  -SS        Exercise 1    Exercise Name 1  recheck  -SS        Exercise 2    Exercise Name 2  see Manual Therapy  -SS        Exercise 3    Exercise Name 3  Supine chin tuck  -SS     Cueing 3  Verbal  -SS     Sets 3  1  -SS     Reps 3  10  -SS     Time 3  5 sec hold  -SS        Exercise 4    Exercise Name 4  Instruction in gait belt and slide board use and transfer mechanics  -SS       User Key  (r) = Recorded By, (t) = Taken By, (c) = Cosigned By    Initials Name Provider Type    Brijesh Mahoney, PT DPT Physical Therapist           Manual Rx (last 36 hours)      Manual Treatments     Row Name 01/17/20 1300             Manual Rx 1    Manual Rx 1 Location  cervical spine  -SS      Manual Rx 1 Type  ME rotation correction  -SS         Manual Rx 2    Manual Rx 2 Location  Cervical spine  -SS      Manual Rx 2 Type  joint mobilization  -SS      Manual Rx 2 Grade  4  -SS         Manual Rx 3    Manual Rx 3 Location  Cervical spine  -SS      Manual Rx 3 Type  MFR  -SS         Manual Rx 4    Manual Rx 4 Location  Cervical spine  -SS      Manual Rx 4 Type  manual distraction  -SS      Manual Rx 4 Grade  2/3  -SS        User Key  (r) = Recorded By, (t) = Taken By, (c) = Cosigned By    Initials Name Provider Type    Brijesh Mahoney, PT DPT Physical Therapist         Outcome Measure Options: Neck Disability Index (NDI)  Neck Disability Index  Section 1 - Pain Intensity: The pain is very severe at the moment.  Section 2 - Personal Care: I can look after myself normally, but it causes extra pain.  Section 3 - Lifting: I can lift heavy weights, but it gives me extra pain.  Section 4 - Work: I can do most of my usual work, but no more  Section 5 - Headaches: I have headaches almost all the time.  Section 6 - Concentration: I can concentrate fully with slight difficulty.  Section 7 - Sleeping: My sleep is greatly disturbed for up  to 3-5 hours.  Section 8 - Driving: I can drive as long as I want with slight neck pain.  Section 9 - Reading: I can read as much as I want with slight neck pain.  Section 10 - Recreation: I have some neck pain with a few recreational activities.  Neck Disability Index Score: 22      Time Calculation:     Start Time: 1300  Stop Time: 1344  Time Calculation (min): 44 min     Therapy Charges for Today     Code Description Service Date Service Provider Modifiers Qty    68958927715 HC PT MANUAL THERAPY EA 15 MIN 1/17/2020 Brijesh Thornton, PT DPT GP 1    26062163419 HC PT THERAPEUTIC ACT EA 15 MIN 1/17/2020 Brijesh Thornton, PT DPT GP 2                   Brijesh Thornton, PT, DPT, CHT  1/17/2020

## 2020-01-24 ENCOUNTER — OFFICE VISIT (OUTPATIENT)
Dept: GASTROENTEROLOGY | Facility: CLINIC | Age: 40
End: 2020-01-24

## 2020-01-24 VITALS
DIASTOLIC BLOOD PRESSURE: 82 MMHG | BODY MASS INDEX: 37.15 KG/M2 | HEIGHT: 60 IN | SYSTOLIC BLOOD PRESSURE: 128 MMHG | WEIGHT: 189.2 LBS | HEART RATE: 82 BPM

## 2020-01-24 DIAGNOSIS — K92.1 BLOOD IN STOOL: ICD-10-CM

## 2020-01-24 DIAGNOSIS — R10.13 EPIGASTRIC PAIN: Primary | ICD-10-CM

## 2020-01-24 PROBLEM — I10 HYPERTENSION: Status: ACTIVE | Noted: 2020-01-24

## 2020-01-24 PROCEDURE — 99214 OFFICE O/P EST MOD 30 MIN: CPT | Performed by: INTERNAL MEDICINE

## 2020-01-24 RX ORDER — DEXTROSE AND SODIUM CHLORIDE 5; .45 G/100ML; G/100ML
30 INJECTION, SOLUTION INTRAVENOUS CONTINUOUS PRN
Status: CANCELLED | OUTPATIENT
Start: 2020-01-28

## 2020-01-24 NOTE — PROGRESS NOTES
[facility]      Chief Complaint:   Chief Complaint   Patient presents with   • Rectal Bleeding       Subjective     HPI:   Patient with blood in her stool approximately 4 months ago.  Patient states that she feels as though she needs to have bowel movement at times.  Patient also with epigastric abdominal pains and episodes of nausea.  Patient has not had any vomiting.    Plan; we will schedule patient for EGD and colonoscopy to evaluate.  Patient follow-up in GI office following these procedures to discuss what the cause of this symptoms are.    Past Medical History:   Past Medical History:   Diagnosis Date   • Anemia    • Anxiety    • Arthritis    • Back problem    • Bipolar depression (CMS/HCC)    • Depression    • Heart problem    • Heartburn        Past Surgical History:  Past Surgical History:   Procedure Laterality Date   • COLONOSCOPY N/A 2/8/2018    Procedure: COLONOSCOPY;  Surgeon: Geoff Frank MD;  Location: [facility] ENDOSCOPY;  Service:    • ENDOSCOPY N/A 2/8/2018    Procedure: ESOPHAGOGASTRODUODENOSCOPY;  Surgeon: Geoff Frank MD;  Location: [facility] ENDOSCOPY;  Service:    • EYE SURGERY Right    • TUBAL ABDOMINAL LIGATION         Family History:  Family History   Problem Relation Age of Onset   • Arthritis Mother    • Hypertension Mother    • Ovarian cancer Mother    • Breast cancer Maternal Aunt    • Colon cancer Maternal Uncle    • Lung cancer Maternal Uncle    • Heart disease Maternal Grandfather    • Stroke Maternal Grandfather    • Cancer Maternal Grandfather    • Lung cancer Maternal Grandfather    • Lung cancer Maternal Aunt    • Cancer Maternal Grandmother        Social History:   reports that she has never smoked. She has never used smokeless tobacco. She reports that she does not drink alcohol or use drugs.    Medications:   Prior to Admission medications    Medication Sig Start Date End Date Taking? Authorizing Provider   carBAMazepine XR (TEGretol  XR) 400 MG 12  hr tablet Take 400 mg by mouth Daily.   Yes Saurabh Perkins MD   dicyclomine (BENTYL) 20 MG tablet Take 1 tablet by mouth Every 6 (Six) Hours As Needed (abdominal pain, cramping). 12/3/19  Yes Matt Laguerre MD   ferrous sulfate (IRON SUPPLEMENT) 325 (65 FE) MG tablet Take 1 tablet by mouth Daily With Breakfast. 11/14/19  Yes Devon Kramer MD   FLUoxetine (PROzac) 20 MG tablet  7/19/18  Yes Saurabh Perkins MD   lisinopril (PRINIVIL,ZESTRIL) 10 MG tablet Take 1 tablet by mouth Daily. 11/12/19  Yes Devon Kramer MD   ondansetron ODT (ZOFRAN-ODT) 4 MG disintegrating tablet Take 1 tablet by mouth Every 6 (Six) Hours As Needed for Nausea or Vomiting. 12/3/19  Yes Matt Laguerre MD   promethazine (PHENERGAN) 12.5 MG tablet Take 1 tablet by mouth Every 6 (Six) Hours As Needed for Nausea or Vomiting. 12/3/19  Yes Matt Laguerre MD   propranolol (INDERAL) 20 MG tablet Take 20 mg by mouth 3 (Three) Times a Day As Needed. 8/28/19  Yes Saurabh Perkins MD   senna-docusate sodium (SENOKOT-S) 8.6-50 MG tablet Take 1 tablet by mouth Daily. 11/14/19  Yes Devon Kramer MD   polyethylene glycol (GoLYTELY) 236 g solution Starting at noon on day prior to procedure, drink 8 ounces every 30 minutes until all gone or stools are clear. May add flavor packet. 1/24/20   Geoff Frank MD       Allergies:  Patient has no known allergies.    ROS:    Review of Systems   Constitutional: Negative for activity change, appetite change, chills, diaphoresis, fatigue, fever and unexpected weight change.   HENT: Negative for sore throat and trouble swallowing.    Respiratory: Negative for shortness of breath.    Gastrointestinal: Negative for abdominal distention, abdominal pain, anal bleeding, blood in stool, constipation, diarrhea, nausea, rectal pain and vomiting.   Endocrine: Negative for polydipsia, polyphagia and polyuria.   Genitourinary: Negative for difficulty urinating.   Musculoskeletal:  "Negative for arthralgias.   Skin: Negative for pallor.   Allergic/Immunologic: Negative for food allergies.   Neurological: Negative for weakness and light-headedness.   Psychiatric/Behavioral: Negative for behavioral problems.     Objective     Blood pressure 128/82, pulse 82, height 152.4 cm (60\"), weight 85.8 kg (189 lb 3.2 oz).    Physical Exam   Constitutional: She is oriented to person, place, and time. She appears well-developed and well-nourished. No distress.   HENT:   Head: Normocephalic and atraumatic.   Cardiovascular: Normal rate, regular rhythm, normal heart sounds and intact distal pulses. Exam reveals no gallop and no friction rub.   No murmur heard.  Pulmonary/Chest: Breath sounds normal. No respiratory distress. She has no wheezes. She has no rales. She exhibits no tenderness.   Abdominal: Soft. Bowel sounds are normal. She exhibits no distension and no mass. There is no tenderness. There is no rebound and no guarding. No hernia.   Musculoskeletal: Normal range of motion. She exhibits no edema.   Neurological: She is alert and oriented to person, place, and time.   Skin: Skin is warm and dry. No rash noted. She is not diaphoretic. No erythema. No pallor.   Psychiatric: She has a normal mood and affect. Her behavior is normal. Judgment and thought content normal.        Assessment/Plan   Gosia was seen today for rectal bleeding.    Diagnoses and all orders for this visit:    Epigastric pain  -     Case Request; Standing  -     Case Request    Blood in stool  -     Case Request; Standing  -     Case Request    Other orders  -     Follow Anesthesia Guidelines / Standing Orders; Future  -     Obtain Informed Consent; Future  -     polyethylene glycol (GoLYTELY) 236 g solution; Starting at noon on day prior to procedure, drink 8 ounces every 30 minutes until all gone or stools are clear. May add flavor packet.        ESOPHAGOGASTRODUODENOSCOPY (N/A), COLONOSCOPY (N/A)     Diagnosis Plan   1. " Epigastric pain  Case Request    dextrose 5 % and sodium chloride 0.45 % infusion    Case Request   2. Blood in stool  Case Request    dextrose 5 % and sodium chloride 0.45 % infusion    Case Request       Anticipated Surgical Procedure:  Orders Placed This Encounter   Procedures   • Follow Anesthesia Guidelines / Standing Orders     Standing Status:   Future   • Obtain Informed Consent     Standing Status:   Future     Order Specific Question:   Informed Consent Given For     Answer:   egd and colonoscopy       The risks, benefits, and alternatives of this procedure have been discussed with the patient or the responsible party- the patient understands and agrees to proceed.

## 2020-01-28 ENCOUNTER — ANESTHESIA (OUTPATIENT)
Dept: GASTROENTEROLOGY | Facility: HOSPITAL | Age: 40
End: 2020-01-28

## 2020-01-28 ENCOUNTER — HOSPITAL ENCOUNTER (OUTPATIENT)
Facility: HOSPITAL | Age: 40
Setting detail: HOSPITAL OUTPATIENT SURGERY
Discharge: HOME | End: 2020-01-28
Attending: INTERNAL MEDICINE | Admitting: INTERNAL MEDICINE

## 2020-01-28 ENCOUNTER — ANESTHESIA EVENT (OUTPATIENT)
Dept: GASTROENTEROLOGY | Facility: HOSPITAL | Age: 40
End: 2020-01-28

## 2020-01-28 VITALS
HEART RATE: 74 BPM | RESPIRATION RATE: 19 BRPM | TEMPERATURE: 97.2 F | DIASTOLIC BLOOD PRESSURE: 76 MMHG | WEIGHT: 185.13 LBS | HEIGHT: 60 IN | OXYGEN SATURATION: 98 % | BODY MASS INDEX: 36.34 KG/M2 | SYSTOLIC BLOOD PRESSURE: 126 MMHG

## 2020-01-28 DIAGNOSIS — R10.13 EPIGASTRIC PAIN: ICD-10-CM

## 2020-01-28 DIAGNOSIS — K92.1 BLOOD IN STOOL: ICD-10-CM

## 2020-01-28 PROCEDURE — 88342 IMHCHEM/IMCYTCHM 1ST ANTB: CPT | Performed by: PATHOLOGY

## 2020-01-28 PROCEDURE — 88305 TISSUE EXAM BY PATHOLOGIST: CPT | Performed by: PATHOLOGY

## 2020-01-28 PROCEDURE — 45380 COLONOSCOPY AND BIOPSY: CPT | Performed by: INTERNAL MEDICINE

## 2020-01-28 PROCEDURE — 88342 IMHCHEM/IMCYTCHM 1ST ANTB: CPT | Performed by: INTERNAL MEDICINE

## 2020-01-28 PROCEDURE — 88305 TISSUE EXAM BY PATHOLOGIST: CPT | Performed by: INTERNAL MEDICINE

## 2020-01-28 PROCEDURE — 25010000002 PROPOFOL 10 MG/ML EMULSION: Performed by: NURSE ANESTHETIST, CERTIFIED REGISTERED

## 2020-01-28 PROCEDURE — 43239 EGD BIOPSY SINGLE/MULTIPLE: CPT | Performed by: INTERNAL MEDICINE

## 2020-01-28 RX ORDER — DEXTROSE AND SODIUM CHLORIDE 5; .45 G/100ML; G/100ML
30 INJECTION, SOLUTION INTRAVENOUS CONTINUOUS PRN
Status: DISCONTINUED | OUTPATIENT
Start: 2020-01-28 | End: 2020-01-28 | Stop reason: HOSPADM

## 2020-01-28 RX ORDER — LIDOCAINE HYDROCHLORIDE 20 MG/ML
INJECTION, SOLUTION INTRAVENOUS AS NEEDED
Status: DISCONTINUED | OUTPATIENT
Start: 2020-01-28 | End: 2020-01-28 | Stop reason: SURG

## 2020-01-28 RX ORDER — PROPOFOL 10 MG/ML
VIAL (ML) INTRAVENOUS AS NEEDED
Status: DISCONTINUED | OUTPATIENT
Start: 2020-01-28 | End: 2020-01-28 | Stop reason: SURG

## 2020-01-28 RX ADMIN — LIDOCAINE HYDROCHLORIDE 100 MG: 20 INJECTION, SOLUTION INTRAVENOUS at 15:31

## 2020-01-28 RX ADMIN — PROPOFOL 100 MG: 10 INJECTION, EMULSION INTRAVENOUS at 15:34

## 2020-01-28 RX ADMIN — PROPOFOL 30 MG: 10 INJECTION, EMULSION INTRAVENOUS at 15:36

## 2020-01-28 RX ADMIN — PROPOFOL 20 MG: 10 INJECTION, EMULSION INTRAVENOUS at 15:38

## 2020-01-28 RX ADMIN — PROPOFOL 20 MG: 10 INJECTION, EMULSION INTRAVENOUS at 15:44

## 2020-01-28 RX ADMIN — PROPOFOL 20 MG: 10 INJECTION, EMULSION INTRAVENOUS at 15:41

## 2020-01-28 RX ADMIN — PROPOFOL 30 MG: 10 INJECTION, EMULSION INTRAVENOUS at 15:35

## 2020-01-28 RX ADMIN — PROPOFOL 20 MG: 10 INJECTION, EMULSION INTRAVENOUS at 15:39

## 2020-01-28 RX ADMIN — PROPOFOL 20 MG: 10 INJECTION, EMULSION INTRAVENOUS at 15:47

## 2020-01-28 RX ADMIN — DEXTROSE AND SODIUM CHLORIDE 30 ML/HR: 5; 450 INJECTION, SOLUTION INTRAVENOUS at 14:59

## 2020-01-28 NOTE — ANESTHESIA POSTPROCEDURE EVALUATION
Patient: Gosia Brown    Procedure Summary     Date:  01/28/20 Room / Location:  [facility] ENDOSCOPY 2 / [facility] ENDOSCOPY    Anesthesia Start:  1527 Anesthesia Stop:  1548    Procedures:       ESOPHAGOGASTRODUODENOSCOPY (N/A )      COLONOSCOPY (N/A ) Diagnosis:       Epigastric pain      Blood in stool      (Epigastric pain [R10.13])      (Blood in stool [K92.1])    Surgeon:  Geoff Frank MD Provider:  Franky Tobias CRNA    Anesthesia Type:  MAC ASA Status:  2          Anesthesia Type: MAC    Vitals  No vitals data found for the desired time range.          Post Anesthesia Care and Evaluation    Patient location during evaluation: bedside  Patient participation: complete - patient participated  Level of consciousness: sleepy but conscious  Pain score: 0  Pain management: adequate  Airway patency: patent  Anesthetic complications: No anesthetic complications  PONV Status: none  Cardiovascular status: acceptable  Respiratory status: acceptable and spontaneous ventilation  Hydration status: acceptable

## 2020-01-28 NOTE — ANESTHESIA PREPROCEDURE EVALUATION
Anesthesia Evaluation     Patient summary reviewed   NPO Solid Status: > 8 hours  NPO Liquid Status: > 2 hours           Airway   Mallampati: II  TM distance: >3 FB  Neck ROM: full  No difficulty expected  Dental - normal exam     Pulmonary - normal exam   Cardiovascular - normal exam    (+) hypertension,       Neuro/Psych  (+) syncope, psychiatric history Anxiety, Depression, PTSD and Bipolar,     GI/Hepatic/Renal/Endo    (+) obesity,  GERD,      Musculoskeletal     (+) neck pain,   Abdominal    Substance History      OB/GYN          Other   arthritis,                      Anesthesia Plan    ASA 2     MAC     intravenous induction     Anesthetic plan, all risks, benefits, and alternatives have been provided, discussed and informed consent has been obtained with: patient.

## 2020-01-30 LAB
LAB AP CASE REPORT: NORMAL
LAB AP SPECIAL STAINS: NORMAL
PATH REPORT.FINAL DX SPEC: NORMAL
PATH REPORT.GROSS SPEC: NORMAL

## 2020-02-25 ENCOUNTER — OFFICE VISIT (OUTPATIENT)
Dept: GASTROENTEROLOGY | Facility: CLINIC | Age: 40
End: 2020-02-25

## 2020-02-25 VITALS
BODY MASS INDEX: 37.38 KG/M2 | OXYGEN SATURATION: 97 % | DIASTOLIC BLOOD PRESSURE: 87 MMHG | SYSTOLIC BLOOD PRESSURE: 126 MMHG | WEIGHT: 190.4 LBS | HEIGHT: 60 IN | HEART RATE: 69 BPM

## 2020-02-25 DIAGNOSIS — B96.81 HELICOBACTER PYLORI GASTRITIS: ICD-10-CM

## 2020-02-25 DIAGNOSIS — R10.84 GENERALIZED ABDOMINAL PAIN: Primary | ICD-10-CM

## 2020-02-25 DIAGNOSIS — K29.70 HELICOBACTER PYLORI GASTRITIS: ICD-10-CM

## 2020-02-25 PROCEDURE — 99214 OFFICE O/P EST MOD 30 MIN: CPT | Performed by: INTERNAL MEDICINE

## 2020-02-25 RX ORDER — CLARITHROMYCIN 500 MG/1
500 TABLET, COATED ORAL 2 TIMES DAILY
Qty: 28 TABLET | Refills: 0 | Status: SHIPPED | OUTPATIENT
Start: 2020-02-25 | End: 2020-05-22

## 2020-02-25 RX ORDER — OMEPRAZOLE 20 MG/1
20 CAPSULE, DELAYED RELEASE ORAL 2 TIMES DAILY
Qty: 60 CAPSULE | Refills: 0 | Status: SHIPPED | OUTPATIENT
Start: 2020-02-25 | End: 2020-07-01

## 2020-02-25 RX ORDER — AMOXICILLIN 500 MG/1
1000 CAPSULE ORAL 2 TIMES DAILY
Qty: 56 CAPSULE | Refills: 0 | Status: SHIPPED | OUTPATIENT
Start: 2020-02-25 | End: 2020-05-22

## 2020-02-25 NOTE — PROGRESS NOTES
[facility]      Chief Complaint:   Chief Complaint   Patient presents with   • Abdominal Pain   • Black or Bloody Stool       Subjective     HPI:   Patient with recent evaluation for blood in stool and abdominal pain.  Colonoscopy appeared normal.  Patient had H. pylori positive gastritis on endoscopy.  Patient has had this previously and was treated although the treatment was from another practitioner and I am unsure what they treated her with.  Patient stated there was an antibiotic and a single pill treated this.    Plan; we will start patient on Biaxin and amoxicillin also Prilosec twice a day.  Patient follow-up in 3 months.  Patient is on Tegretol because of the possible reaction between Biaxin and Tegretol patient will have primary doctor switch her Tegretol to another medication.  Patient understands not to start the therapy until the Tegretol has been switched    Past Medical History:   Past Medical History:   Diagnosis Date   • Anemia    • Anxiety    • Arthritis    • Back problem    • Bipolar depression (CMS/[facility])    • Depression    • Diabetes mellitus (CMS/[facility]) 2019    On a strict diet so i dont become one. Sugar is staying elev   • GERD (gastroesophageal reflux disease)    • Heart problem    • Heartburn    • PTSD (post-traumatic stress disorder)        Past Surgical History:  Past Surgical History:   Procedure Laterality Date   • COLONOSCOPY N/A 2/8/2018    Procedure: COLONOSCOPY;  Surgeon: Geoff Frank MD;  Location: [facility] ENDOSCOPY;  Service:    • COLONOSCOPY N/A 1/28/2020    Procedure: COLONOSCOPY;  Surgeon: Geoff Frank MD;  Location: [facility] ENDOSCOPY;  Service: Gastroenterology   • ENDOSCOPY N/A 2/8/2018    Procedure: ESOPHAGOGASTRODUODENOSCOPY;  Surgeon: Geoff Frank MD;  Location: [facility] ENDOSCOPY;  Service:    • ENDOSCOPY N/A 1/28/2020    Procedure: ESOPHAGOGASTRODUODENOSCOPY;  Surgeon: Geoff Frank MD;  Location: [facility] ENDOSCOPY;  Service: Gastroenterology    • EYE SURGERY Right    • TUBAL ABDOMINAL LIGATION         Family History:  Family History   Problem Relation Age of Onset   • Arthritis Mother    • Hypertension Mother    • Ovarian cancer Mother    • Breast cancer Maternal Aunt    • Colon cancer Maternal Uncle    • Lung cancer Maternal Uncle    • Heart disease Maternal Grandfather    • Stroke Maternal Grandfather    • Cancer Maternal Grandfather    • Lung cancer Maternal Grandfather    • Lung cancer Maternal Aunt    • Cancer Maternal Grandmother        Social History:   reports that she has never smoked. She has never used smokeless tobacco. She reports that she does not drink alcohol or use drugs.    Medications:   Prior to Admission medications    Medication Sig Start Date End Date Taking? Authorizing Provider   amoxicillin (AMOXIL) 500 MG capsule Take 2 capsules by mouth 2 (Two) Times a Day. 2/25/20   Geoff Frank MD   carBAMazepine XR (TEGretol  XR) 400 MG 12 hr tablet Take 400 mg by mouth Daily.    Saurabh Perkins MD   clarithromycin (BIAXIN) 500 MG tablet Take 1 tablet by mouth 2 (Two) Times a Day. 2/25/20   Geoff Frank MD   ferrous sulfate (IRON SUPPLEMENT) 325 (65 FE) MG tablet Take 1 tablet by mouth Daily With Breakfast. 11/14/19   Devon Kramer MD   FLUoxetine (PROzac) 20 MG tablet Take 20 mg by mouth Daily. 7/19/18   Saurabh Perkins MD   lisinopril (PRINIVIL,ZESTRIL) 10 MG tablet Take 1 tablet by mouth Daily. 11/12/19   Devon Kramer MD   omeprazole (priLOSEC) 20 MG capsule Take 1 capsule by mouth 2 (Two) Times a Day. 2/25/20   Geoff Frank MD   propranolol (INDERAL) 20 MG tablet Take 20 mg by mouth 3 (Three) Times a Day As Needed. 8/28/19   Saurabh Perkins MD   senna-docusate sodium (SENOKOT-S) 8.6-50 MG tablet Take 1 tablet by mouth Daily. 11/14/19   Devon Kramer MD       Allergies:  Patient has no known allergies.    ROS:    Review of Systems   Constitutional: Negative for activity change,  "appetite change, chills, diaphoresis, fatigue, fever and unexpected weight change.   HENT: Negative for sore throat and trouble swallowing.    Respiratory: Negative for shortness of breath.    Gastrointestinal: Positive for abdominal pain. Negative for abdominal distention, anal bleeding, blood in stool, constipation, diarrhea, nausea, rectal pain and vomiting.   Endocrine: Negative for polydipsia, polyphagia and polyuria.   Genitourinary: Negative for difficulty urinating.   Musculoskeletal: Negative for arthralgias.   Skin: Negative for pallor.   Allergic/Immunologic: Negative for food allergies.   Neurological: Negative for weakness and light-headedness.   Psychiatric/Behavioral: Negative for behavioral problems.     Objective     Blood pressure 126/87, pulse 69, height 152.4 cm (60\"), weight 86.4 kg (190 lb 6.4 oz), SpO2 97 %, not currently breastfeeding.    Physical Exam   Constitutional: She is oriented to person, place, and time. She appears well-developed and well-nourished. No distress.   HENT:   Head: Normocephalic and atraumatic.   Cardiovascular: Normal rate, regular rhythm, normal heart sounds and intact distal pulses. Exam reveals no gallop and no friction rub.   No murmur heard.  Pulmonary/Chest: Breath sounds normal. No respiratory distress. She has no wheezes. She has no rales. She exhibits no tenderness.   Abdominal: Soft. Bowel sounds are normal. She exhibits no distension and no mass. There is no tenderness. There is no rebound and no guarding. No hernia.   Musculoskeletal: Normal range of motion. She exhibits no edema.   Neurological: She is alert and oriented to person, place, and time.   Skin: Skin is warm and dry. No rash noted. She is not diaphoretic. No erythema. No pallor.   Psychiatric: She has a normal mood and affect. Her behavior is normal. Judgment and thought content normal.        Assessment/Plan   Gosia was seen today for abdominal pain and black or bloody stool.    Diagnoses " and all orders for this visit:    Generalized abdominal pain    Helicobacter pylori gastritis    Other orders  -     amoxicillin (AMOXIL) 500 MG capsule; Take 2 capsules by mouth 2 (Two) Times a Day.  -     clarithromycin (BIAXIN) 500 MG tablet; Take 1 tablet by mouth 2 (Two) Times a Day.  -     omeprazole (priLOSEC) 20 MG capsule; Take 1 capsule by mouth 2 (Two) Times a Day.        * Surgery not found *     Diagnosis Plan   1. Generalized abdominal pain     2. Helicobacter pylori gastritis         Anticipated Surgical Procedure:  No orders of the defined types were placed in this encounter.      The risks, benefits, and alternatives of this procedure have been discussed with the patient or the responsible party- the patient understands and agrees to proceed.

## 2020-02-25 NOTE — PATIENT INSTRUCTIONS

## 2020-05-22 ENCOUNTER — OFFICE VISIT (OUTPATIENT)
Dept: FAMILY MEDICINE CLINIC | Facility: CLINIC | Age: 40
End: 2020-05-22

## 2020-05-22 VITALS — WEIGHT: 194 LBS | BODY MASS INDEX: 38.09 KG/M2 | HEIGHT: 60 IN

## 2020-05-22 DIAGNOSIS — G89.29 CHRONIC NECK PAIN: Primary | ICD-10-CM

## 2020-05-22 DIAGNOSIS — R20.2 PARESTHESIAS WITH SUBJECTIVE WEAKNESS: ICD-10-CM

## 2020-05-22 DIAGNOSIS — R53.1 PARESTHESIAS WITH SUBJECTIVE WEAKNESS: ICD-10-CM

## 2020-05-22 DIAGNOSIS — M54.2 CHRONIC NECK PAIN: Primary | ICD-10-CM

## 2020-05-22 DIAGNOSIS — G44.211 INTRACTABLE EPISODIC TENSION-TYPE HEADACHE: ICD-10-CM

## 2020-05-22 PROCEDURE — 99213 OFFICE O/P EST LOW 20 MIN: CPT | Performed by: STUDENT IN AN ORGANIZED HEALTH CARE EDUCATION/TRAINING PROGRAM

## 2020-05-22 RX ORDER — QUETIAPINE FUMARATE 100 MG/1
TABLET, FILM COATED ORAL
COMMUNITY
Start: 2020-05-02 | End: 2020-09-22 | Stop reason: SDUPTHER

## 2020-05-22 RX ORDER — CLONAZEPAM 0.5 MG/1
0.5 TABLET ORAL DAILY PRN
COMMUNITY
Start: 2020-05-13 | End: 2021-08-06

## 2020-05-22 RX ORDER — ASENAPINE MALEATE 2.5 MG/1
TABLET SUBLINGUAL
COMMUNITY
Start: 2020-05-06 | End: 2021-08-06

## 2020-05-22 RX ORDER — BUSPIRONE HYDROCHLORIDE 15 MG/1
15 TABLET ORAL 4 TIMES DAILY
COMMUNITY
Start: 2020-05-02 | End: 2022-09-12

## 2020-05-22 RX ORDER — METHOCARBAMOL 750 MG/1
750 TABLET, FILM COATED ORAL 4 TIMES DAILY PRN
Qty: 120 TABLET | Refills: 0 | Status: SHIPPED | OUTPATIENT
Start: 2020-05-22 | End: 2020-06-26 | Stop reason: SDUPTHER

## 2020-05-22 RX ORDER — TRAZODONE HYDROCHLORIDE 150 MG/1
TABLET ORAL
COMMUNITY
Start: 2020-05-08 | End: 2021-08-06

## 2020-05-22 RX ORDER — CLINDAMYCIN HYDROCHLORIDE 300 MG/1
300 CAPSULE ORAL 2 TIMES DAILY
COMMUNITY
Start: 2020-05-19 | End: 2020-07-01

## 2020-05-22 RX ORDER — FLUOXETINE HYDROCHLORIDE 20 MG/1
40 CAPSULE ORAL DAILY
COMMUNITY
Start: 2020-05-02 | End: 2020-09-22 | Stop reason: ALTCHOICE

## 2020-05-22 NOTE — PROGRESS NOTES
Family Medicine Residency  Devon Kramer MD    Subjective:     TELEVISIT  You have chosen to receive care through a telephone visit. Do you consent to use a telephone visit for your medical care today? Yes    Gosia Brown is a 39 y.o. female who presents for follow up neck pain and headaches.    Patient with chronic neck pain, however currently experiencing an exacerbation to chronic symptoms. XR cervical spin 11/2019 showed multilevel degenerative changes as read by Dr. Lopez. She currenlty complains of muslce spasms on right paraspinal neck musculature. Also of associated tension-type headache 5/10 -10/10, vice-like pressure, constant with intermittent exacerbations. No aggravating or alleviating factors known. Tried tylenol and nsaids with no relief. Tried flexaril given at  with no relief. Associated with right arm paresthesias.    No other complaints.     The following portions of the patient's history were reviewed and updated as appropriate: allergies, current medications, past family history, past medical history, past social history, past surgical history and problem list.    Past Medical Hx:  Past Medical History:   Diagnosis Date   • Anemia    • Anxiety    • Arthritis    • Back problem    • Bipolar depression (CMS/[facility])    • Depression    • Diabetes mellitus (CMS/[facility]) 2019    On a strict diet so i dont become one. Sugar is staying elev   • GERD (gastroesophageal reflux disease)    • Heart problem    • Heartburn    • PTSD (post-traumatic stress disorder)        Past Surgical Hx:  Past Surgical History:   Procedure Laterality Date   • COLONOSCOPY N/A 2/8/2018    Procedure: COLONOSCOPY;  Surgeon: Geoff Frank MD;  Location: [facility] ENDOSCOPY;  Service:    • COLONOSCOPY N/A 1/28/2020    Procedure: COLONOSCOPY;  Surgeon: Geoff Frank MD;  Location: [facility] ENDOSCOPY;  Service: Gastroenterology   • ENDOSCOPY N/A 2/8/2018    Procedure: ESOPHAGOGASTRODUODENOSCOPY;  Surgeon: Geoff JONES  MD Zac;  Location: [facility] ENDOSCOPY;  Service:    • ENDOSCOPY N/A 1/28/2020    Procedure: ESOPHAGOGASTRODUODENOSCOPY;  Surgeon: Geoff Frank MD;  Location: [facility] ENDOSCOPY;  Service: Gastroenterology   • EYE SURGERY Right    • TUBAL ABDOMINAL LIGATION         Current Meds:    Current Outpatient Medications:   •  busPIRone (BUSPAR) 15 MG tablet, Take 15 mg by mouth 4 (Four) Times a Day., Disp: , Rfl:   •  carBAMazepine XR (TEGretol  XR) 400 MG 12 hr tablet, Take 300 mg by mouth 2 (Two) Times a Day., Disp: , Rfl:   •  clindamycin (CLEOCIN) 300 MG capsule, Take 300 mg by mouth 2 (Two) Times a Day., Disp: , Rfl:   •  clonazePAM (KlonoPIN) 0.5 MG tablet, Take 0.5 mg by mouth Daily As Needed., Disp: , Rfl:   •  ferrous sulfate (IRON SUPPLEMENT) 325 (65 FE) MG tablet, Take 1 tablet by mouth Daily With Breakfast., Disp: 30 tablet, Rfl: 4  •  FLUoxetine (PROzac) 20 MG capsule, Take 40 mg by mouth Daily., Disp: , Rfl:   •  lisinopril (PRINIVIL,ZESTRIL) 10 MG tablet, Take 1 tablet by mouth Daily., Disp: 30 tablet, Rfl: 2  •  omeprazole (priLOSEC) 20 MG capsule, Take 1 capsule by mouth 2 (Two) Times a Day., Disp: 60 capsule, Rfl: 0  •  propranolol (INDERAL) 20 MG tablet, Take 20 mg by mouth 3 (Three) Times a Day As Needed., Disp: , Rfl: 0  •  QUEtiapine (SEROquel) 100 MG tablet, TAKE 1 & 1 2 (ONE & ONE HALF) TABLETS BY MOUTH AT BEDTIME, Disp: , Rfl:   •  SAPHRIS 2.5 MG sublingual tablet, PLACE 1 UNDER THE TONGUE TWICE DAILY, Disp: , Rfl:   •  senna-docusate sodium (SENOKOT-S) 8.6-50 MG tablet, Take 1 tablet by mouth Daily., Disp: 30 tablet, Rfl: 4  •  traZODone (DESYREL) 150 MG tablet, TAKE 1 TO 1&1 2 TO 2 TABLETS BY MOUTH AT BEDTIME AS DIRECTED AS NEEDED FOR SLEEP, Disp: , Rfl:   •  methocarbamol (ROBAXIN) 750 MG tablet, Take 1 tablet by mouth 4 (Four) Times a Day As Needed for Muscle Spasms., Disp: 120 tablet, Rfl: 0    Allergies:  No Known Allergies    Family Hx:  Family History   Problem Relation Age of Onset   •  "Arthritis Mother    • Hypertension Mother    • Ovarian cancer Mother    • Breast cancer Maternal Aunt    • Colon cancer Maternal Uncle    • Lung cancer Maternal Uncle    • Heart disease Maternal Grandfather    • Stroke Maternal Grandfather    • Cancer Maternal Grandfather    • Lung cancer Maternal Grandfather    • Lung cancer Maternal Aunt    • Cancer Maternal Grandmother         Social History:  Social History     Socioeconomic History   • Marital status: Single     Spouse name: Not on file   • Number of children: Not on file   • Years of education: Not on file   • Highest education level: Not on file   Tobacco Use   • Smoking status: Never Smoker   • Smokeless tobacco: Never Used   Substance and Sexual Activity   • Alcohol use: No   • Drug use: No   • Sexual activity: Defer       Review of Systems  Review of Systems   Constitutional: Negative for chills and fatigue.   HENT: Negative for congestion and sore throat.    Eyes: Negative for pain and visual disturbance.   Respiratory: Negative for cough and shortness of breath.    Cardiovascular: Negative for chest pain and leg swelling.   Gastrointestinal: Negative for abdominal pain and nausea.   Genitourinary: Negative for dysuria and flank pain.   Musculoskeletal: Positive for neck pain. Negative for back pain and gait problem.   Skin: Negative for pallor and rash.   Neurological: Positive for headaches. Negative for dizziness and syncope.   Psychiatric/Behavioral: Negative for confusion and dysphoric mood.       Objective:     Ht 152.4 cm (60\")   Wt 88 kg (194 lb)   BMI 37.89 kg/m²   Physical Exam     Assessment/Plan:     Gosia was seen today for neck pain.    Diagnoses and all orders for this visit:    Chronic neck pain  -     Ambulatory Referral to Physical Therapy Evaluate and treat (chronic neck pain causing right shoulder pain and headache)  -     MRI Cervical Spine Without Contrast; Future    Intractable episodic tension-type headache  -     Ambulatory " Referral to Physical Therapy Evaluate and treat (chronic neck pain causing right shoulder pain and headache)    Paresthesias with subjective weakness  -     MRI Cervical Spine Without Contrast; Future    Other orders  -     methocarbamol (ROBAXIN) 750 MG tablet; Take 1 tablet by mouth 4 (Four) Times a Day As Needed for Muscle Spasms.      39 yr old woman with chronic neck pain and headaches presents for exacerbation of these symptoms. As they are worsening and now associated with right arm paresthesias and subjective weakness will order MRI cervical spine to evaluate nerve compression. If MRI is concerning will refer to ortho for evaluation. In the mean time will send to PT for evaluation and treatment. She was seen by them in the past and if agreeable to restart treatment for neck pain and associated tension-type headaches. Advised to use extra strength tylenol q6h to not exceed 3 g daily. As flexeril did not seem to help patient will trial robaxin.     · Rx changes: robaxin  · Patient Education: PT stretches and exercises  · Compliance at present is estimated to be fair.   · Efforts to improve compliance (if necessary) will be directed at increased exercise.     Follow-up:     No follow-ups on file.    Preventative:  Health Maintenance   Topic Date Due   • ANNUAL PHYSICAL  11/15/1982   • HEPATITIS C SCREENING  12/06/2017   • PAP SMEAR  12/06/2017   • INFLUENZA VACCINE  08/01/2020   • COLONOSCOPY  01/28/2025   • TDAP/TD VACCINES (2 - Td) 10/14/2029     Alcohol use:  reports that she does not drink alcohol.  Nicotine status  reports that she has never smoked. She has never used smokeless tobacco.    Goals    None         RISK SCORE: 3      Devon Kramer M.D. PGY2  [facility] Family Medicine Residency  [address] [place] [address]  Office: [phone]    This document has been electronically signed by Devon Kramer MD on May 28, 2020 20:28      TIME: 10 minutes

## 2020-05-29 NOTE — PROGRESS NOTES
I have spoken with the patient.  I have reviewed the notes, assessments, and/or procedures performed by Dr. Kapil Kramer, I concur with his  documentation and assessment and plan for Gosia Brown.          This document has been electronically signed by Booker Garcia MD on May 29, 2020 09:52

## 2020-06-18 ENCOUNTER — APPOINTMENT (OUTPATIENT)
Dept: GENERAL RADIOLOGY | Facility: HOSPITAL | Age: 40
End: 2020-06-18

## 2020-06-18 ENCOUNTER — HOSPITAL ENCOUNTER (EMERGENCY)
Facility: HOSPITAL | Age: 40
Discharge: HOME | End: 2020-06-18
Attending: EMERGENCY MEDICINE | Admitting: EMERGENCY MEDICINE

## 2020-06-18 ENCOUNTER — APPOINTMENT (OUTPATIENT)
Dept: CT IMAGING | Facility: HOSPITAL | Age: 40
End: 2020-06-18

## 2020-06-18 VITALS
HEART RATE: 67 BPM | WEIGHT: 195 LBS | SYSTOLIC BLOOD PRESSURE: 115 MMHG | TEMPERATURE: 98.3 F | BODY MASS INDEX: 38.28 KG/M2 | HEIGHT: 60 IN | OXYGEN SATURATION: 96 % | DIASTOLIC BLOOD PRESSURE: 67 MMHG | RESPIRATION RATE: 18 BRPM

## 2020-06-18 DIAGNOSIS — R55 SYNCOPE AND COLLAPSE: ICD-10-CM

## 2020-06-18 DIAGNOSIS — D64.9 ANEMIA, UNSPECIFIED TYPE: ICD-10-CM

## 2020-06-18 DIAGNOSIS — G44.209 ACUTE NON INTRACTABLE TENSION-TYPE HEADACHE: Primary | ICD-10-CM

## 2020-06-18 DIAGNOSIS — J01.20 ACUTE ETHMOIDAL SINUSITIS, RECURRENCE NOT SPECIFIED: ICD-10-CM

## 2020-06-18 LAB
ALBUMIN SERPL-MCNC: 4.4 G/DL (ref 3.5–5.2)
ALBUMIN/GLOB SERPL: 1.5 G/DL
ALP SERPL-CCNC: 62 U/L (ref 39–117)
ALT SERPL W P-5'-P-CCNC: 9 U/L (ref 1–33)
ANION GAP SERPL CALCULATED.3IONS-SCNC: 9 MMOL/L (ref 5–15)
AST SERPL-CCNC: 14 U/L (ref 1–32)
BASOPHILS # BLD AUTO: 0.03 10*3/MM3 (ref 0–0.2)
BASOPHILS NFR BLD AUTO: 0.4 % (ref 0–1.5)
BILIRUB SERPL-MCNC: <0.2 MG/DL (ref 0.2–1.2)
BUN BLD-MCNC: 11 MG/DL (ref 6–20)
BUN/CREAT SERPL: 15.1 (ref 7–25)
CALCIUM SPEC-SCNC: 8.5 MG/DL (ref 8.6–10.5)
CARBAMAZEPINE SERPL-MCNC: 5.8 MCG/ML (ref 4–12)
CHLORIDE SERPL-SCNC: 102 MMOL/L (ref 98–107)
CO2 SERPL-SCNC: 24 MMOL/L (ref 22–29)
CREAT BLD-MCNC: 0.73 MG/DL (ref 0.57–1)
D-DIMER, QUANTITATIVE (MAD,POW, STR): 376 NG/ML (FEU) (ref 0–470)
DEPRECATED RDW RBC AUTO: 39.5 FL (ref 37–54)
EOSINOPHIL # BLD AUTO: 0.08 10*3/MM3 (ref 0–0.4)
EOSINOPHIL NFR BLD AUTO: 1.1 % (ref 0.3–6.2)
ERYTHROCYTE [DISTWIDTH] IN BLOOD BY AUTOMATED COUNT: 14.9 % (ref 12.3–15.4)
GFR SERPL CREATININE-BSD FRML MDRD: 88 ML/MIN/1.73
GLOBULIN UR ELPH-MCNC: 2.9 GM/DL
GLUCOSE BLD-MCNC: 110 MG/DL (ref 65–99)
HCG SERPL QL: NEGATIVE
HCT VFR BLD AUTO: 31.1 % (ref 34–46.6)
HGB BLD-MCNC: 9.6 G/DL (ref 12–15.9)
HOLD SPECIMEN: NORMAL
IMM GRANULOCYTES # BLD AUTO: 0.02 10*3/MM3 (ref 0–0.05)
IMM GRANULOCYTES NFR BLD AUTO: 0.3 % (ref 0–0.5)
LYMPHOCYTES # BLD AUTO: 2.09 10*3/MM3 (ref 0.7–3.1)
LYMPHOCYTES NFR BLD AUTO: 29.1 % (ref 19.6–45.3)
MAGNESIUM SERPL-MCNC: 2 MG/DL (ref 1.6–2.6)
MCH RBC QN AUTO: 22.8 PG (ref 26.6–33)
MCHC RBC AUTO-ENTMCNC: 30.9 G/DL (ref 31.5–35.7)
MCV RBC AUTO: 73.9 FL (ref 79–97)
MONOCYTES # BLD AUTO: 0.55 10*3/MM3 (ref 0.1–0.9)
MONOCYTES NFR BLD AUTO: 7.7 % (ref 5–12)
NEUTROPHILS # BLD AUTO: 4.41 10*3/MM3 (ref 1.7–7)
NEUTROPHILS NFR BLD AUTO: 61.4 % (ref 42.7–76)
NRBC BLD AUTO-RTO: 0 /100 WBC (ref 0–0.2)
PLATELET # BLD AUTO: 332 10*3/MM3 (ref 140–450)
PMV BLD AUTO: 8.8 FL (ref 6–12)
POTASSIUM BLD-SCNC: 3.7 MMOL/L (ref 3.5–5.2)
PROT SERPL-MCNC: 7.3 G/DL (ref 6–8.5)
RBC # BLD AUTO: 4.21 10*6/MM3 (ref 3.77–5.28)
SODIUM BLD-SCNC: 135 MMOL/L (ref 136–145)
TROPONIN T SERPL-MCNC: <0.01 NG/ML (ref 0–0.03)
WBC NRBC COR # BLD: 7.18 10*3/MM3 (ref 3.4–10.8)
WHOLE BLOOD HOLD SPECIMEN: NORMAL
WHOLE BLOOD HOLD SPECIMEN: NORMAL

## 2020-06-18 PROCEDURE — 99285 EMERGENCY DEPT VISIT HI MDM: CPT

## 2020-06-18 PROCEDURE — 25010000002 KETOROLAC TROMETHAMINE PER 15 MG: Performed by: EMERGENCY MEDICINE

## 2020-06-18 PROCEDURE — 96375 TX/PRO/DX INJ NEW DRUG ADDON: CPT

## 2020-06-18 PROCEDURE — 85379 FIBRIN DEGRADATION QUANT: CPT | Performed by: EMERGENCY MEDICINE

## 2020-06-18 PROCEDURE — 84703 CHORIONIC GONADOTROPIN ASSAY: CPT | Performed by: EMERGENCY MEDICINE

## 2020-06-18 PROCEDURE — 80053 COMPREHEN METABOLIC PANEL: CPT | Performed by: EMERGENCY MEDICINE

## 2020-06-18 PROCEDURE — 96374 THER/PROPH/DIAG INJ IV PUSH: CPT

## 2020-06-18 PROCEDURE — 85025 COMPLETE CBC W/AUTO DIFF WBC: CPT | Performed by: EMERGENCY MEDICINE

## 2020-06-18 PROCEDURE — 93005 ELECTROCARDIOGRAM TRACING: CPT | Performed by: EMERGENCY MEDICINE

## 2020-06-18 PROCEDURE — 80156 ASSAY CARBAMAZEPINE TOTAL: CPT | Performed by: EMERGENCY MEDICINE

## 2020-06-18 PROCEDURE — 93010 ELECTROCARDIOGRAM REPORT: CPT | Performed by: INTERNAL MEDICINE

## 2020-06-18 PROCEDURE — 84484 ASSAY OF TROPONIN QUANT: CPT | Performed by: EMERGENCY MEDICINE

## 2020-06-18 PROCEDURE — 83735 ASSAY OF MAGNESIUM: CPT | Performed by: EMERGENCY MEDICINE

## 2020-06-18 PROCEDURE — 70450 CT HEAD/BRAIN W/O DYE: CPT

## 2020-06-18 PROCEDURE — 25010000002 ONDANSETRON PER 1 MG: Performed by: EMERGENCY MEDICINE

## 2020-06-18 PROCEDURE — 71045 X-RAY EXAM CHEST 1 VIEW: CPT

## 2020-06-18 RX ORDER — KETOROLAC TROMETHAMINE 30 MG/ML
30 INJECTION, SOLUTION INTRAMUSCULAR; INTRAVENOUS ONCE
Status: COMPLETED | OUTPATIENT
Start: 2020-06-18 | End: 2020-06-18

## 2020-06-18 RX ORDER — SODIUM CHLORIDE 9 MG/ML
125 INJECTION, SOLUTION INTRAVENOUS CONTINUOUS
Status: DISCONTINUED | OUTPATIENT
Start: 2020-06-18 | End: 2020-06-18 | Stop reason: HOSPADM

## 2020-06-18 RX ORDER — CEFPROZIL 500 MG/1
500 TABLET, FILM COATED ORAL 2 TIMES DAILY
Qty: 20 TABLET | Refills: 0 | Status: SHIPPED | OUTPATIENT
Start: 2020-06-18 | End: 2020-06-28

## 2020-06-18 RX ORDER — FLUTICASONE PROPIONATE 50 MCG
2 SPRAY, SUSPENSION (ML) NASAL DAILY
Qty: 1 BOTTLE | Refills: 0 | OUTPATIENT
Start: 2020-06-18 | End: 2020-08-05

## 2020-06-18 RX ORDER — SODIUM CHLORIDE 0.9 % (FLUSH) 0.9 %
10 SYRINGE (ML) INJECTION AS NEEDED
Status: DISCONTINUED | OUTPATIENT
Start: 2020-06-18 | End: 2020-06-18 | Stop reason: HOSPADM

## 2020-06-18 RX ORDER — ONDANSETRON 2 MG/ML
4 INJECTION INTRAMUSCULAR; INTRAVENOUS ONCE
Status: COMPLETED | OUTPATIENT
Start: 2020-06-18 | End: 2020-06-18

## 2020-06-18 RX ORDER — NAPROXEN SODIUM 550 MG/1
550 TABLET ORAL 2 TIMES DAILY PRN
Qty: 20 TABLET | Refills: 0 | Status: SHIPPED | OUTPATIENT
Start: 2020-06-18 | End: 2020-07-01

## 2020-06-18 RX ORDER — ACETAMINOPHEN 500 MG
1000 TABLET ORAL ONCE
Status: COMPLETED | OUTPATIENT
Start: 2020-06-18 | End: 2020-06-18

## 2020-06-18 RX ADMIN — KETOROLAC TROMETHAMINE 30 MG: 30 INJECTION, SOLUTION INTRAMUSCULAR at 11:28

## 2020-06-18 RX ADMIN — ACETAMINOPHEN 1000 MG: 500 TABLET ORAL at 09:16

## 2020-06-18 RX ADMIN — ONDANSETRON 4 MG: 2 INJECTION INTRAMUSCULAR; INTRAVENOUS at 09:16

## 2020-06-18 RX ADMIN — SODIUM CHLORIDE 125 ML/HR: 9 INJECTION, SOLUTION INTRAVENOUS at 09:16

## 2020-06-18 NOTE — ED NOTES
All specimens collected and labeled at bedside with patient present. Patient advised of approximate results to return time.       Raquel Graham RN  06/18/20 [id number]

## 2020-06-18 NOTE — ED NOTES
"Patient presents to ED with chief complaint of syncopal episodes. Patient states that she has a \"bad neck and a bad heart\" and she has been passing out when she lays down. Patient states if she puts her chin to her chest she can't breathe. Patient placed on cardiac monitoring.      Raquel Graham RN  06/18/20 0829    "

## 2020-06-18 NOTE — ED PROVIDER NOTES
"Subjective   38yo female pmh significant anxiety/bipolar disorder/htn presents ED c/o multiple \"syncopal\" episodes over past 2 days.  Pt reports (2) episodes yesterday characterized by tunnel vision, then subsequently unresponsive while eyes were open, patient was aware of her surroundings and could hear family members speaking to her, without loss of postural tone.  Pt additionally reports ambulating in home today when awoke on the floor.  ROS (+) headache/chronic neck pain.  Denies fever/trauma/chest pain/soa/abd pain/vomiting/diarrhea/incontinence/ tongue biting/hx seizure/palpitations.      History provided by:  Patient  Syncope   Episode history:  Multiple  Chronicity:  Recurrent  Witnessed: yes    Associated symptoms: headaches        Review of Systems   Constitutional: Negative.    Respiratory: Negative.    Cardiovascular: Positive for syncope.   Gastrointestinal: Negative.    Genitourinary: Negative.    Musculoskeletal: Positive for neck pain.   Skin: Negative.    Neurological: Positive for syncope and headaches. Negative for facial asymmetry and numbness.   All other systems reviewed and are negative.      Past Medical History:   Diagnosis Date   • Anemia    • Anxiety    • Arthritis    • Back problem    • Bipolar depression (CMS/[facility])    • Depression    • Diabetes mellitus (CMS/H[facility]) 2019    On a strict diet so i dont become one. Sugar is staying elev   • GERD (gastroesophageal reflux disease)    • Heart problem    • Heartburn    • PTSD (post-traumatic stress disorder)        No Known Allergies    Past Surgical History:   Procedure Laterality Date   • COLONOSCOPY N/A 2/8/2018    Procedure: COLONOSCOPY;  Surgeon: Geoff Frank MD;  Location: [facility] ENDOSCOPY;  Service:    • COLONOSCOPY N/A 1/28/2020    Procedure: COLONOSCOPY;  Surgeon: Geoff Frank MD;  Location: [facility] ENDOSCOPY;  Service: Gastroenterology   • ENDOSCOPY N/A 2/8/2018    Procedure: ESOPHAGOGASTRODUODENOSCOPY;  Surgeon: Geoff Frank MD; "  Location: [facility] ENDOSCOPY;  Service:    • ENDOSCOPY N/A 1/28/2020    Procedure: ESOPHAGOGASTRODUODENOSCOPY;  Surgeon: Geoff Frank MD;  Location: [facility] ENDOSCOPY;  Service: Gastroenterology   • EYE SURGERY Right    • TUBAL ABDOMINAL LIGATION         Family History   Problem Relation Age of Onset   • Arthritis Mother    • Hypertension Mother    • Ovarian cancer Mother    • Breast cancer Maternal Aunt    • Colon cancer Maternal Uncle    • Lung cancer Maternal Uncle    • Heart disease Maternal Grandfather    • Stroke Maternal Grandfather    • Cancer Maternal Grandfather    • Lung cancer Maternal Grandfather    • Lung cancer Maternal Aunt    • Cancer Maternal Grandmother        Social History     Socioeconomic History   • Marital status: Single     Spouse name: Not on file   • Number of children: Not on file   • Years of education: Not on file   • Highest education level: Not on file   Tobacco Use   • Smoking status: Never Smoker   • Smokeless tobacco: Never Used   Substance and Sexual Activity   • Alcohol use: No   • Drug use: No   • Sexual activity: Defer           Objective   Physical Exam   Constitutional: She is oriented to person, place, and time. She appears well-developed and well-nourished.   HENT:   Head: Normocephalic and atraumatic.   Right Ear: External ear normal.   Left Ear: External ear normal.   Nose: Nose normal.   Mouth/Throat: Oropharynx is clear and moist.   Eyes: Pupils are equal, round, and reactive to light. EOM are normal.   Neck: Normal range of motion. Neck supple. No JVD present. No tracheal deviation present.   Cardiovascular: Normal rate, regular rhythm, normal heart sounds and intact distal pulses. Exam reveals no gallop and no friction rub.   No murmur heard.  Pulmonary/Chest: Effort normal and breath sounds normal. She has no wheezes. She has no rales.   Abdominal: Soft. Bowel sounds are normal. She exhibits no distension and no mass. There is no tenderness. There is no rebound  and no guarding.   Musculoskeletal: Normal range of motion.   Lymphadenopathy:     She has no cervical adenopathy.   Neurological: She is alert and oriented to person, place, and time. She has normal strength. No cranial nerve deficit or sensory deficit. Coordination normal. GCS eye subscore is 4. GCS verbal subscore is 5. GCS motor subscore is 6.   Skin: Skin is warm.   Nursing note and vitals reviewed.      Procedures           ED Course  ED Course as of Jun 18 1125   Thu Jun 18, 2020   0843 Ekg: NSR/rate 80/normal axis/prwp    [SD]      ED Course User Index  [SD] Sourav Kerr MD      Labs Reviewed   COMPREHENSIVE METABOLIC PANEL - Abnormal; Notable for the following components:       Result Value    Glucose 110 (*)     Sodium 135 (*)     Calcium 8.5 (*)     Total Bilirubin <0.2 (*)     All other components within normal limits    Narrative:     GFR Normal >60  Chronic Kidney Disease <60  Kidney Failure <15     CBC WITH AUTO DIFFERENTIAL - Abnormal; Notable for the following components:    Hemoglobin 9.6 (*)     Hematocrit 31.1 (*)     MCV 73.9 (*)     MCH 22.8 (*)     MCHC 30.9 (*)     All other components within normal limits   MAGNESIUM - Normal   TROPONIN (IN-HOUSE) - Normal    Narrative:     Troponin T Reference Range:  <= 0.03 ng/mL-   Negative for AMI  >0.03 ng/mL-     Abnormal for myocardial necrosis.  Clinicians would have to utilize clinical acumen, EKG, Troponin and serial changes to determine if it is an Acute Myocardial Infarction or myocardial injury due to an underlying chronic condition.       Results may be falsely decreased if patient taking Biotin.     HCG, SERUM, QUALITATIVE - Normal   CARBAMAZEPINE LEVEL, TOTAL - Normal   D-DIMER, QUANTITATIVE - Normal    Narrative:     Dimer values <500 ng/ml FEU are FDA approved as aid in diagnosis of deep venous thrombosis and pulmonary embolism.  This test should not be used in an exclusion strategy with pretest probability alone.    A recent guideline  regarding diagnosis for pulmonary thromboembolism recommends an adjusted exclusion criterion of age x 10 ng/ml FEU for patients >50 years of age ([employer] Intern Med 2015; 163: 701-711).     RAINBOW DRAW    Narrative:     The following orders were created for panel order [place] Draw.  Procedure                               Abnormality         Status                     ---------                               -----------         ------                     Light Blue Top[[id number]]                                   Final result               Green Top (Gel)[[id number]]                                  Final result               Lavender Top[[id number]]                                     Final result               Gold Top - SST[[id number]]                                                                Please view results for these tests on the individual orders.   CBC AND DIFFERENTIAL    Narrative:     The following orders were created for panel order CBC & Differential.  Procedure                               Abnormality         Status                     ---------                               -----------         ------                     CBC Auto Differential[[id number]]        Abnormal            Final result                 Please view results for these tests on the individual orders.   LIGHT BLUE TOP   GREEN TOP   LAVENDER TOP   GOLD TOP - SST     Ct Head Without Contrast    Result Date: 6/18/2020  Narrative: EXAM DESCRIPTION: CT HEAD WO CONTRAST CLINICAL HISTORY:  syncope   COMPARISON: 3/11/2019 DOSE LENGTH PRODUCT: 959.5 CONTRAST: None TECHNIQUE:  Axial imaging is performed from the skull base to the vertex without utilization of intravenous contrast. Coronal and sagittal reformatted images are provided. This exam was performed according to our departmental dose optimization program, which includes automated exposure control, adjustment of the mA and/or KV according to patient size and/or use of iterative  reconstruction technique. FINDINGS: No Chiari malformation. Extra-axial spaces: Unremarkable for age.  Intracranial hemorrhage: No CT evidence of intracranial hemorrhage or suspicious extra-axial fluid collection. Ventricular system: No hydrocephalus. Basal cisterns: Unremarkable. Cerebral parenchyma: No edema or mass effect identified. Gray-white differentiation is maintained. Age-appropriate parenchymal volume.  Midline shift: None.  Cerebellum: Unremarkable CT appearance. Brainstem :Unremarkable CT appearance. Calvarium: No acute or suspicious calvarial lesion identified.  Vascular system: Unremarkable noncontrast appearance.  Paranasal sinuses and mastoid air cells: There is mucosal thickening within the ethmoid sinuses. The mastoids are clear.  Visualized orbits: No acute CT findings identified.  Visualized upper cervical spine: Limited evaluation, unremarkable. Sella: Unremarkable CT appearance.  Skull base: Unremarkable. ADDITIONAL FINDINGS: None     Impression: No CT evidence of intracranial hemorrhage, mass effect, or an acute large vessel distribution infarct. Electronically signed by:  Lee Lopez MD  6/18/2020 11:10 AM CDT Workstation: [id number]    Xr Chest 1 View    Result Date: 6/18/2020  Narrative: [facility] [place] Patient Name: MS. GIOVANY VICENTE ORDERING: HARRIET BENEDICT ATTENDING: HARRIET BENEDICT REFERRING: HARRIET BENEDICT ----------------------- PROCEDURE: Chest Single View TECHNIQUE: Single AP view of the chest COMPARISON: 10/24/2019 HISTORY: syncope FINDINGS:  Life-support devices: Cardiac monitor leads superimpose the thorax. Lungs/pleura: No consolidation, pleural effusion, or pneumothorax. Heart, hilar and mediastinal structures: The heart size and mediastinal contours are within limits of normal. The trachea is midline. Skeletal Structures: No acute findings. No free air beneath the diaphragm.     Impression: No acute pulmonary or pleural finding. Electronically signed  by:  Lee Lopez MD  6/18/2020 9:04 AM CDT Workstation: [id number]                                         [facility]    Final diagnoses:   Acute non intractable tension-type headache   Acute ethmoidal sinusitis, recurrence not specified   Syncope and collapse   Anemia, unspecified type            Sourav Kerr MD  06/18/20 1126

## 2020-06-18 NOTE — ED NOTES
Patient was placed in face mask during first look triage.  Patient was wearing a face mask throughout encounter.  This RN wore personal protective equipment throughout the encounter.  Hand hygiene was performed before and after patient encounter.       Raquel Graham, RN  06/18/20 0836

## 2020-06-19 ENCOUNTER — APPOINTMENT (OUTPATIENT)
Dept: MRI IMAGING | Facility: HOSPITAL | Age: 40
End: 2020-06-19

## 2020-06-22 ENCOUNTER — HOSPITAL ENCOUNTER (OUTPATIENT)
Dept: MRI IMAGING | Facility: HOSPITAL | Age: 40
Discharge: HOME | End: 2020-06-22
Admitting: FAMILY MEDICINE

## 2020-06-22 DIAGNOSIS — M54.2 CHRONIC NECK PAIN: ICD-10-CM

## 2020-06-22 DIAGNOSIS — G89.29 CHRONIC NECK PAIN: ICD-10-CM

## 2020-06-22 DIAGNOSIS — R20.2 PARESTHESIAS WITH SUBJECTIVE WEAKNESS: ICD-10-CM

## 2020-06-22 DIAGNOSIS — R53.1 PARESTHESIAS WITH SUBJECTIVE WEAKNESS: ICD-10-CM

## 2020-06-22 PROCEDURE — 72141 MRI NECK SPINE W/O DYE: CPT

## 2020-06-26 ENCOUNTER — OFFICE VISIT (OUTPATIENT)
Dept: FAMILY MEDICINE CLINIC | Facility: CLINIC | Age: 40
End: 2020-06-26

## 2020-06-26 VITALS
WEIGHT: 199.44 LBS | BODY MASS INDEX: 39.15 KG/M2 | OXYGEN SATURATION: 99 % | HEART RATE: 96 BPM | HEIGHT: 60 IN | SYSTOLIC BLOOD PRESSURE: 118 MMHG | DIASTOLIC BLOOD PRESSURE: 76 MMHG | TEMPERATURE: 98.4 F

## 2020-06-26 DIAGNOSIS — R55 SYNCOPE, UNSPECIFIED SYNCOPE TYPE: ICD-10-CM

## 2020-06-26 DIAGNOSIS — M99.79 FORAMINAL STENOSIS DUE TO INTERVERTEBRAL DISC DISEASE: ICD-10-CM

## 2020-06-26 DIAGNOSIS — M51.9 FORAMINAL STENOSIS DUE TO INTERVERTEBRAL DISC DISEASE: ICD-10-CM

## 2020-06-26 DIAGNOSIS — M54.2 NECK PAIN: Primary | ICD-10-CM

## 2020-06-26 DIAGNOSIS — M48.00 CENTRAL STENOSIS OF SPINAL CANAL: ICD-10-CM

## 2020-06-26 PROCEDURE — 99213 OFFICE O/P EST LOW 20 MIN: CPT | Performed by: STUDENT IN AN ORGANIZED HEALTH CARE EDUCATION/TRAINING PROGRAM

## 2020-06-26 RX ORDER — ONDANSETRON 4 MG/1
TABLET, ORALLY DISINTEGRATING ORAL
COMMUNITY
Start: 2020-06-17 | End: 2020-07-01

## 2020-06-26 RX ORDER — METHOCARBAMOL 500 MG/1
1000 TABLET, FILM COATED ORAL 4 TIMES DAILY PRN
Qty: 240 TABLET | Refills: 1 | Status: SHIPPED | OUTPATIENT
Start: 2020-06-26 | End: 2020-07-26

## 2020-06-26 RX ORDER — FLUOXETINE 10 MG/1
10 CAPSULE ORAL DAILY
COMMUNITY
Start: 2020-05-23 | End: 2020-06-26 | Stop reason: DRUGHIGH

## 2020-06-26 RX ORDER — CARBAMAZEPINE 300 MG/1
600 CAPSULE, EXTENDED RELEASE ORAL DAILY
COMMUNITY
Start: 2020-05-23 | End: 2021-08-06

## 2020-06-26 NOTE — PROGRESS NOTES
I have spoken with the patient.  I have reviewed the notes, assessments, and/or procedures performed by Dr. Kapil Kramer, I concur with his  documentation and assessment and plan for Gosia Brown.          This document has been electronically signed by Booker Garcia MD on June 26, 2020 15:09

## 2020-06-26 NOTE — PROGRESS NOTES
Family Medicine Residency  Devon Kramer MD    Subjective:     Gosia Brown is a 39 y.o. female who presents for follow up neck pain and associated headache with MRI results.     Neck pain constant, sharp, midline to sided to bilateral, 5/10 to 10/10 during flare up. No known aggravating or alleviating factors. Associated with tingling in fingers b/l and headaches. Tried robaxin after last clinic visit with good effect for 2 weeks then diminishing effect.     Also concerned for syncope. Has been reportedly fainting, losing consciousness intermittently for several years. Reportedly had workup in 2014 with no answers. No post ictal confusion. Sudden occurrences.     No chest pain, shortness of air, diaphoresis, n/v    No other complaints.     The following portions of the patient's history were reviewed and updated as appropriate: allergies, current medications, past family history, past medical history, past social history, past surgical history and problem list.    Past Medical Hx:  Past Medical History:   Diagnosis Date   • Anemia    • Anxiety    • Arthritis    • Back problem    • Bipolar depression (CMS/[facility])    • Depression    • Diabetes mellitus (CMS/[facility]) 2019    On a strict diet so i dont become one. Sugar is staying elev   • GERD (gastroesophageal reflux disease)    • Heart problem    • Heartburn    • PTSD (post-traumatic stress disorder)        Past Surgical Hx:  Past Surgical History:   Procedure Laterality Date   • COLONOSCOPY N/A 2/8/2018    Procedure: COLONOSCOPY;  Surgeon: Geoff Frank MD;  Location: [facility] ENDOSCOPY;  Service:    • COLONOSCOPY N/A 1/28/2020    Procedure: COLONOSCOPY;  Surgeon: Geoff Frank MD;  Location: [facility] ENDOSCOPY;  Service: Gastroenterology   • ENDOSCOPY N/A 2/8/2018    Procedure: ESOPHAGOGASTRODUODENOSCOPY;  Surgeon: Geoff Frank MD;  Location: [facility] ENDOSCOPY;  Service:    • ENDOSCOPY N/A 1/28/2020    Procedure: ESOPHAGOGASTRODUODENOSCOPY;   Surgeon: Geoff Frank MD;  Location: [facility] ENDOSCOPY;  Service: Gastroenterology   • EYE SURGERY Right    • TUBAL ABDOMINAL LIGATION         Current Meds:    Current Outpatient Medications:   •  busPIRone (BUSPAR) 15 MG tablet, Take 15 mg by mouth 4 (Four) Times a Day., Disp: , Rfl:   •  carBAMazepine (CARBATROL) 300 MG 12 hr capsule, Take 600 mg by mouth Daily., Disp: , Rfl:   •  cefprozil (CEFZIL) 500 MG tablet, Take 1 tablet by mouth 2 (Two) Times a Day for 10 days., Disp: 20 tablet, Rfl: 0  •  clindamycin (CLEOCIN) 300 MG capsule, Take 300 mg by mouth 2 (Two) Times a Day., Disp: , Rfl:   •  clonazePAM (KlonoPIN) 0.5 MG tablet, Take 0.5 mg by mouth Daily As Needed., Disp: , Rfl:   •  ferrous sulfate (IRON SUPPLEMENT) 325 (65 FE) MG tablet, Take 1 tablet by mouth Daily With Breakfast., Disp: 30 tablet, Rfl: 4  •  FLUoxetine (PROzac) 20 MG capsule, Take 40 mg by mouth Daily., Disp: , Rfl:   •  fluticasone (FLONASE) 50 MCG/ACT nasal spray, 2 sprays into the nostril(s) as directed by provider Daily., Disp: 1 bottle, Rfl: 0  •  lisinopril (PRINIVIL,ZESTRIL) 10 MG tablet, Take 1 tablet by mouth Daily., Disp: 30 tablet, Rfl: 2  •  methocarbamol (ROBAXIN) 500 MG tablet, Take 2 tablets by mouth 4 (Four) Times a Day As Needed for Muscle Spasms for up to 30 days., Disp: 240 tablet, Rfl: 1  •  naproxen sodium (ANAPROX) 550 MG tablet, Take 1 tablet by mouth 2 (Two) Times a Day As Needed for Headache., Disp: 20 tablet, Rfl: 0  •  omeprazole (priLOSEC) 20 MG capsule, Take 1 capsule by mouth 2 (Two) Times a Day., Disp: 60 capsule, Rfl: 0  •  ondansetron ODT (ZOFRAN-ODT) 4 MG disintegrating tablet, DIS ONE T PO  Q 8 H, Disp: , Rfl:   •  propranolol (INDERAL) 20 MG tablet, Take 20 mg by mouth 3 (Three) Times a Day As Needed., Disp: , Rfl: 0  •  QUEtiapine (SEROquel) 100 MG tablet, TAKE 1 & 1 2 (ONE & ONE HALF) TABLETS BY MOUTH AT BEDTIME, Disp: , Rfl:   •  SAPHRIS 2.5 MG sublingual tablet, PLACE 1 UNDER THE TONGUE TWICE  "DAILY, Disp: , Rfl:   •  senna-docusate sodium (SENOKOT-S) 8.6-50 MG tablet, Take 1 tablet by mouth Daily., Disp: 30 tablet, Rfl: 4  •  traZODone (DESYREL) 150 MG tablet, TAKE 1 TO 1&1 2 TO 2 TABLETS BY MOUTH AT BEDTIME AS DIRECTED AS NEEDED FOR SLEEP, Disp: , Rfl:     Allergies:  No Known Allergies    Family Hx:  Family History   Problem Relation Age of Onset   • Arthritis Mother    • Hypertension Mother    • Ovarian cancer Mother    • Breast cancer Maternal Aunt    • Colon cancer Maternal Uncle    • Lung cancer Maternal Uncle    • Heart disease Maternal Grandfather    • Stroke Maternal Grandfather    • Cancer Maternal Grandfather    • Lung cancer Maternal Grandfather    • Lung cancer Maternal Aunt    • Cancer Maternal Grandmother         Social History:  Social History     Socioeconomic History   • Marital status: Single     Spouse name: Not on file   • Number of children: Not on file   • Years of education: Not on file   • Highest education level: Not on file   Tobacco Use   • Smoking status: Never Smoker   • Smokeless tobacco: Never Used   Substance and Sexual Activity   • Alcohol use: No   • Drug use: No   • Sexual activity: Defer       Review of Systems  Review of Systems   Constitutional: Negative for fatigue and fever.   HENT: Negative for congestion and rhinorrhea.    Respiratory: Negative for cough and shortness of breath.    Cardiovascular: Negative for chest pain and leg swelling.   Gastrointestinal: Negative for abdominal pain and nausea.   Genitourinary: Negative for difficulty urinating and dysuria.   Musculoskeletal: Positive for neck pain. Negative for neck stiffness.   Neurological: Positive for syncope and headaches. Negative for dizziness.   Psychiatric/Behavioral: Negative for confusion and dysphoric mood.       Objective:     /76   Pulse 96   Temp 98.4 °F (36.9 °C) (Tympanic)   Ht 152.4 cm (60\")   Wt 90.5 kg (199 lb 7 oz)   LMP 06/22/2020 (Exact Date)   SpO2 99%   BMI 38.95 kg/m²   "   Physical Exam   Constitutional: She is oriented to person, place, and time. She appears well-developed and well-nourished.   HENT:   Head: Normocephalic and atraumatic.   Nose: Nose normal.   Mouth/Throat: Oropharynx is clear and moist.   Eyes: Pupils are equal, round, and reactive to light. EOM are normal.   Neck: Normal range of motion. Neck supple.   Cardiovascular: Normal rate and regular rhythm.   Pulmonary/Chest: Effort normal and breath sounds normal.   Abdominal: Soft. Bowel sounds are normal.   Musculoskeletal: She exhibits tenderness. She exhibits no edema.   Cervical spine: LROM due to pain, tense muscles, spasm, no midline tenderness. Right sided tenderness.    Neurological: She is alert and oriented to person, place, and time.   Skin: Skin is warm. Capillary refill takes less than 2 seconds.   Psychiatric: She has a normal mood and affect. Her behavior is normal.        Assessment/Plan:     Gosia was seen today for neck pain.    Diagnoses and all orders for this visit:    Neck pain  -     Ambulatory Referral to Physical Therapy Evaluate and treat (neck pain and associated headaches)  -     Ambulatory Referral to Neurosurgery  -     Ambulatory Referral to Pain Management    Central stenosis of spinal canal  -     Ambulatory Referral to Physical Therapy Evaluate and treat (neck pain and associated headaches)  -     Ambulatory Referral to Neurosurgery  -     Ambulatory Referral to Pain Management    Foraminal stenosis due to intervertebral disc disease  -     Ambulatory Referral to Physical Therapy Evaluate and treat (neck pain and associated headaches)  -     Ambulatory Referral to Neurosurgery  -     Ambulatory Referral to Pain Management    Syncope, unspecified syncope type  -     Adult Transthoracic Echo Complete W/ Cont if Necessary Per Protocol; Future  -     ECG 12 Lead; Future  -     US carotid bilateral; Future    Other orders  -     methocarbamol (ROBAXIN) 500 MG tablet; Take 2 tablets by  mouth 4 (Four) Times a Day As Needed for Muscle Spasms for up to 30 days.    Mrs Brown is a 38 y/o woman who presents for follow up of neck pain. Neck pain is stable, controlled. Will increase robaxin to 1000 qid prn muscle spasms. Advised rotating her pain medicines including naproxen and tylenol. Will refer to PT for neck pain and associated headaches. Will refer to chronic pain management for evaluation. Will refer to neurosurgery for evaluation. For her syncope, new to me, additional work up planned including EKG, ECHO, bilateral carotid US    · Rx changes: incr robaxin  · Patient Education: diet, exercise  · Compliance at present is estimated to be fair.   · Efforts to improve compliance (if necessary) will be directed at increased exercise.     Follow-up:     Return in about 3 months (around 9/26/2020).    Preventative:  Health Maintenance   Topic Date Due   • ANNUAL PHYSICAL  11/15/1982   • HEPATITIS C SCREENING  12/06/2017   • PAP SMEAR  12/06/2017   • INFLUENZA VACCINE  08/01/2020   • COLONOSCOPY  01/28/2025   • TDAP/TD VACCINES (2 - Td) 10/14/2029     Female Preventative: UTD  Recommended: none  Vaccine Counseling: N/A    Weight  -Class: Overweight: 25.0-29.9kg/m2   -Patient's Body mass index is 38.95 kg/m². BMI is above normal parameters. Recommendations include: exercise counseling and nutrition counseling.   eat more fruits and vegetables, decrease soda or juice intake, increase water intake, increase physical activity, cut out extra servings, reduce fast food intake and have 3 meals a day    Alcohol use:  reports that she does not drink alcohol.  Nicotine status  reports that she has never smoked. She has never used smokeless tobacco.    Goals    None         RISK SCORE: 3      Devon Kramer M.D. PGY2  Psychiatric Family Medicine Residency  [address] [place] [address]  Office: [phone]    This document has been electronically signed by Devon Kramer MD on  June 26, 2020 15:05

## 2020-06-30 DIAGNOSIS — R55 SYNCOPE, UNSPECIFIED SYNCOPE TYPE: Primary | ICD-10-CM

## 2020-07-01 ENCOUNTER — OFFICE VISIT (OUTPATIENT)
Dept: FAMILY MEDICINE CLINIC | Facility: CLINIC | Age: 40
End: 2020-07-01

## 2020-07-01 VITALS — BODY MASS INDEX: 38.95 KG/M2 | HEIGHT: 60 IN

## 2020-07-01 DIAGNOSIS — B00.89 RECURRENT ORAL HERPES SIMPLEX INFECTION: Primary | ICD-10-CM

## 2020-07-01 PROCEDURE — 99213 OFFICE O/P EST LOW 20 MIN: CPT | Performed by: STUDENT IN AN ORGANIZED HEALTH CARE EDUCATION/TRAINING PROGRAM

## 2020-07-01 RX ORDER — ACYCLOVIR 400 MG/1
400 TABLET ORAL
Qty: 25 TABLET | Refills: 0 | Status: SHIPPED | OUTPATIENT
Start: 2020-07-01 | End: 2020-07-06

## 2020-07-01 NOTE — PROGRESS NOTES
"  Family Medicine Residency  Devon Kramer MD    Subjective:   You have chosen to receive care through a telehealth visit.  Do you consent to use a video/audio connection for your medical care today? Yes    Gosia Brown is a 39 y.o. female who presents for initial evaluation of fever blisters.    Patient reports that \"mouth full of fever blisters\". Recurrent problem, aggravated by acidic foods. Hasnt had in 1 year. Spread in sun light. Has tried abreva and other OTC balms for herpes to no effect. Has hx of recurrent oral herpes responsive to acyclovir.     Not itchy, swollen, painful.    No other complaints.     The following portions of the patient's history were reviewed and updated as appropriate: allergies, current medications, past family history, past medical history, past social history, past surgical history and problem list.    Past Medical Hx:  Past Medical History:   Diagnosis Date   • Anemia    • Anxiety    • Arthritis    • Back problem    • Bipolar depression (CMS/[facility])    • Depression    • Diabetes mellitus (CMS/[facility]) 2019    On a strict diet so i dont become one. Sugar is staying elev   • GERD (gastroesophageal reflux disease)    • Heart problem    • Heartburn    • PTSD (post-traumatic stress disorder)        Past Surgical Hx:  Past Surgical History:   Procedure Laterality Date   • COLONOSCOPY N/A 2/8/2018    Procedure: COLONOSCOPY;  Surgeon: Geoff Frank MD;  Location: [facility] ENDOSCOPY;  Service:    • COLONOSCOPY N/A 1/28/2020    Procedure: COLONOSCOPY;  Surgeon: Geoff Frank MD;  Location: [facility] ENDOSCOPY;  Service: Gastroenterology   • ENDOSCOPY N/A 2/8/2018    Procedure: ESOPHAGOGASTRODUODENOSCOPY;  Surgeon: Geoff Frank MD;  Location: [facility] ENDOSCOPY;  Service:    • ENDOSCOPY N/A 1/28/2020    Procedure: ESOPHAGOGASTRODUODENOSCOPY;  Surgeon: Geoff Frank MD;  Location: [facility] ENDOSCOPY;  Service: Gastroenterology   • EYE SURGERY Right    • TUBAL ABDOMINAL LIGATION   "       Current Meds:    Current Outpatient Medications:   •  busPIRone (BUSPAR) 15 MG tablet, Take 15 mg by mouth 4 (Four) Times a Day., Disp: , Rfl:   •  carBAMazepine (CARBATROL) 300 MG 12 hr capsule, Take 600 mg by mouth Daily., Disp: , Rfl:   •  clonazePAM (KlonoPIN) 0.5 MG tablet, Take 0.5 mg by mouth Daily As Needed., Disp: , Rfl:   •  ferrous sulfate (IRON SUPPLEMENT) 325 (65 FE) MG tablet, Take 1 tablet by mouth Daily With Breakfast., Disp: 30 tablet, Rfl: 4  •  FLUoxetine (PROzac) 20 MG capsule, Take 40 mg by mouth Daily., Disp: , Rfl:   •  fluticasone (FLONASE) 50 MCG/ACT nasal spray, 2 sprays into the nostril(s) as directed by provider Daily., Disp: 1 bottle, Rfl: 0  •  lisinopril (PRINIVIL,ZESTRIL) 10 MG tablet, Take 1 tablet by mouth Daily., Disp: 30 tablet, Rfl: 2  •  methocarbamol (ROBAXIN) 500 MG tablet, Take 2 tablets by mouth 4 (Four) Times a Day As Needed for Muscle Spasms for up to 30 days., Disp: 240 tablet, Rfl: 1  •  QUEtiapine (SEROquel) 100 MG tablet, TAKE 1 & 1 2 (ONE & ONE HALF) TABLETS BY MOUTH AT BEDTIME, Disp: , Rfl:   •  SAPHRIS 2.5 MG sublingual tablet, PLACE 1 UNDER THE TONGUE TWICE DAILY, Disp: , Rfl:   •  traZODone (DESYREL) 150 MG tablet, TAKE 1 TO 1&1 2 TO 2 TABLETS BY MOUTH AT BEDTIME AS DIRECTED AS NEEDED FOR SLEEP, Disp: , Rfl:   •  acyclovir (ZOVIRAX) 400 MG tablet, Take 1 tablet by mouth Every 4 (Four) Hours While Awake for 5 days. Take no more than 5 doses a day., Disp: 25 tablet, Rfl: 0    Allergies:  No Known Allergies    Family Hx:  Family History   Problem Relation Age of Onset   • Arthritis Mother    • Hypertension Mother    • Ovarian cancer Mother    • Breast cancer Maternal Aunt    • Colon cancer Maternal Uncle    • Lung cancer Maternal Uncle    • Heart disease Maternal Grandfather    • Stroke Maternal Grandfather    • Cancer Maternal Grandfather    • Lung cancer Maternal Grandfather    • Lung cancer Maternal Aunt    • Cancer Maternal Grandmother         Social  "History:  Social History     Socioeconomic History   • Marital status: Single     Spouse name: Not on file   • Number of children: Not on file   • Years of education: Not on file   • Highest education level: Not on file   Tobacco Use   • Smoking status: Never Smoker   • Smokeless tobacco: Never Used   Substance and Sexual Activity   • Alcohol use: No   • Drug use: No   • Sexual activity: Defer       Review of Systems  Review of Systems   Constitutional: Negative for appetite change, diaphoresis, fatigue and fever.   HENT: Negative for ear pain, postnasal drip, rhinorrhea and sore throat.         Labial sores   Eyes: Negative for pain and visual disturbance.   Respiratory: Negative for cough, chest tightness and shortness of breath.    Cardiovascular: Negative for chest pain, palpitations and leg swelling.   Gastrointestinal: Negative for abdominal pain, constipation, diarrhea, nausea and vomiting.   Genitourinary: Negative for dysuria, flank pain, frequency and urgency.   Musculoskeletal: Negative for arthralgias, back pain and myalgias.   Skin: Negative for pallor and rash.   Neurological: Negative for dizziness, seizures, syncope, weakness and numbness.   Psychiatric/Behavioral: Negative for agitation, confusion, decreased concentration and dysphoric mood.       Objective:     Ht 152.4 cm (60\")   LMP 06/22/2020 (Exact Date)   BMI 38.95 kg/m²   Physical Exam   Constitutional: She is oriented to person, place, and time. She appears well-developed and well-nourished.   HENT:   mutliple sores along ridges of upper and lower lips. No oropharynx involvement. Some spreading to surrounding skin outside lips   Eyes: Pupils are equal, round, and reactive to light. EOM are normal.   Neck: Normal range of motion. Neck supple.   Neurological: She is alert and oriented to person, place, and time.   Psychiatric: She has a normal mood and affect. Her behavior is normal.        Assessment/Plan:     Gosia was seen today for " mouth lesions.    Diagnoses and all orders for this visit:    Recurrent oral herpes simplex infection  -     acyclovir (ZOVIRAX) 400 MG tablet; Take 1 tablet by mouth Every 4 (Four) Hours While Awake for 5 days. Take no more than 5 doses a day.    40 y/o presents with multiple, spreading oral sores. Has history of recurrent oral herpes outbreaks.     · Rx changes: n/a  · Patient Education: n/a  · Compliance at present is estimated to be good.   · Efforts to improve compliance (if necessary) will be directed at increased exercise.     Follow-up:     Return in about 2 weeks (around 7/15/2020).    Preventative:  Health Maintenance   Topic Date Due   • ANNUAL PHYSICAL  11/15/1982   • HEPATITIS C SCREENING  12/06/2017   • PAP SMEAR  12/06/2017   • INFLUENZA VACCINE  08/01/2020   • COLONOSCOPY  01/28/2025   • TDAP/TD VACCINES (2 - Td) 10/14/2029     Alcohol use:  reports that she does not drink alcohol.  Nicotine status  reports that she has never smoked. She has never used smokeless tobacco.    Goals    None         RISK SCORE: 3      Devon Kramer M.D. PGY2  [facility] Family Medicine Residency  [address] [place] [address]  Office: [phone]    This document has been electronically signed by Devon Kramer MD on July 1, 2020 14:39    Time: 8 minutes

## 2020-07-01 NOTE — PROGRESS NOTES
I have seen the patient.  I have reviewed the notes, assessments, and/or procedures performed by Devon Kramer MD, I concur with her/his documentation and assessment and plan for Gosia Brown.               This document has been electronically signed by Rodrick Giron MD on July 1, 2020 14:48

## 2020-07-08 DIAGNOSIS — R55 SYNCOPE, UNSPECIFIED SYNCOPE TYPE: Primary | ICD-10-CM

## 2020-08-25 RX ORDER — METHOCARBAMOL 500 MG/1
1000 TABLET, FILM COATED ORAL 4 TIMES DAILY
Qty: 120 TABLET | Refills: 2 | Status: SHIPPED | OUTPATIENT
Start: 2020-08-25 | End: 2020-11-16 | Stop reason: SDUPTHER

## 2020-08-25 NOTE — TELEPHONE ENCOUNTER
PATIENT CALLED AND IS NEEDING REFILLS ON THE methocarbamol (ROBAXIN) 500 MG tablet AND WOULD LIKE FOR IT TO BE CALLED INTO [facility]. HER NUMBER TO CALL BACK [phone].        THANK YOU,        ALEXIA

## 2020-09-04 ENCOUNTER — OFFICE VISIT (OUTPATIENT)
Dept: OBSTETRICS AND GYNECOLOGY | Facility: CLINIC | Age: 40
End: 2020-09-04

## 2020-09-04 ENCOUNTER — TELEPHONE (OUTPATIENT)
Dept: OBSTETRICS AND GYNECOLOGY | Facility: CLINIC | Age: 40
End: 2020-09-04

## 2020-09-04 VITALS
DIASTOLIC BLOOD PRESSURE: 76 MMHG | BODY MASS INDEX: 39.85 KG/M2 | WEIGHT: 203 LBS | HEIGHT: 60 IN | SYSTOLIC BLOOD PRESSURE: 118 MMHG

## 2020-09-04 DIAGNOSIS — Z12.31 SCREENING MAMMOGRAM, ENCOUNTER FOR: ICD-10-CM

## 2020-09-04 DIAGNOSIS — Z12.31 ENCOUNTER FOR SCREENING MAMMOGRAM FOR MALIGNANT NEOPLASM OF BREAST: ICD-10-CM

## 2020-09-04 DIAGNOSIS — Z12.4 ENCOUNTER FOR PAPANICOLAOU SMEAR FOR CERVICAL CANCER SCREENING: Primary | ICD-10-CM

## 2020-09-04 DIAGNOSIS — N93.9 ABNORMAL UTERINE BLEEDING (AUB): ICD-10-CM

## 2020-09-04 DIAGNOSIS — N89.8 VAGINAL DISCHARGE: ICD-10-CM

## 2020-09-04 LAB
CANDIDA ALBICANS: POSITIVE
GARDNERELLA VAGINALIS: NEGATIVE
T VAGINALIS DNA VAG QL PROBE+SIG AMP: POSITIVE

## 2020-09-04 PROCEDURE — 99396 PREV VISIT EST AGE 40-64: CPT | Performed by: NURSE PRACTITIONER

## 2020-09-04 PROCEDURE — 87660 TRICHOMONAS VAGIN DIR PROBE: CPT | Performed by: NURSE PRACTITIONER

## 2020-09-04 PROCEDURE — 87480 CANDIDA DNA DIR PROBE: CPT | Performed by: NURSE PRACTITIONER

## 2020-09-04 PROCEDURE — 87510 GARDNER VAG DNA DIR PROBE: CPT | Performed by: NURSE PRACTITIONER

## 2020-09-04 RX ORDER — FLUCONAZOLE 150 MG/1
TABLET ORAL
Qty: 2 TABLET | Refills: 0 | Status: SHIPPED | OUTPATIENT
Start: 2020-09-04 | End: 2020-09-22

## 2020-09-04 RX ORDER — METRONIDAZOLE 500 MG/1
TABLET ORAL
Qty: 4 TABLET | Refills: 0 | Status: SHIPPED | OUTPATIENT
Start: 2020-09-04 | End: 2020-09-22

## 2020-09-04 NOTE — PROGRESS NOTES
Subjective   Gosia Brown is a 39 y.o. here for gyn annual and heavy menses    LMP: 2020  BC: tubal ligation  Pap: 2010, per patient    Usha is a  who desires management of her heavy periods. This has been an issue since  and bleeds for 10-12 days at a time. Pt is anemic and is taking iron. States her doctors won't give her a hysterectomy and they had previously offered to do an ablation. Reports frequent UTIs, has completed several abx recently and has mild vaginal itching today. Declines need for STI screening.     Gynecologic Exam   The patient's primary symptoms include genital itching. The patient's pertinent negatives include no genital lesions, genital odor, genital rash, missed menses, pelvic pain, vaginal bleeding or vaginal discharge. This is a new problem. The current episode started in the past 7 days. The problem occurs intermittently. The problem has been waxing and waning. The patient is experiencing no pain. Associated symptoms include frequency. Pertinent negatives include no abdominal pain, chills, constipation, diarrhea, dysuria, fever, nausea, rash or sore throat. Nothing aggravates the symptoms. She has tried nothing for the symptoms. She is sexually active. No, her partner does not have an STD. She uses tubal ligation for contraception. Her menstrual history has been regular. Her past medical history is significant for menorrhagia. There is no history of a  section, a gynecological surgery, an STD or vaginosis.   Menorrhagia   This is a chronic problem. The current episode started more than 1 year ago. The problem occurs every several days. The problem has been unchanged. Associated symptoms include fatigue. Pertinent negatives include no abdominal pain, chest pain, chills, fever, nausea, rash, sore throat or weakness. Nothing aggravates the symptoms. She has tried nothing for the symptoms. The treatment provided no relief.       The following portions of the  patient's history were reviewed and updated as appropriate: allergies, current medications, past family history, past medical history, past social history, past surgical history and problem list.    Review of Systems   Constitutional: Positive for fatigue. Negative for chills, fever, unexpected weight gain and unexpected weight loss.   HENT: Negative for sneezing and sore throat.    Respiratory: Negative for shortness of breath.    Cardiovascular: Negative for chest pain and palpitations.   Gastrointestinal: Negative for abdominal pain, constipation, diarrhea and nausea.   Endocrine: Positive for heat intolerance. Negative for cold intolerance.   Genitourinary: Positive for frequency, menorrhagia and menstrual problem. Negative for breast discharge, breast lump, breast pain, difficulty urinating, dysuria, missed menses, pelvic pain, pelvic pressure, urinary incontinence, vaginal bleeding, vaginal discharge and vaginal pain.        Nipple pain   Skin: Negative for rash.   Neurological: Negative for weakness and headache.   Psychiatric/Behavioral: Positive for sleep disturbance and stress. Negative for depressed mood.       Objective   Physical Exam   Constitutional: She is oriented to person, place, and time. She appears well-developed and well-nourished.   HENT:   Head: Normocephalic.   Neck: Normal range of motion. Neck supple.   Cardiovascular: Normal rate and regular rhythm.   Pulmonary/Chest: Effort normal and breath sounds normal. Right breast exhibits no inverted nipple, no mass, no nipple discharge, no skin change and no tenderness. Left breast exhibits no inverted nipple, no mass, no nipple discharge, no skin change and no tenderness.   Abdominal: Soft.   Genitourinary: Rectal exam shows no external hemorrhoid. There is no rash, tenderness, lesion or injury on the right labia. There is no rash, tenderness, lesion or injury on the left labia. Uterus is not deviated, not enlarged, not fixed and not tender.  Cervix exhibits no motion tenderness, no discharge and no friability. Right adnexum displays no mass, no tenderness and no fullness. Left adnexum displays no mass, no tenderness and no fullness. No erythema, tenderness or bleeding in the vagina. No foreign body in the vagina. No signs of injury around the vagina. Vaginal discharge found.   Genitourinary Comments: Pap and Vag panel obtained.    Musculoskeletal: Normal range of motion.   Neurological: She is alert and oriented to person, place, and time.   Skin: Skin is warm and dry.   Psychiatric: She has a normal mood and affect. Her behavior is normal.   Nursing note and vitals reviewed.        Assessment/Plan   Gosia was seen today for gynecologic exam.    Diagnoses and all orders for this visit:    Encounter for Papanicolaou smear for cervical cancer screening  -     Liquid-based Pap Smear, Screening    Screening mammogram, encounter for  -     Mammo Screening Digital Tomosynthesis Bilateral With CAD; Future    Abnormal uterine bleeding (AUB)  -     US Non-ob Transvaginal; Future  -     CBC Auto Differential  -     Follicle Stimulating Hormone  -     Prolactin  -     TSH+Free T4  -     Luteinizing Hormone    Encounter for screening mammogram for malignant neoplasm of breast    Vaginal discharge  -     Gardnerella vaginalis, Trichomonas vaginalis, Candida albicans, DNA - Swab, Vagina      Reviewed cytology and mammogram guidelines. Will complete TVUS and labs to r/o structural and hormonal causes of heavy periods. Pt also to complete mammogram. Counseled on the option of Mirena to treat heavy menses; pt is agreeable. Brochure given. Pt to return in 2 weeks for possible placement of IUD.

## 2020-09-09 ENCOUNTER — LAB (OUTPATIENT)
Dept: LAB | Facility: HOSPITAL | Age: 40
End: 2020-09-09

## 2020-09-09 LAB
BASOPHILS # BLD AUTO: 0.03 10*3/MM3 (ref 0–0.2)
BASOPHILS NFR BLD AUTO: 0.4 % (ref 0–1.5)
DEPRECATED RDW RBC AUTO: 50.3 FL (ref 37–54)
EOSINOPHIL # BLD AUTO: 0.09 10*3/MM3 (ref 0–0.4)
EOSINOPHIL NFR BLD AUTO: 1.3 % (ref 0.3–6.2)
ERYTHROCYTE [DISTWIDTH] IN BLOOD BY AUTOMATED COUNT: 17.5 % (ref 12.3–15.4)
FSH SERPL-ACNC: 4.33 MIU/ML
HCT VFR BLD AUTO: 33.9 % (ref 34–46.6)
HGB BLD-MCNC: 10.8 G/DL (ref 12–15.9)
IMM GRANULOCYTES # BLD AUTO: 0.02 10*3/MM3 (ref 0–0.05)
IMM GRANULOCYTES NFR BLD AUTO: 0.3 % (ref 0–0.5)
LH SERPL-ACNC: 4.61 MIU/ML
LYMPHOCYTES # BLD AUTO: 1.93 10*3/MM3 (ref 0.7–3.1)
LYMPHOCYTES NFR BLD AUTO: 27.8 % (ref 19.6–45.3)
MCH RBC QN AUTO: 25.2 PG (ref 26.6–33)
MCHC RBC AUTO-ENTMCNC: 31.9 G/DL (ref 31.5–35.7)
MCV RBC AUTO: 79 FL (ref 79–97)
MONOCYTES # BLD AUTO: 0.5 10*3/MM3 (ref 0.1–0.9)
MONOCYTES NFR BLD AUTO: 7.2 % (ref 5–12)
NEUTROPHILS NFR BLD AUTO: 4.36 10*3/MM3 (ref 1.7–7)
NEUTROPHILS NFR BLD AUTO: 63 % (ref 42.7–76)
NRBC BLD AUTO-RTO: 0 /100 WBC (ref 0–0.2)
PLATELET # BLD AUTO: 348 10*3/MM3 (ref 140–450)
PMV BLD AUTO: 9.6 FL (ref 6–12)
PROLACTIN SERPL-MCNC: 16.2 NG/ML (ref 4.79–23.3)
RBC # BLD AUTO: 4.29 10*6/MM3 (ref 3.77–5.28)
T4 FREE SERPL-MCNC: 0.82 NG/DL (ref 0.93–1.7)
TSH SERPL DL<=0.05 MIU/L-ACNC: 2.32 UIU/ML (ref 0.27–4.2)
WBC # BLD AUTO: 6.93 10*3/MM3 (ref 3.4–10.8)

## 2020-09-09 PROCEDURE — 85025 COMPLETE CBC W/AUTO DIFF WBC: CPT | Performed by: NURSE PRACTITIONER

## 2020-09-09 PROCEDURE — 84439 ASSAY OF FREE THYROXINE: CPT | Performed by: NURSE PRACTITIONER

## 2020-09-09 PROCEDURE — 84146 ASSAY OF PROLACTIN: CPT | Performed by: NURSE PRACTITIONER

## 2020-09-09 PROCEDURE — 83002 ASSAY OF GONADOTROPIN (LH): CPT | Performed by: NURSE PRACTITIONER

## 2020-09-09 PROCEDURE — 36415 COLL VENOUS BLD VENIPUNCTURE: CPT | Performed by: NURSE PRACTITIONER

## 2020-09-09 PROCEDURE — 83001 ASSAY OF GONADOTROPIN (FSH): CPT | Performed by: NURSE PRACTITIONER

## 2020-09-09 PROCEDURE — 84443 ASSAY THYROID STIM HORMONE: CPT | Performed by: NURSE PRACTITIONER

## 2020-09-15 LAB
LAB AP CASE REPORT: NORMAL
PATH INTERP SPEC-IMP: NORMAL

## 2020-09-22 ENCOUNTER — OFFICE VISIT (OUTPATIENT)
Dept: FAMILY MEDICINE CLINIC | Facility: CLINIC | Age: 40
End: 2020-09-22

## 2020-09-22 VITALS
WEIGHT: 201.19 LBS | HEART RATE: 91 BPM | BODY MASS INDEX: 39.5 KG/M2 | DIASTOLIC BLOOD PRESSURE: 78 MMHG | TEMPERATURE: 96.9 F | SYSTOLIC BLOOD PRESSURE: 130 MMHG | OXYGEN SATURATION: 99 % | HEIGHT: 60 IN

## 2020-09-22 DIAGNOSIS — G44.219 EPISODIC TENSION-TYPE HEADACHE, NOT INTRACTABLE: ICD-10-CM

## 2020-09-22 DIAGNOSIS — M54.2 NECK PAIN: Primary | ICD-10-CM

## 2020-09-22 PROCEDURE — 99213 OFFICE O/P EST LOW 20 MIN: CPT | Performed by: STUDENT IN AN ORGANIZED HEALTH CARE EDUCATION/TRAINING PROGRAM

## 2020-09-22 RX ORDER — FLUOXETINE HYDROCHLORIDE 20 MG/1
20 CAPSULE ORAL DAILY
Qty: 30 CAPSULE | Refills: 1
Start: 2020-09-22 | End: 2021-01-21

## 2020-09-22 RX ORDER — DULOXETIN HYDROCHLORIDE 20 MG/1
20 CAPSULE, DELAYED RELEASE ORAL DAILY
Qty: 30 CAPSULE | Refills: 1 | Status: SHIPPED | OUTPATIENT
Start: 2020-09-22 | End: 2020-09-22

## 2020-09-22 RX ORDER — GABAPENTIN 100 MG/1
CAPSULE ORAL
COMMUNITY
Start: 2020-09-13 | End: 2021-01-21 | Stop reason: SDUPTHER

## 2020-09-22 RX ORDER — QUETIAPINE FUMARATE 300 MG/1
300 TABLET, FILM COATED ORAL NIGHTLY
COMMUNITY
End: 2021-08-06

## 2020-09-22 RX ORDER — RIZATRIPTAN BENZOATE 5 MG/1
5 TABLET ORAL ONCE AS NEEDED
Qty: 13 TABLET | Refills: 0 | Status: SHIPPED | OUTPATIENT
Start: 2020-09-22 | End: 2020-11-18 | Stop reason: SDUPTHER

## 2020-09-25 ENCOUNTER — OFFICE VISIT (OUTPATIENT)
Dept: OBSTETRICS AND GYNECOLOGY | Facility: CLINIC | Age: 40
End: 2020-09-25

## 2020-09-25 VITALS
WEIGHT: 203 LBS | SYSTOLIC BLOOD PRESSURE: 158 MMHG | HEIGHT: 60 IN | DIASTOLIC BLOOD PRESSURE: 92 MMHG | BODY MASS INDEX: 39.85 KG/M2

## 2020-09-25 DIAGNOSIS — R35.0 URINARY FREQUENCY: Primary | ICD-10-CM

## 2020-09-25 DIAGNOSIS — N93.9 ABNORMAL UTERINE BLEEDING (AUB): ICD-10-CM

## 2020-09-25 DIAGNOSIS — N39.3 STRESS INCONTINENCE OF URINE: ICD-10-CM

## 2020-09-25 DIAGNOSIS — Z30.430 ENCOUNTER FOR IUD INSERTION: ICD-10-CM

## 2020-09-25 PROCEDURE — 99213 OFFICE O/P EST LOW 20 MIN: CPT | Performed by: NURSE PRACTITIONER

## 2020-09-25 PROCEDURE — 58100 BIOPSY OF UTERUS LINING: CPT | Performed by: NURSE PRACTITIONER

## 2020-09-25 PROCEDURE — 58300 INSERT INTRAUTERINE DEVICE: CPT | Performed by: NURSE PRACTITIONER

## 2020-09-25 NOTE — PROGRESS NOTES
Subjective   Gosia Brown is a 39 y.o. here for follow-up for AUB      Gosia returns today for follow-up regarding AUB. She c/o of heavy periods since 2008 and will bleed for 10-12 days a time. LMP was 8/18/2020; pt has not had a period this month yet. Pelvic US suggest probable fibroids and recommendation for endometrial sampling due to thickened endometrium. Pt also desires evaluation for urinary frequency and incontinence; denies dysuria, back pain, or urgency. Pt is open to Mirena for tx of heavy menses.       PROCEDURE: US NON-OB TRANSVAGINAL     INDICATION:  Abnormal uterine bleeding, heavy menses      COMPARISON:  CT of abdomen and pelvis dated 12/3/2019     TECHNIQUE:  Transvaginal     FINDINGS:       - uterus: Mildly prominent, measuring 10.1 x 6.1 x 7.2 cm                   myomata:  Multiple probable fibroids, the  largest of which is in the right anterior fundus measuring 2.3 cm  in greatest dimension                   endometrial canal: Thickened at 2.4 cm and  contains a small amount of fluid in the endometrial canal    - ovaries:        - right:  Normal size, measuring 2.8 x 1.7 x 2.8 cm                    echotexture:   Physiologic , no complex / solid  mass(es)        - left: No ovarian tissue visualized, but there is a simple  appearing cyst measuring 2.9 x 2.9 x 3.3 cm    - cul-de-sac: No free fluid      IMPRESSION:  1. Mildly prominent uterus with probable fibroids as described  above  2. Thickened endometrium at 2.4 cm, with a small amount of fluid  in the endometrial canal. Endometrial sampling may be considered  to further evaluate if deemed clinically warranted.  3. No definite left ovarian tissue identified. There is a left  ovarian or adnexal simple appearing cyst measuring 2.9 cm in  greatest dimension. Follow-up may be considered as is deemed  clinically warranted  4. Normal appearance of the right ovary        Electronically signed by:  Lashaun Jane MD  9/9/2020 2:01 PM  CDT  Workstation: [id number]      The following portions of the patient's history were reviewed and updated as appropriate: allergies, current medications, past family history, past medical history, past social history, past surgical history and problem list.    Review of Systems   Constitutional: Negative for fatigue and unexpected weight gain.   Gastrointestinal: Negative for abdominal pain, constipation, diarrhea and nausea.   Genitourinary: Positive for frequency, menstrual problem and urinary incontinence. Negative for difficulty urinating, dyspareunia, dysuria, flank pain, urgency, vaginal bleeding, vaginal discharge and vaginal pain.       Objective   Physical Exam  Constitutional:       Appearance: She is obese.   HENT:      Head: Normocephalic.   Neck:      Musculoskeletal: Normal range of motion.   Pulmonary:      Effort: Pulmonary effort is normal.   Neurological:      Mental Status: She is alert.   Psychiatric:         Mood and Affect: Mood normal.         Thought Content: Thought content normal.         Assessment/Plan   Diagnoses and all orders for this visit:    Urinary frequency  -     Ambulatory Referral to Physical Therapy Evaluate and treat    Stress incontinence of urine  -     Ambulatory Referral to Physical Therapy Evaluate and treat    Abnormal uterine bleeding (AUB)  -     Tissue Pathology Exam; Future  -     Tissue Pathology Exam    Encounter for IUD insertion  -     levonorgestrel (MIRENA) 20 MCG/24HR IUD        Reviewed pelvic ultrasound findings and labs with patient. Pt agreeable to EMB and Mirena placement today. Will refer her to physical therapy for her stress urinary incontinence and frequency.        Endometrial Biopsy    Date of procedure:  9/25/2020    Procedure documentation:    The cervix was grasped anterior at the 2 o'clock and 11 o'clock position.  The cavity sounded to 9 centimeters.  An endometrial biopsy specimen was obtained. The tissue was sent for permanent  histopathologic evaluation.  Tenaculum was removed from the cervix with scant bleeding. She tolerated the procedure well.    Post procedure instructions: She was instructed to call us in 1 week's time if she has not heard from us otherwise.  If there is any significant fever or excessive bleeding or pain she is to call immediately.      This note was electronically signed.    Sneha SCOTT Alonso  September 25, 2020 IUD Insertion    Patient's last menstrual period was 08/18/2020 (exact date).    Date of procedure:  9/25/2020    Risks and benefits discussed? yes  All questions answered? yes  Consents given by The patient  Written consent obtained? yes    Local anesthesia used:  no    Procedure documentation:    After verifying the patient had a low probability of being pregnant and met the criteria for insertion, a speculum has placed and the cervix was cleansed with an antiseptic solution.  The anterior lip of the cervix was grasped and the uterine cavity was gently sounded. There was none difficulty passing the sound through the cervix.  Cervical dilation did not need to be performed prior to placing the IUD.  The uterus was anteverted and sounded to 9 cms.  The Mirena was then prepared per the manufacturers instructions.    The Mirena was advanced to a point 2 cms from the fundus and then the arms were released from the sheath.  The device was advanced to the fundus and the device was released fully from the sheath.. The string was cut 3 cms in length.  Bleeding from the cervix was scant.    She tolerated the procedure without any difficulty.    Post procedure instructions: Call if any fever or excessive bleeding or pain.    Follow up needed:  PRN for problems    This note was electronically signed.    Sneha SCOTT Alonso  September 25, 2020  IUD Insertion    Patient's last menstrual period was 08/18/2020 (exact date).    Date of procedure:  9/25/2020    Risks and benefits discussed? yes  All questions  answered? yes  Consents given by The patient  Written consent obtained? yes    Local anesthesia used:  no    Procedure documentation:    After verifying the patient had a low probability of being pregnant and met the criteria for insertion, a speculum has placed and the cervix was cleansed with an antiseptic solution.  The anterior lip of the cervix was grasped and the uterine cavity was gently sounded. There was none difficulty passing the sound through the cervix.  Cervical dilation did not need to be performed prior to placing the IUD.  The uterus was anteverted and sounded to 9 cms.  The Mirena was then prepared per the manufacturers instructions.    The Mirena was advanced to a point 2 cms from the fundus and then the arms were released from the sheath. The device was advanced to the fundus and the device was released fully from the sheath.. The string was cut 3 cms in length.  Bleeding from the cervix was scant.    She tolerated the procedure without any difficulty.    Post procedure instructions: Call if any fever or excessive bleeding or pain.    Follow up needed: 1 month for string check or PRN for problems    This note was electronically signed.    Sneha Alonso, APRN  September 25, 2020

## 2020-09-29 LAB
LAB AP CASE REPORT: NORMAL
LAB AP CLINICAL INFORMATION: NORMAL
PATH REPORT.FINAL DX SPEC: NORMAL

## 2020-10-05 NOTE — PROGRESS NOTES
Family Medicine Residency  Devon Kramer MD    Subjective:     Gosia Brown is a 39 y.o. female who presents for follow up neck pain.    Chronic problem. XR cervical spine with multilevel degenerative changes. Sees chronic pain management endorses pain increasing with recent injection; unsure what injection was. Pain is sharp, 6/10, constant, localized to neck. Aggravated by stress, movement. No alleviating factors. Associated with headaches described as tension as well as migraine with both pressure and pulsating.     No other complaints.     The following portions of the patient's history were reviewed and updated as appropriate: allergies, current medications, past family history, past medical history, past social history, past surgical history and problem list.    Past Medical Hx:  Past Medical History:   Diagnosis Date   • Anemia    • Anxiety    • Arthritis    • Back problem    • Bipolar depression (CMS/[facility])    • Depression    • Diabetes mellitus (CMS/HCC) 2019    On a strict diet so i dont become one. Sugar is staying elev   • GERD (gastroesophageal reflux disease)    • Heart problem    • Heartburn    • PTSD (post-traumatic stress disorder)        Past Surgical Hx:  Past Surgical History:   Procedure Laterality Date   • COLONOSCOPY N/A 2/8/2018    Procedure: COLONOSCOPY;  Surgeon: Geoff Frank MD;  Location: [facility] ENDOSCOPY;  Service:    • COLONOSCOPY N/A 1/28/2020    Procedure: COLONOSCOPY;  Surgeon: Geoff Frank MD;  Location: [facility] ENDOSCOPY;  Service: Gastroenterology   • ENDOSCOPY N/A 2/8/2018    Procedure: ESOPHAGOGASTRODUODENOSCOPY;  Surgeon: Geoff Frank MD;  Location: [facility] ENDOSCOPY;  Service:    • ENDOSCOPY N/A 1/28/2020    Procedure: ESOPHAGOGASTRODUODENOSCOPY;  Surgeon: Geoff Frank MD;  Location: [facility] ENDOSCOPY;  Service: Gastroenterology   • EYE SURGERY Right    • TUBAL ABDOMINAL LIGATION         Current Meds:    Current Outpatient Medications:   •   busPIRone (BUSPAR) 15 MG tablet, Take 15 mg by mouth 4 (Four) Times a Day., Disp: , Rfl:   •  carBAMazepine (CARBATROL) 300 MG 12 hr capsule, Take 600 mg by mouth Daily., Disp: , Rfl:   •  clonazePAM (KlonoPIN) 0.5 MG tablet, Take 0.5 mg by mouth Daily As Needed., Disp: , Rfl:   •  methocarbamol (ROBAXIN) 500 MG tablet, Take 2 tablets by mouth 4 (Four) Times a Day., Disp: 120 tablet, Rfl: 2  •  QUEtiapine (SEROquel) 300 MG tablet, Take 300 mg by mouth Every Night., Disp: , Rfl:   •  SAPHRIS 2.5 MG sublingual tablet, PLACE 1 UNDER THE TONGUE TWICE DAILY, Disp: , Rfl:   •  traZODone (DESYREL) 150 MG tablet, TAKE 1 TO 1&1 2 TO 2 TABLETS BY MOUTH AT BEDTIME AS DIRECTED AS NEEDED FOR SLEEP, Disp: , Rfl:   •  ferrous sulfate (IRON SUPPLEMENT) 325 (65 FE) MG tablet, Take 1 tablet by mouth Daily With Breakfast., Disp: 30 tablet, Rfl: 4  •  FLUoxetine (PROzac) 20 MG capsule, Take 1 capsule by mouth Daily., Disp: 30 capsule, Rfl: 1  •  gabapentin (NEURONTIN) 100 MG capsule, TK 1 PO TID, Disp: , Rfl:   •  rizatriptan (Maxalt) 5 MG tablet, Take 1 tablet by mouth 1 (One) Time As Needed for Migraine. May repeat in 2 hours if needed, Disp: 13 tablet, Rfl: 0    Allergies:  No Known Allergies    Family Hx:  Family History   Problem Relation Age of Onset   • Arthritis Mother    • Hypertension Mother    • Ovarian cancer Mother    • Breast cancer Maternal Aunt    • Colon cancer Maternal Uncle    • Lung cancer Maternal Uncle    • Heart disease Maternal Grandfather    • Stroke Maternal Grandfather    • Cancer Maternal Grandfather    • Lung cancer Maternal Grandfather    • Lung cancer Maternal Aunt    • Cancer Maternal Grandmother    • Breast cancer Paternal Aunt         Social History:  Social History     Socioeconomic History   • Marital status: Single     Spouse name: Not on file   • Number of children: Not on file   • Years of education: Not on file   • Highest education level: Not on file   Tobacco Use   • Smoking status: Never  "Smoker   • Smokeless tobacco: Never Used   Substance and Sexual Activity   • Alcohol use: No   • Drug use: No   • Sexual activity: Defer       Review of Systems  Review of Systems   Constitutional: Negative for appetite change, diaphoresis, fatigue and fever.   HENT: Negative for ear pain, postnasal drip, rhinorrhea and sore throat.    Eyes: Negative for pain and visual disturbance.   Respiratory: Negative for cough, chest tightness and shortness of breath.    Cardiovascular: Negative for chest pain, palpitations and leg swelling.   Gastrointestinal: Negative for abdominal pain, constipation, diarrhea, nausea and vomiting.   Genitourinary: Negative for dysuria, flank pain, frequency and urgency.   Musculoskeletal: Positive for neck pain. Negative for arthralgias, back pain and myalgias.   Skin: Negative for pallor and rash.   Neurological: Positive for headaches. Negative for dizziness, seizures, syncope, weakness and numbness.   Psychiatric/Behavioral: Negative for agitation, confusion, decreased concentration and dysphoric mood.       Objective:     /78   Pulse 91   Temp 96.9 °F (36.1 °C) (Temporal)   Ht 152.4 cm (60\")   Wt 91.3 kg (201 lb 3 oz)   SpO2 99%   BMI 39.29 kg/m²   Physical Exam  Constitutional:       Appearance: She is well-developed.   HENT:      Head: Normocephalic and atraumatic.      Right Ear: External ear normal.      Left Ear: External ear normal.      Nose: Nose normal.   Eyes:      Conjunctiva/sclera: Conjunctivae normal.      Pupils: Pupils are equal, round, and reactive to light.   Neck:      Musculoskeletal: Normal range of motion and neck supple.      Thyroid: No thyromegaly.      Trachea: No tracheal deviation.   Cardiovascular:      Rate and Rhythm: Normal rate and regular rhythm.      Heart sounds: Normal heart sounds. No murmur. No friction rub. No gallop.    Pulmonary:      Effort: Pulmonary effort is normal. No respiratory distress.      Breath sounds: Normal breath " sounds. No wheezing.   Abdominal:      General: Bowel sounds are normal. There is no distension.      Palpations: Abdomen is soft.      Tenderness: There is no abdominal tenderness. There is no guarding or rebound.   Musculoskeletal: Normal range of motion.   Skin:     General: Skin is warm and dry.      Capillary Refill: Capillary refill takes less than 2 seconds.   Neurological:      Mental Status: She is alert and oriented to person, place, and time.      Sensory: No sensory deficit.      Motor: No abnormal muscle tone.      Deep Tendon Reflexes: Reflexes normal.   Psychiatric:         Behavior: Behavior normal.          Assessment/Plan:     Gosia was seen today for neck pain.    Diagnoses and all orders for this visit:    Neck pain  Chronic, stable, not controlled. Will try PT. Advised using tylenol for time being.   -     Ambulatory Referral to Physical Therapy Evaluate and treat (headaches from neck pain, try dry needling and other modalities as you see fit please. )    Episodic tension-type headache, not intractable  -     Ambulatory Referral to Physical Therapy Evaluate and treat (headaches from neck pain, try dry needling and other modalities as you see fit please. )    Other orders  -     Discontinue: DULoxetine (Cymbalta) 20 MG capsule; Take 1 capsule by mouth Daily.  -     FLUoxetine (PROzac) 20 MG capsule; Take 1 capsule by mouth Daily.  -     rizatriptan (Maxalt) 5 MG tablet; Take 1 tablet by mouth 1 (One) Time As Needed for Migraine. May repeat in 2 hours if needed    Need medical release for psych care.     · Rx changes: n/a  · Patient Education: diet, exercise, stretching  · Compliance at present is estimated to be fair.   · Efforts to improve compliance (if necessary) will be directed at increased exercise.     Follow-up:     No follow-ups on file.    Preventative:  Health Maintenance   Topic Date Due   • ANNUAL PHYSICAL  11/15/1982   • HEPATITIS C SCREENING  12/06/2017   • INFLUENZA VACCINE   08/01/2020   • PAP SMEAR  09/04/2023   • COLONOSCOPY  01/28/2025   • TDAP/TD VACCINES (2 - Td) 10/14/2029   • Pneumococcal Vaccine 0-64  Aged Out     Female Preventative: UTD  Recommended: none  Vaccine Counseling: N/A    Weight  -Class: Obese Class II: 35-39.9kg/m2  -Patient's Body mass index is 39.29 kg/m². BMI is above normal parameters. Recommendations include: exercise counseling and nutrition counseling.   eat more fruits and vegetables, decrease soda or juice intake, increase water intake and increase physical activity    Alcohol use:  reports no history of alcohol use.  Nicotine status  reports that she has never smoked. She has never used smokeless tobacco.    Goals    None         RISK SCORE: 4      Devon Kramer M.D. PGY3  [facility]  [address] [place] [address]  Office: [phone]    This document has been electronically signed by Devon Kramer MD on October 5, 2020 16:27 CDT

## 2020-10-06 ENCOUNTER — HOSPITAL ENCOUNTER (OUTPATIENT)
Dept: PHYSICAL THERAPY | Facility: HOSPITAL | Age: 40
Setting detail: THERAPIES SERIES
Discharge: HOME | End: 2020-10-06

## 2020-10-06 DIAGNOSIS — G44.219 EPISODIC TENSION-TYPE HEADACHE, NOT INTRACTABLE: ICD-10-CM

## 2020-10-06 DIAGNOSIS — M54.2 CERVICAL PAIN (NECK): Primary | ICD-10-CM

## 2020-10-06 PROCEDURE — 97162 PT EVAL MOD COMPLEX 30 MIN: CPT | Performed by: PHYSICAL THERAPIST

## 2020-10-06 NOTE — PROGRESS NOTES
I have seen the patient.  I have reviewed the notes, assessments, and/or procedures performed by Devon Kramer MD, I concur with her/his documentation and assessment and plan for Gosia Brown.               This document has been electronically signed by Rodrick Giron MD on October 6, 2020 11:17 CDT

## 2020-10-06 NOTE — THERAPY EVALUATION
"    Outpatient Physical Therapy Ortho Initial Evaluation  [facility]     Patient Name: Gosia Brown  : 1980  MRN: [id number]  Today's Date: 10/6/2020      Visit Date: 10/06/2020  Attendance:  (authorization required)  Subjective Improvement: n/a  Next MD Appt: 10/23/2020  Recert Date: 10/26/2020    Therapy Diagnosis: Neck pain; headaches         Past Medical History:   Diagnosis Date   • Anemia    • Anxiety    • Arthritis    • Back problem    • Bipolar depression (CMS/HCC)    • Depression    • Diabetes mellitus (CMS/HCC) 2019    On a strict diet so i dont become one. Sugar is staying elev   • GERD (gastroesophageal reflux disease)    • Heart problem    • Heartburn    • PTSD (post-traumatic stress disorder)         Past Surgical History:   Procedure Laterality Date   • COLONOSCOPY N/A 2018    Procedure: COLONOSCOPY;  Surgeon: Geoff Frank MD;  Location: [facility] ENDOSCOPY;  Service:    • COLONOSCOPY N/A 2020    Procedure: COLONOSCOPY;  Surgeon: Geoff Frank MD;  Location: [facility] ENDOSCOPY;  Service: Gastroenterology   • ENDOSCOPY N/A 2018    Procedure: ESOPHAGOGASTRODUODENOSCOPY;  Surgeon: Geoff Frank MD;  Location: [facility] ENDOSCOPY;  Service:    • ENDOSCOPY N/A 2020    Procedure: ESOPHAGOGASTRODUODENOSCOPY;  Surgeon: Geoff Frank MD;  Location: [facility] ENDOSCOPY;  Service: Gastroenterology   • EYE SURGERY Right    • TUBAL ABDOMINAL LIGATION       No Known Allergies    Visit Dx:     ICD-10-CM ICD-9-CM   1. Cervical pain (neck)  M54.2 723.1   2. Episodic tension-type headache, not intractable  G44.219 339.11         Patient History     Row Name 10/06/20 1300             History    Chief Complaint  Headache;Pain;Difficulty with daily activities  -SS      Type of Pain  Neck pain  -SS      Brief Description of Current Complaint  \"My neck is affecting the whole right side of my body.\" Neck pain causes headaches, jaw pain, R shoulder, and back pain. Notes that her " "jaws \"feel tired\" right before her neck begins to hurt. Chronic with worsening over the past 6-8 months. No known cause of onset. When she has a headache, it seems to start in the occiput and radiate over the cranium to the supra-orbital region. Going to bed and \"sleeping it off\" helps decrease her headache. Cervical pain in the paraspinals more than UT region. Has been to Pain Management and received an injection approximately 1 month ago. States the injection made her hurt worse. She was seen in Physical Therapy November 2019 - January 2020 without much improvement. She reports that her headaches and neck pain are both much worse now compared to when she was in therapy previously. Kept her 1-year-old grandson over the weekend. Had to call his mother yesterday to come get him due to pain from lifting and holding him. Decreased pain when extends the cervical spine. Occasional numbness in the right MF & RF and 2nd and 3rd toes when she simultaneously rotates and sidebends to the right and when looking down a lot. Reports urinary incontinence issues. Single female with children.   -SS      Patient/Caregiver Goals  Relieve pain;Return to prior level of function \"To be able to do normal activities again, and not be in so much pain.\"  -SS      Current Tobacco Use  no  -SS      Smoking Status  never  -SS      Patient's Rating of General Health  Good  -SS      Hand Dominance  right-handed  -SS      Occupation/sports/leisure activities  Unemployed. Hobbies: \"I keep my grandson when I can.\"  -      What clinical tests have you had for this problem?  X-ray;MRI  -      Results of Clinical Tests  see EMR  -         Pain     Pain Location  Head;Neck  -      Pain at Present  5  -SS      Pain at Best  5 over past 1 month  -SS      Pain at Worst  10 over past 1 month  -SS      Pain Frequency  Constant/continuous  -      Pain Description  Sharp;Throbbing  -      What Performance Factors Make the Current Problem(s) WORSE? "  see above  -SS      What Performance Factors Make the Current Problem(s) BETTER?  see above  -SS      Is your sleep disturbed?  Yes  -SS      Is medication used to assist with sleep?  Yes  -SS      Difficulties at work?  n/a  -SS      Difficulties with ADL's?  pain  -SS      Difficulties with recreational activities?  pain, decreased ability   -SS         Fall Risk Assessment    Any falls in the past year:  Yes  -SS      Number of falls reported in the last 12 months  1  -SS      Factors that contributed to the fall:  -- looked down and got dizzy  -SS      Does patient have a fear of falling  No  -SS         Daily Activities    Primary Language  English  -SS         Safety    Are you being hurt, hit, or frightened by anyone at home or in your life?  No  -SS      Are you being neglected by a caregiver  No  -SS        User Key  (r) = Recorded By, (t) = Taken By, (c) = Cosigned By    Initials Name Provider Type    SS Brijesh Thornton, PT DPT Physical Therapist          PT Ortho     Row Name 10/06/20 1300       Subjective Comments    Subjective Comments  see Therapy Patient History  -SS       Precautions and Contraindications    Precautions/Limitations  no known precautions/limitations  -SS       Subjective Pain    Able to rate subjective pain?  yes  -SS    Pre-Treatment Pain Level  5  -SS    Post-Treatment Pain Level  5  -SS       Posture/Observations    Posture/Observations Comments  Decreased cervical lordosis. R scapula is elevated and protracted compared to L. B rounded shoulders.  -SS       Myotomal Screen- Upper Quarter Clearing    Shoulder flexion (C5)  Left:;5 (Normal);Right:;4 (Good)  -SS    Elbow flexion/wrist extension (C6)  Left:;5 (Normal);Right:;4 (Good)  -SS    Elbow extension/wrist flexion (C7)  Left:;5 (Normal);Right:;4 (Good)  -SS    Finger flexion/ (C8)  Left:;5 (Normal);Right:;4 (Good)  -SS    Finger abduction (T1)  Left:;5 (Normal);Right:;4 (Good)  -SS       Myotomal Screen- Lower  Quarter Clearing    Hip flexion (L2)  Right:;4+ (Good +);Left:;5 (Normal)  -SS    Knee extension (L3)  Bilateral:;5 (Normal)  -SS    Ankle DF (L4)  Bilateral:;5 (Normal)  -SS    Knee flexion (S2)  Bilateral:;5 (Normal)  -SS       Cervical/Thoracic Special Tests    Cervical/Thoracic Special Tests Comments  Decreased symptoms with manual cervical distraction. No change in symptoms with manual cervical compression. Palpation of upper cervical paraspinals reproduces tingling in R UE that increases with longer duration of pressure onto paraspinal, but tingling decreases when manual distraction of cervical spine or relative cervical extension is introduced when maintaining pressure on paraspinals. Increased tone R cervical paraspinals, suboccipital muscles, and UT. Questionable trigger point R UT and cervical paraspinals. Sore with palpation of L UT. Spurling's test with R rotation and lateral flexion relieves symptoms. Upper limb neural tension tests 1 > 2 cause shooting pain into R UE that worsens with R cervical lateral flexion and lessens with L cervical lateral flexion. Full UE ROM and no muscle guarding noted during ULNTT 1 & 2 to the right. ULNTT 1 & 2 are negative to the left. TTP R lower cervical spine.  -SS       Head/Neck/Trunk    Neck Extension AROM  55 deg; decreased pain  -SS    Neck Flexion AROM  25 deg; increased pain  -SS    Neck Lt Lateral Flexion AROM  28 deg  -SS    Neck Rt Lateral Flexion AROM  37 deg  -SS    Neck Lt Rotation AROM  53 deg  -SS    Neck Rt Rotation AROM  48 deg  -SS      User Key  (r) = Recorded By, (t) = Taken By, (c) = Cosigned By    Initials Name Provider Type    Brijesh Mahoney, PT DPT Physical Therapist          Therapy Education  Education Details: therapy plan  How Provided: Verbal  Provided to: Patient  Level of Understanding: Verbalized     PT OP Goals     Row Name 10/06/20 1300          PT Short Term Goals    STG Date to Achieve  10/27/20  -SS     STG 1  Note a >/=  25% subjective improvement.  -     STG 2  Increase cervical extension to >/= 30 deg.  -     STG 3  Increase B cervical rotation by >/= 10 deg each.  -        Long Term Goals    LTG Date to Achieve  11/17/20  -     LTG 1  Independent with HEP/self-management.  -     LTG 2  CROM WNLs without pain.  -     LTG 3  Minimal headaches.  -     LTG 4  Resume PLOF.  -        Time Calculation    PT Goal Re-Cert Due Date  10/27/20  -       User Key  (r) = Recorded By, (t) = Taken By, (c) = Cosigned By    Initials Name Provider Type     Brijesh Thornton, PT DPT Physical Therapist          PT Assessment/Plan     Row Name 10/06/20 1300          PT Assessment    Functional Limitations  Limitation in home management;Limitations in community activities;Limitations in functional capacity and performance;Performance in leisure activities;Performance in self-care ADL  -     Impairments  Pain;Sensation;Muscle strength  -     Assessment Comments  Patient is complex with at times confounding complaints when taken in isolation. I think some of her relief in radiculopathy with cervical extension and right lateral flexion may be partly in response to an improvement in her cervical lordosis compared to resting position and relaxation of paraspinals. She has trigger points in the right cervical paraspinals. I have some concern regarding cord compression, even though MRI not that remarkable, given that she has R UE and LE radicular complaints and complaints of incontinence that may or may not be related.   -     Rehab Potential  Good barrier: progressive worsening of symptoms  -     Patient/caregiver participated in establishment of treatment plan and goals  Yes  -     Patient would benefit from skilled therapy intervention  Yes  -SS        PT Plan    PT Frequency  2x/week  -     Predicted Duration of Therapy Intervention   4-6 weeks  -     Planned CPT's?  PT EVAL MOD COMPLEXITY: 17479;PT THER PROC EA 15  MIN: 69618;PT MANUAL THERAPY EA 15 MIN: 97241;PT HOT OR COLD PACK TREAT MCARE;PT ELECTRICAL STIM UNATTEND: ;PT ULTRASOUND EA 15 MIN: 57112  -SS     PT Plan Comments  MFR/STM cervical paraspinals, cervical joint mobilization, cervical deep flexor strengthening, US to R cervical paraspinals, dry needling to cervical paraspinals, IFC estim with MHP as needed for pain.  -SS       User Key  (r) = Recorded By, (t) = Taken By, (c) = Cosigned By    Initials Name Provider Type    SS Brijesh Thornton, PT DPT Physical Therapist            Time Calculation:     Start Time: 1304  Stop Time: 1351  Time Calculation (min): 47 min     Therapy Charges for Today     Code Description Service Date Service Provider Modifiers Qty    33587702618 HC PT EVAL MOD COMPLEXITY 3 10/6/2020 Brijesh Thornton, PT DPT GP 1                    Brijesh Thornton, PT, DPT, CHT  10/6/2020

## 2020-10-07 DIAGNOSIS — G89.29 CHRONIC NECK PAIN: ICD-10-CM

## 2020-10-07 DIAGNOSIS — M54.2 CHRONIC NECK PAIN: ICD-10-CM

## 2020-10-07 DIAGNOSIS — M99.79 FORAMINAL STENOSIS DUE TO INTERVERTEBRAL DISC DISEASE: ICD-10-CM

## 2020-10-07 DIAGNOSIS — M48.00 CENTRAL STENOSIS OF SPINAL CANAL: Primary | ICD-10-CM

## 2020-10-07 DIAGNOSIS — M54.2 CERVICAL PAIN (NECK): ICD-10-CM

## 2020-10-07 DIAGNOSIS — M51.9 FORAMINAL STENOSIS DUE TO INTERVERTEBRAL DISC DISEASE: ICD-10-CM

## 2020-10-13 ENCOUNTER — HOSPITAL ENCOUNTER (OUTPATIENT)
Dept: PHYSICAL THERAPY | Facility: HOSPITAL | Age: 40
Setting detail: THERAPIES SERIES
Discharge: HOME | End: 2020-10-13

## 2020-10-13 DIAGNOSIS — M54.2 CERVICAL PAIN (NECK): Primary | ICD-10-CM

## 2020-10-13 PROCEDURE — 97110 THERAPEUTIC EXERCISES: CPT

## 2020-10-13 PROCEDURE — 97035 APP MDLTY 1+ULTRASOUND EA 15: CPT

## 2020-10-13 PROCEDURE — G0283 ELEC STIM OTHER THAN WOUND: HCPCS

## 2020-10-13 NOTE — THERAPY TREATMENT NOTE
Outpatient Physical Therapy Ortho Treatment Note  [facility]     Patient Name: Gosia Brown  : 1980  MRN: [id number]  Today's Date: 10/13/2020      Visit Date: 10/13/2020    Subjective Improvement 0  Visits 2/2  Visits approved 6 from 10- to 2020  RTMD 10-  Recert Date 10-    Neck pain and headaches    Visit Dx:    ICD-10-CM ICD-9-CM   1. Cervical pain (neck)  M54.2 723.1       Patient Active Problem List   Diagnosis   • Blood in stool   • Nausea   • Bloating   • Generalized abdominal pain   • Change in bowel habits   • Right knee pain   • Internal derangement of right knee   • Abnormal uterine bleeding   • Family history of breast cancer   • Family history of ovarian cancer   • Menorrhagia   • PAT (paroxysmal atrial tachycardia) (CMS/[facility])   • Uterine fibroid   • Vasovagal syncope   • Chronic neck pain   • Morbidly obese (CMS/[facility])   • Hypertension   • Epigastric pain   • Helicobacter pylori gastritis        Past Medical History:   Diagnosis Date   • Anemia    • Anxiety    • Arthritis    • Back problem    • Bipolar depression (CMS/[facility])    • Depression    • Diabetes mellitus (CMS/[facility]) 2019    On a strict diet so i dont become one. Sugar is staying elev   • GERD (gastroesophageal reflux disease)    • Heart problem    • Heartburn    • PTSD (post-traumatic stress disorder)         Past Surgical History:   Procedure Laterality Date   • COLONOSCOPY N/A 2018    Procedure: COLONOSCOPY;  Surgeon: Geoff Frank MD;  Location: [facility] ENDOSCOPY;  Service:    • COLONOSCOPY N/A 2020    Procedure: COLONOSCOPY;  Surgeon: Geoff Frank MD;  Location: [facility] ENDOSCOPY;  Service: Gastroenterology   • ENDOSCOPY N/A 2018    Procedure: ESOPHAGOGASTRODUODENOSCOPY;  Surgeon: Geoff Frank MD;  Location: [facility] ENDOSCOPY;  Service:    • ENDOSCOPY N/A 2020    Procedure: ESOPHAGOGASTRODUODENOSCOPY;  Surgeon: Geoff Frank MD;  Location: [facility] ENDOSCOPY;   Service: Gastroenterology   • EYE SURGERY Right    • TUBAL ABDOMINAL LIGATION         PT Ortho     Row Name 10/13/20 0800       Subjective Comments    Subjective Comments  Patient states that she has to do therapy in order to see a neuro MD.  She reports caring for her grandson over the weekend.    -CP       Subjective Pain    Able to rate subjective pain?  yes  -CP    Pre-Treatment Pain Level  5  -CP      User Key  (r) = Recorded By, (t) = Taken By, (c) = Cosigned By    Initials Name Provider Type    Paola Gr PTA Physical Therapy Assistant                      PT Assessment/Plan     Row Name 10/13/20 0853          PT Assessment    Assessment Comments  No change in pain after modalities  -CP        PT Plan    PT Frequency  2x/week  -CP     PT Plan Comments  4-6 weeks.  MFR next  -CP       User Key  (r) = Recorded By, (t) = Taken By, (c) = Cosigned By    Initials Name Provider Type    Paola Gr PTA Physical Therapy Assistant          Modalities     Row Name 10/13/20 0800             Moist Heat    MH Applied  Yes  -CP      Location  cspine with IFC  -CP      Rx Minutes  10 mins  -CP      MH Prior to Rx  Yes  -CP      MH S/P Rx  Yes  -CP         Ultrasound 84902    Location  right cspine paraspinals  -CP      Duty Cycle  100  -CP      Frequency  1.0 MHz  -CP      Intensity - Wts/cm  1.5  -CP      71779 - PT Ultrasound Minutes  8  -CP         ELECTRICAL STIMULATION    Attended/Unattended  Unattended  -CP      Stimulation Type  IFC  -CP      Location/Electrode Placement/Other  bilateral cspine paraspinals  -CP       PT E-Stim Unattended (Manual) Minutes  15  -CP        User Key  (r) = Recorded By, (t) = Taken By, (c) = Cosigned By    Initials Name Provider Type    Paola Gr PTA Physical Therapy Assistant        OP Exercises     Row Name 10/13/20 0800             Subjective Comments    Subjective Comments  Patient states that she has to do therapy in order to see a neuro MD.  She  reports caring for her grandson over the weekend.    -CP         Subjective Pain    Able to rate subjective pain?  yes  -CP      Pre-Treatment Pain Level  5  -CP         Exercise 1    Exercise Name 1  MHP to cspine  -CP      Time 1  10  -CP         Exercise 2    Exercise Name 2  seated chintucks with manual over pressure  -CP      Sets 2  2  -CP      Reps 2  5  -CP         Exercise 3    Exercise Name 3  seated chin tucks with self OP  -CP      Sets 3  2  -CP      Reps 3  5  -CP         Exercise 4    Exercise Name 4  seated UT stretch  -CP      Cueing 4  Verbal  -CP      Sets 4  3  -CP      Time 4  30  -CP      Additional Comments  bilateral  -CP         Exercise 5    Exercise Name 5  doorway stretch  -CP      Cueing 5  Verbal;Demo  -CP      Sets 5  3  -CP      Time 5  30  -CP         Exercise 6    Exercise Name 6  US see modalities  -CP        User Key  (r) = Recorded By, (t) = Taken By, (c) = Cosigned By    Initials Name Provider Type    Paola Gr, PTA Physical Therapy Assistant                       PT OP Goals     Row Name 10/13/20 0800          PT Short Term Goals    STG Date to Achieve  10/27/20  -CP     STG 1  Note a >/= 25% subjective improvement.  -CP     STG 1 Progress  Not Met  -CP     STG 2  Increase cervical extension to >/= 30 deg.  -CP     STG 2 Progress  Progressing  -CP     STG 3  Increase B cervical rotation by >/= 10 deg each.  -CP     STG 3 Progress  Progressing  -CP        Long Term Goals    LTG Date to Achieve  11/17/20  -CP     LTG 1  Independent with HEP/self-management.  -CP     LTG 1 Progress  Ongoing  -CP     LTG 2  CROM WNLs without pain.  -CP     LTG 2 Progress  Progressing  -CP     LTG 3  Minimal headaches.  -CP     LTG 3 Progress  Not Met  -CP     LTG 4  Resume PLOF.  -CP     LTG 4 Progress  Not Met  -CP       User Key  (r) = Recorded By, (t) = Taken By, (c) = Cosigned By    Initials Name Provider Type    Paola Gr, PTA Physical Therapy Assistant          Therapy  Education  Education Details: UT stretch, seated chintucks of self OP, doorway stretch  Given: HEP  Program: New  How Provided: Verbal, Demonstration, Written  Provided to: Patient  Level of Understanding: Teach back education performed, Verbalized, Demonstrated              Time Calculation:   Start Time: 0800  Stop Time: 0900  Time Calculation (min): 60 min  Total Timed Code Minutes- PT: 30 minute(s)  Therapy Charges for Today     Code Description Service Date Service Provider Modifiers Qty    37973806790 HC PT THER SUPP EA 15 MIN 10/13/2020 Paola Moody, PTA GP 1    41126116091 HC PT ELECTRICAL STIM UNATTENDED 10/13/2020 Paola Moody, PTA  1    06490488670 HC PT THER PROC EA 15 MIN 10/13/2020 Paola Moody, PTA GP 1    86813234442 HC PT ULTRASOUND EA 15 MIN 10/13/2020 Paola Moody, PTA GP 1                    Paola Moody PTA  10/13/2020

## 2020-10-20 ENCOUNTER — APPOINTMENT (OUTPATIENT)
Dept: PHYSICAL THERAPY | Facility: HOSPITAL | Age: 40
End: 2020-10-20

## 2020-10-20 ENCOUNTER — TELEPHONE (OUTPATIENT)
Dept: FAMILY MEDICINE CLINIC | Facility: CLINIC | Age: 40
End: 2020-10-20

## 2020-10-20 NOTE — TELEPHONE ENCOUNTER
Patient called needing a refill for     acyclovir (ZOVIRAX) 400 MG tablet     Please send to     [facility] - [place] [address] - [phone]  - [phone]   Phone:  [phone]   Fax:  [phone]     Thank you     [phone]

## 2020-10-21 ENCOUNTER — PATIENT OUTREACH (OUTPATIENT)
Dept: FAMILY MEDICINE CLINIC | Facility: CLINIC | Age: 40
End: 2020-10-21

## 2020-10-21 ENCOUNTER — TELEPHONE (OUTPATIENT)
Dept: FAMILY MEDICINE CLINIC | Facility: CLINIC | Age: 40
End: 2020-10-21

## 2020-10-21 RX ORDER — VALACYCLOVIR HYDROCHLORIDE 1 G/1
1000 TABLET, FILM COATED ORAL DAILY
Qty: 5 TABLET | Refills: 0 | Status: SHIPPED | OUTPATIENT
Start: 2020-10-21 | End: 2021-08-06

## 2020-10-21 NOTE — TELEPHONE ENCOUNTER
"Follow-up to patient request for Acyclovir -   Following message sent to Dr. Kramer on 10/21/20:    Called patient today. She stated she needs the Acyclovir for another flare up of fever blisters stemming from nausea and stomach upset caused by new pain med given to her by Pain Management ([place]). Patient said she did contact Pain Management with this problem with taking the Norco and was told that is just a common mild side effect. Patient said the Norco makes her pain \"tolerable\" therefore she has continued to take the Norco. I asked if possibly Zofran would help with the nausea and stomach upset. She agreed she has taken Zofran before and would like a script for either the oral tablets of sublingual tabs if you are willing to give her that.       LILI Antony MA.    10/21/20  "

## 2020-10-21 NOTE — OUTREACH NOTE
Request from PCP to send in valacyclovir 1g x 5 days.  Patient is medicaid, will not be covered since Dr. Kramer is not enrolled in medicaid.  Spoke to attending Dr. Giron, he said it was fine to send in under his name.  Will call patient    Time spent, 4 minutes

## 2020-10-22 ENCOUNTER — APPOINTMENT (OUTPATIENT)
Dept: PHYSICAL THERAPY | Facility: HOSPITAL | Age: 40
End: 2020-10-22

## 2020-10-23 ENCOUNTER — OFFICE VISIT (OUTPATIENT)
Dept: FAMILY MEDICINE CLINIC | Facility: CLINIC | Age: 40
End: 2020-10-23

## 2020-10-23 VITALS — BODY MASS INDEX: 39.65 KG/M2 | WEIGHT: 203 LBS

## 2020-10-23 DIAGNOSIS — M54.2 NECK PAIN: Primary | ICD-10-CM

## 2020-10-23 DIAGNOSIS — F33.41 RECURRENT MAJOR DEPRESSIVE DISORDER, IN PARTIAL REMISSION (HCC): ICD-10-CM

## 2020-10-23 DIAGNOSIS — F41.1 GAD (GENERALIZED ANXIETY DISORDER): ICD-10-CM

## 2020-10-23 DIAGNOSIS — F43.10 PTSD (POST-TRAUMATIC STRESS DISORDER): ICD-10-CM

## 2020-10-23 PROCEDURE — 99441 PR PHYS/QHP TELEPHONE EVALUATION 5-10 MIN: CPT | Performed by: STUDENT IN AN ORGANIZED HEALTH CARE EDUCATION/TRAINING PROGRAM

## 2020-10-23 RX ORDER — MELOXICAM 15 MG/1
15 TABLET ORAL DAILY
Qty: 30 TABLET | Refills: 0 | Status: SHIPPED | OUTPATIENT
Start: 2020-10-23 | End: 2020-11-16 | Stop reason: SDUPTHER

## 2020-10-23 NOTE — PROGRESS NOTES
Family Medicine Residency  Devon Kramer MD  You have chosen to receive care through a telephone visit. Do you consent to use a telephone visit for your medical care today? Yes    Subjective:     Gosia Brown is a 39 y.o. female who presents for follow up neck pain.    Recently had cold sore outbreak and e-scribed Valacyclovir. Improving.     Neck pain persists. Sharp, 6/10, constant, localized. Minimal alleviation with meds and rest. Aggravated with movement. Still going to PT. Previously sent to neurosurgery for MRI findings of mild/moderate central stenosis and foraminal stenosis, however she reports not offered any treatment.    She would like a new referral to psychiatry for management as she wants a second opinion.     No other complaints.     The following portions of the patient's history were reviewed and updated as appropriate: allergies, current medications, past family history, past medical history, past social history, past surgical history and problem list.    Past Medical Hx:  Past Medical History:   Diagnosis Date   • Anemia    • Anxiety    • Arthritis    • Back problem    • Bipolar depression (CMS/[facility])    • Depression    • Diabetes mellitus (CMS/[facility]) 2019    On a strict diet so i dont become one. Sugar is staying elev   • GERD (gastroesophageal reflux disease)    • Heart problem    • Heartburn    • PTSD (post-traumatic stress disorder)        Past Surgical Hx:  Past Surgical History:   Procedure Laterality Date   • COLONOSCOPY N/A 2/8/2018    Procedure: COLONOSCOPY;  Surgeon: Geoff Frank MD;  Location: [facility] ENDOSCOPY;  Service:    • COLONOSCOPY N/A 1/28/2020    Procedure: COLONOSCOPY;  Surgeon: Geoff Frank MD;  Location: [facility] ENDOSCOPY;  Service: Gastroenterology   • ENDOSCOPY N/A 2/8/2018    Procedure: ESOPHAGOGASTRODUODENOSCOPY;  Surgeon: Geoff Frank MD;  Location: [facility] ENDOSCOPY;  Service:    • ENDOSCOPY N/A 1/28/2020    Procedure: ESOPHAGOGASTRODUODENOSCOPY;   Surgeon: Geoff Frank MD;  Location: [facility] ENDOSCOPY;  Service: Gastroenterology   • EYE SURGERY Right    • TUBAL ABDOMINAL LIGATION         Current Meds:    Current Outpatient Medications:   •  busPIRone (BUSPAR) 15 MG tablet, Take 15 mg by mouth 4 (Four) Times a Day., Disp: , Rfl:   •  carBAMazepine (CARBATROL) 300 MG 12 hr capsule, Take 600 mg by mouth Daily., Disp: , Rfl:   •  clonazePAM (KlonoPIN) 0.5 MG tablet, Take 0.5 mg by mouth Daily As Needed., Disp: , Rfl:   •  ferrous sulfate (IRON SUPPLEMENT) 325 (65 FE) MG tablet, Take 1 tablet by mouth Daily With Breakfast., Disp: 30 tablet, Rfl: 4  •  FLUoxetine (PROzac) 20 MG capsule, Take 1 capsule by mouth Daily., Disp: 30 capsule, Rfl: 1  •  gabapentin (NEURONTIN) 100 MG capsule, TK 1 PO TID, Disp: , Rfl:   •  methocarbamol (ROBAXIN) 500 MG tablet, Take 2 tablets by mouth 4 (Four) Times a Day., Disp: 120 tablet, Rfl: 2  •  QUEtiapine (SEROquel) 300 MG tablet, Take 300 mg by mouth Every Night., Disp: , Rfl:   •  rizatriptan (Maxalt) 5 MG tablet, Take 1 tablet by mouth 1 (One) Time As Needed for Migraine. May repeat in 2 hours if needed, Disp: 13 tablet, Rfl: 0  •  SAPHRIS 2.5 MG sublingual tablet, PLACE 1 UNDER THE TONGUE TWICE DAILY, Disp: , Rfl:   •  traZODone (DESYREL) 150 MG tablet, TAKE 1 TO 1&1 2 TO 2 TABLETS BY MOUTH AT BEDTIME AS DIRECTED AS NEEDED FOR SLEEP, Disp: , Rfl:   •  valACYclovir (Valtrex) 1000 MG tablet, Take 1 tablet by mouth Daily., Disp: 5 tablet, Rfl: 0  •  meloxicam (Mobic) 15 MG tablet, Take 1 tablet by mouth Daily., Disp: 30 tablet, Rfl: 0    Allergies:  No Known Allergies    Family Hx:  Family History   Problem Relation Age of Onset   • Arthritis Mother    • Hypertension Mother    • Ovarian cancer Mother    • Breast cancer Maternal Aunt    • Colon cancer Maternal Uncle    • Lung cancer Maternal Uncle    • Heart disease Maternal Grandfather    • Stroke Maternal Grandfather    • Cancer Maternal Grandfather    • Lung cancer Maternal  Grandfather    • Lung cancer Maternal Aunt    • Cancer Maternal Grandmother    • Breast cancer Paternal Aunt         Social History:  Social History     Socioeconomic History   • Marital status: Single     Spouse name: Not on file   • Number of children: Not on file   • Years of education: Not on file   • Highest education level: Not on file   Tobacco Use   • Smoking status: Never Smoker   • Smokeless tobacco: Never Used   Substance and Sexual Activity   • Alcohol use: No   • Drug use: No   • Sexual activity: Defer       Review of Systems  Review of Systems   Constitutional: Negative for appetite change, diaphoresis, fatigue and fever.   HENT: Negative for ear pain, postnasal drip, rhinorrhea and sore throat.    Eyes: Negative for pain and visual disturbance.   Respiratory: Negative for cough, chest tightness and shortness of breath.    Cardiovascular: Negative for chest pain, palpitations and leg swelling.   Gastrointestinal: Negative for abdominal pain, constipation, diarrhea, nausea and vomiting.   Genitourinary: Negative for dysuria, flank pain, frequency and urgency.   Musculoskeletal: Positive for neck pain. Negative for arthralgias, back pain and myalgias.   Skin: Negative for pallor and rash.   Neurological: Positive for headaches. Negative for dizziness, seizures, syncope, weakness and numbness.   Psychiatric/Behavioral: Negative for agitation, confusion, decreased concentration and dysphoric mood.       Objective:     Wt 92.1 kg (203 lb)   BMI 39.65 kg/m²   Physical Exam     Assessment/Plan:     Diagnoses and all orders for this visit:    1. Neck pain (Primary)  Stable, not controlled. On norco 5 from chronic pain management as well as gabapentin 300 mg tid. Will give mobic to take intermittently and not on continuous basis. To follow up once shes seen second opinion neuro surg.   -     meloxicam (Mobic) 15 MG tablet; Take 1 tablet by mouth Daily.  Dispense: 30 tablet; Refill: 0    2. TERESA (generalized  anxiety disorder)  Stable, controlled. Wants to second opinion  -     Ambulatory Referral to Psychiatry    3. Recurrent major depressive disorder, in partial remission (CMS/HCC)  As #2  -     Ambulatory Referral to Psychiatry    4. PTSD (post-traumatic stress disorder)  As #2  -     Ambulatory Referral to Psychiatry        · Rx changes: n/a  · Patient Education: diet, exercise, stretching  · Compliance at present is estimated to be fair.   · Efforts to improve compliance (if necessary) will be directed at increased exercise.     Follow-up:     Return in about 3 months (around 1/23/2021).    Preventative:  Health Maintenance   Topic Date Due   • ANNUAL PHYSICAL  11/15/1982   • HEPATITIS C SCREENING  12/06/2017   • INFLUENZA VACCINE  08/01/2020   • PAP SMEAR  09/04/2023   • COLONOSCOPY  01/28/2025   • TDAP/TD VACCINES (2 - Td) 10/14/2029   • Pneumococcal Vaccine 0-64  Aged Out     Female Preventative: UTD  Recommended: none  Vaccine Counseling: N/A    Weight  -Class: Obese Class II: 35-39.9kg/m2  -Patient's Body mass index is 39.65 kg/m². BMI is above normal parameters. Recommendations include: exercise counseling and nutrition counseling.   eat more fruits and vegetables, decrease soda or juice intake, increase water intake and increase physical activity    Alcohol use:  reports no history of alcohol use.  Nicotine status  reports that she has never smoked. She has never used smokeless tobacco.    Goals    None         RISK SCORE: 4      Devon Kramer M.D. PGY3  [facility]  [address] [place] [address]  Office: [phone]    This document has been electronically signed by Devon Kramer MD on October 23, 2020 14:55 CDT    TIME: 9 minutes

## 2020-10-30 ENCOUNTER — APPOINTMENT (OUTPATIENT)
Dept: PHYSICAL THERAPY | Facility: HOSPITAL | Age: 40
End: 2020-10-30

## 2020-11-03 ENCOUNTER — APPOINTMENT (OUTPATIENT)
Dept: PHYSICAL THERAPY | Facility: HOSPITAL | Age: 40
End: 2020-11-03

## 2020-11-05 ENCOUNTER — APPOINTMENT (OUTPATIENT)
Dept: PHYSICAL THERAPY | Facility: HOSPITAL | Age: 40
End: 2020-11-05

## 2020-11-12 ENCOUNTER — TELEPHONE (OUTPATIENT)
Dept: FAMILY MEDICINE CLINIC | Facility: CLINIC | Age: 40
End: 2020-11-12

## 2020-11-12 NOTE — TELEPHONE ENCOUNTER
I called patient, she said the pain has not stopped and got worse when she lays down to sleep at night. She noted being told to report any changes at her at last appointment. She said She wakes up with the Right arm/finger numbness which takes about 30 minutes to subside. She said she knows to go to the E/R if she feels its anything serious. Told patient I would send Dr. Kramer a message regarding the changes and to not hesitate to call us back if needed. Patient was in agreement.     LILI Antony MA.    11/12/20

## 2020-11-12 NOTE — TELEPHONE ENCOUNTER
Patient called advised she is having numbness in her right arm when she wakes up, and them throughout the day she has numbness in her fingers. She is unable to  and hold things. She drops anything that she picks up.     She needs Dr Kramer or an MA to call her @ [phone]    Thank you

## 2020-11-16 DIAGNOSIS — M54.2 NECK PAIN: ICD-10-CM

## 2020-11-16 RX ORDER — MELOXICAM 15 MG/1
15 TABLET ORAL DAILY
Qty: 30 TABLET | Refills: 0 | Status: SHIPPED | OUTPATIENT
Start: 2020-11-16 | End: 2021-01-21 | Stop reason: SDUPTHER

## 2020-11-16 RX ORDER — METHOCARBAMOL 500 MG/1
1000 TABLET, FILM COATED ORAL 4 TIMES DAILY
Qty: 120 TABLET | Refills: 2 | Status: SHIPPED | OUTPATIENT
Start: 2020-11-16 | End: 2021-04-01 | Stop reason: SDUPTHER

## 2020-11-16 NOTE — TELEPHONE ENCOUNTER
PATIENT CALLED AND IS NEEDING REFILLS ON HER methocarbamol (ROBAXIN) 500 MG tablet, meloxicam (Mobic) 15 MG tablet AND WOULD LIKE FOR IT TO BE SENT TO [facility]. HER NUMBER [phone].        THANK YOU,      ALEXIA

## 2020-11-18 RX ORDER — RIZATRIPTAN BENZOATE 5 MG/1
5 TABLET ORAL ONCE AS NEEDED
Qty: 13 TABLET | Refills: 0 | Status: SHIPPED | OUTPATIENT
Start: 2020-11-18 | End: 2021-08-06

## 2020-11-18 NOTE — TELEPHONE ENCOUNTER
PATIENT CALLED AND IS NEEDING A REFILL ON HER rizatriptan (Maxalt) 5 MG tablet AND NEEDS TO BE SENT TO [facility]. HER NUMBER [phone].        THANK YOU,      ALEXIA

## 2020-11-23 ENCOUNTER — TELEPHONE (OUTPATIENT)
Dept: FAMILY MEDICINE CLINIC | Facility: CLINIC | Age: 40
End: 2020-11-23

## 2020-11-23 NOTE — TELEPHONE ENCOUNTER
PATIENT CALLED AND WENT TO THE CARE CENTER AND THEY GAVE HER TWO SHOTS AND SOME ANTIBIOTICS TO TAKE AT HOME. SHE SAYS IT LOOKS WORSE AND IS RAW. THERE IS YELLOW AROUND IT AND LOOKS LIKE A HOLE IN THE MIDDLE. SHE IS AT WORK AND IS WORKING DOUBLE SHIFT SO SHE IS NOT ABLE TO COME TO APPOINTMENT THAT I OFFERED. CAN SOMEONE CALL HER BACK PLEASE [phone].        THANK YOU,        ALEXIA

## 2021-01-21 ENCOUNTER — OFFICE VISIT (OUTPATIENT)
Dept: FAMILY MEDICINE CLINIC | Facility: CLINIC | Age: 41
End: 2021-01-21

## 2021-01-21 DIAGNOSIS — R53.1 PARESTHESIAS WITH SUBJECTIVE WEAKNESS: ICD-10-CM

## 2021-01-21 DIAGNOSIS — R20.2 PARESTHESIAS WITH SUBJECTIVE WEAKNESS: ICD-10-CM

## 2021-01-21 DIAGNOSIS — M54.2 CHRONIC NECK PAIN: Primary | ICD-10-CM

## 2021-01-21 DIAGNOSIS — G89.29 CHRONIC NECK PAIN: Primary | ICD-10-CM

## 2021-01-21 DIAGNOSIS — M48.00 CENTRAL STENOSIS OF SPINAL CANAL: ICD-10-CM

## 2021-01-21 PROCEDURE — 99441 PR PHYS/QHP TELEPHONE EVALUATION 5-10 MIN: CPT | Performed by: STUDENT IN AN ORGANIZED HEALTH CARE EDUCATION/TRAINING PROGRAM

## 2021-01-21 RX ORDER — DULOXETIN HYDROCHLORIDE 20 MG/1
CAPSULE, DELAYED RELEASE ORAL
COMMUNITY
Start: 2020-10-21 | End: 2021-08-06

## 2021-01-21 RX ORDER — MELOXICAM 15 MG/1
15 TABLET ORAL DAILY
Qty: 30 TABLET | Refills: 0 | Status: SHIPPED | OUTPATIENT
Start: 2021-01-21 | End: 2021-04-01 | Stop reason: SDUPTHER

## 2021-01-21 RX ORDER — GABAPENTIN 300 MG/1
600 CAPSULE ORAL 3 TIMES DAILY
COMMUNITY
Start: 2020-12-15 | End: 2023-01-30

## 2021-02-01 NOTE — PROGRESS NOTES
I was present onsite throughout the encounter and spoke at length with Dr. Stiles    Regarding the patient's plan of care.  I also spoke with the patient by telephone.  I have reviewed the notes, assessments, and/or procedures performed by Devon Kramer MD, I concur with her/his documentation and assessment and plan for Gosia Brown.                This document has been electronically signed by Rodrick Giron MD on February 1, 2021 10:53 CST

## 2021-04-01 ENCOUNTER — PATIENT MESSAGE (OUTPATIENT)
Dept: FAMILY MEDICINE CLINIC | Facility: CLINIC | Age: 41
End: 2021-04-01

## 2021-04-01 DIAGNOSIS — M54.2 CHRONIC NECK PAIN: ICD-10-CM

## 2021-04-01 DIAGNOSIS — G89.29 CHRONIC NECK PAIN: ICD-10-CM

## 2021-04-01 RX ORDER — MELOXICAM 15 MG/1
15 TABLET ORAL DAILY
Qty: 30 TABLET | Refills: 0 | Status: SHIPPED | OUTPATIENT
Start: 2021-04-01 | End: 2023-01-10

## 2021-04-01 RX ORDER — METHOCARBAMOL 500 MG/1
1500 TABLET, FILM COATED ORAL 4 TIMES DAILY
Qty: 120 TABLET | Refills: 2 | Status: SHIPPED | OUTPATIENT
Start: 2021-04-01 | End: 2023-01-30

## 2021-04-01 NOTE — TELEPHONE ENCOUNTER
Claudia Cruz MA 4/1/2021 3:21 PM CDT      ----- Message -----  From: Gosia Brown  Sent: 4/1/2021 3:05 PM CDT  To: Cheko  Resident [facility] [place]  Subject: Prescription Question     I need to get refills for my robaxin and Meloxacam if anyway possible. And could you adjust my robaxin to a higher mg or more a day?

## 2021-04-19 ENCOUNTER — TELEPHONE (OUTPATIENT)
Dept: NEUROSURGERY | Facility: CLINIC | Age: 41
End: 2021-04-19

## 2021-04-19 NOTE — TELEPHONE ENCOUNTER
Called to confirm patient's appt with Dr Metzger on 4/20/21  - no answer so left a message to call me back (patient's #)  I also called the patient's EC/mom, Radha Torres, and she is going to call the patient and make sure she calls me back.          josé cano CMA

## 2021-04-19 NOTE — TELEPHONE ENCOUNTER
Patient called back and left a voicemail stating she could not make the appt tomorrow & to call her back with Dr Metzger's next available appt.    I have moved her appt to his next available which is 8/3/21 @ 230 pm.  I called her back and have given her the new appt and she is fine with that - she states someone quit at work and they have no coverage.    josé

## 2021-08-02 ENCOUNTER — TELEPHONE (OUTPATIENT)
Dept: NEUROSURGERY | Facility: CLINIC | Age: 41
End: 2021-08-02

## 2021-08-02 NOTE — TELEPHONE ENCOUNTER
Called to confirm patient's appt with Dr Metzger on 8/3/2021 - no answer so left a message to call me back      josé cano CMA

## 2021-08-04 ENCOUNTER — TELEPHONE (OUTPATIENT)
Dept: NEUROSURGERY | Facility: CLINIC | Age: 41
End: 2021-08-04

## 2021-08-04 NOTE — TELEPHONE ENCOUNTER
Left a vm for patient to call back and reschedule missed appt on 8/3/21 with Dr. Metzger.    No show letter has been mailed.

## 2022-08-26 ENCOUNTER — LAB (OUTPATIENT)
Dept: LAB | Facility: HOSPITAL | Age: 42
End: 2022-08-26

## 2022-08-26 ENCOUNTER — OFFICE VISIT (OUTPATIENT)
Dept: FAMILY MEDICINE CLINIC | Facility: CLINIC | Age: 42
End: 2022-08-26

## 2022-08-26 VITALS
HEART RATE: 78 BPM | HEIGHT: 60 IN | TEMPERATURE: 98.2 F | SYSTOLIC BLOOD PRESSURE: 132 MMHG | BODY MASS INDEX: 36.91 KG/M2 | WEIGHT: 188 LBS | OXYGEN SATURATION: 100 % | DIASTOLIC BLOOD PRESSURE: 76 MMHG

## 2022-08-26 DIAGNOSIS — M54.2 CERVICAL PAIN (NECK): ICD-10-CM

## 2022-08-26 DIAGNOSIS — Z11.59 NEED FOR HEPATITIS C SCREENING TEST: ICD-10-CM

## 2022-08-26 DIAGNOSIS — M99.79 FORAMINAL STENOSIS DUE TO INTERVERTEBRAL DISC DISEASE: ICD-10-CM

## 2022-08-26 DIAGNOSIS — Z76.89 ESTABLISHING CARE WITH NEW DOCTOR, ENCOUNTER FOR: Primary | ICD-10-CM

## 2022-08-26 DIAGNOSIS — Z76.89 ESTABLISHING CARE WITH NEW DOCTOR, ENCOUNTER FOR: ICD-10-CM

## 2022-08-26 DIAGNOSIS — Z86.59 HISTORY OF DEPRESSION: ICD-10-CM

## 2022-08-26 DIAGNOSIS — M51.9 FORAMINAL STENOSIS DUE TO INTERVERTEBRAL DISC DISEASE: ICD-10-CM

## 2022-08-26 LAB
ALBUMIN SERPL-MCNC: 4.6 G/DL (ref 3.5–5.2)
ALBUMIN UR-MCNC: 4.2 MG/DL
ALBUMIN/GLOB SERPL: 1.8 G/DL
ALP SERPL-CCNC: 51 U/L (ref 39–117)
ALT SERPL W P-5'-P-CCNC: 13 U/L (ref 1–33)
AMPHET+METHAMPHET UR QL: NEGATIVE
AMPHETAMINES UR QL: NEGATIVE
ANION GAP SERPL CALCULATED.3IONS-SCNC: 10 MMOL/L (ref 5–15)
AST SERPL-CCNC: 21 U/L (ref 1–32)
BARBITURATES UR QL SCN: NEGATIVE
BASOPHILS # BLD AUTO: 0.03 10*3/MM3 (ref 0–0.2)
BASOPHILS NFR BLD AUTO: 0.4 % (ref 0–1.5)
BENZODIAZ UR QL SCN: NEGATIVE
BILIRUB SERPL-MCNC: 0.3 MG/DL (ref 0–1.2)
BUN SERPL-MCNC: 7 MG/DL (ref 6–20)
BUN/CREAT SERPL: 9.7 (ref 7–25)
BUPRENORPHINE SERPL-MCNC: NEGATIVE NG/ML
CALCIUM SPEC-SCNC: 9.2 MG/DL (ref 8.6–10.5)
CANNABINOIDS SERPL QL: POSITIVE
CHLORIDE SERPL-SCNC: 103 MMOL/L (ref 98–107)
CHOLEST SERPL-MCNC: 200 MG/DL (ref 0–200)
CO2 SERPL-SCNC: 25 MMOL/L (ref 22–29)
COCAINE UR QL: NEGATIVE
CREAT SERPL-MCNC: 0.72 MG/DL (ref 0.57–1)
DEPRECATED RDW RBC AUTO: 41.5 FL (ref 37–54)
EGFRCR SERPLBLD CKD-EPI 2021: 107.2 ML/MIN/1.73
EOSINOPHIL # BLD AUTO: 0.11 10*3/MM3 (ref 0–0.4)
EOSINOPHIL NFR BLD AUTO: 1.6 % (ref 0.3–6.2)
ERYTHROCYTE [DISTWIDTH] IN BLOOD BY AUTOMATED COUNT: 12.6 % (ref 12.3–15.4)
GLOBULIN UR ELPH-MCNC: 2.5 GM/DL
GLUCOSE SERPL-MCNC: 75 MG/DL (ref 65–99)
HBA1C MFR BLD: 5.6 % (ref 4.8–5.6)
HCT VFR BLD AUTO: 41.1 % (ref 34–46.6)
HCV AB SER DONR QL: NORMAL
HDLC SERPL-MCNC: 35 MG/DL (ref 40–60)
HGB BLD-MCNC: 13.7 G/DL (ref 12–15.9)
IMM GRANULOCYTES # BLD AUTO: 0.01 10*3/MM3 (ref 0–0.05)
IMM GRANULOCYTES NFR BLD AUTO: 0.1 % (ref 0–0.5)
IRON 24H UR-MRATE: 58 MCG/DL (ref 37–145)
IRON SATN MFR SERPL: 14 % (ref 20–50)
LDLC SERPL CALC-MCNC: 137 MG/DL (ref 0–100)
LDLC/HDLC SERPL: 3.85 {RATIO}
LYMPHOCYTES # BLD AUTO: 1.82 10*3/MM3 (ref 0.7–3.1)
LYMPHOCYTES NFR BLD AUTO: 26.9 % (ref 19.6–45.3)
MCH RBC QN AUTO: 30.1 PG (ref 26.6–33)
MCHC RBC AUTO-ENTMCNC: 33.3 G/DL (ref 31.5–35.7)
MCV RBC AUTO: 90.3 FL (ref 79–97)
METHADONE UR QL SCN: NEGATIVE
MONOCYTES # BLD AUTO: 0.42 10*3/MM3 (ref 0.1–0.9)
MONOCYTES NFR BLD AUTO: 6.2 % (ref 5–12)
NEUTROPHILS NFR BLD AUTO: 4.37 10*3/MM3 (ref 1.7–7)
NEUTROPHILS NFR BLD AUTO: 64.8 % (ref 42.7–76)
NRBC BLD AUTO-RTO: 0 /100 WBC (ref 0–0.2)
OPIATES UR QL: NEGATIVE
OXYCODONE UR QL SCN: POSITIVE
PCP UR QL SCN: NEGATIVE
PLATELET # BLD AUTO: 358 10*3/MM3 (ref 140–450)
PMV BLD AUTO: 9.9 FL (ref 6–12)
POTASSIUM SERPL-SCNC: 4 MMOL/L (ref 3.5–5.2)
PROPOXYPH UR QL: NEGATIVE
PROT SERPL-MCNC: 7.1 G/DL (ref 6–8.5)
RBC # BLD AUTO: 4.55 10*6/MM3 (ref 3.77–5.28)
SODIUM SERPL-SCNC: 138 MMOL/L (ref 136–145)
TIBC SERPL-MCNC: 402 MCG/DL (ref 298–536)
TRANSFERRIN SERPL-MCNC: 270 MG/DL (ref 200–360)
TRICYCLICS UR QL SCN: NEGATIVE
TRIGL SERPL-MCNC: 152 MG/DL (ref 0–150)
TSH SERPL DL<=0.05 MIU/L-ACNC: 1.7 UIU/ML (ref 0.27–4.2)
VLDLC SERPL-MCNC: 28 MG/DL (ref 5–40)
WBC NRBC COR # BLD: 6.76 10*3/MM3 (ref 3.4–10.8)

## 2022-08-26 PROCEDURE — 80050 GENERAL HEALTH PANEL: CPT

## 2022-08-26 PROCEDURE — 84466 ASSAY OF TRANSFERRIN: CPT | Performed by: STUDENT IN AN ORGANIZED HEALTH CARE EDUCATION/TRAINING PROGRAM

## 2022-08-26 PROCEDURE — 83540 ASSAY OF IRON: CPT | Performed by: STUDENT IN AN ORGANIZED HEALTH CARE EDUCATION/TRAINING PROGRAM

## 2022-08-26 PROCEDURE — 83036 HEMOGLOBIN GLYCOSYLATED A1C: CPT

## 2022-08-26 PROCEDURE — 82043 UR ALBUMIN QUANTITATIVE: CPT

## 2022-08-26 PROCEDURE — 99213 OFFICE O/P EST LOW 20 MIN: CPT | Performed by: STUDENT IN AN ORGANIZED HEALTH CARE EDUCATION/TRAINING PROGRAM

## 2022-08-26 PROCEDURE — 86803 HEPATITIS C AB TEST: CPT

## 2022-08-26 PROCEDURE — 80306 DRUG TEST PRSMV INSTRMNT: CPT | Performed by: STUDENT IN AN ORGANIZED HEALTH CARE EDUCATION/TRAINING PROGRAM

## 2022-08-26 PROCEDURE — 80061 LIPID PANEL: CPT | Performed by: STUDENT IN AN ORGANIZED HEALTH CARE EDUCATION/TRAINING PROGRAM

## 2022-08-26 PROCEDURE — 36415 COLL VENOUS BLD VENIPUNCTURE: CPT | Performed by: STUDENT IN AN ORGANIZED HEALTH CARE EDUCATION/TRAINING PROGRAM

## 2022-08-26 PROCEDURE — 81001 URINALYSIS AUTO W/SCOPE: CPT

## 2022-08-26 NOTE — PROGRESS NOTES
Family Medicine Residency  Marv Haywood MD    Subjective:     Gosia Brown is a 41 y.o. female who presents for ESTABLISH CARE and management of care gaps.    Cervical spine surgery- Pt will have surgery with Dr. Esparza at [facility] in [place], [place].    Pain clinic-patient continues under [facility] for pain management of gabapentin and Norco.    The following portions of the patient's history were reviewed and updated as appropriate: allergies, current medications, past family history, past medical history, past social history, past surgical history and problem list.    Past Medical Hx:  Past Medical History:   Diagnosis Date   • Anemia    • Anxiety    • Arthritis    • Back problem    • Bipolar depression (HCC)    • Depression    • Diabetes mellitus (HCC) 2019    On a strict diet so i dont become one. Sugar is staying elev   • GERD (gastroesophageal reflux disease)    • Heart problem    • Heartburn    • PTSD (post-traumatic stress disorder)        Past Surgical Hx:  Past Surgical History:   Procedure Laterality Date   • COLONOSCOPY N/A 2/8/2018    Procedure: COLONOSCOPY;  Surgeon: Geoff Frank MD;  Location: [facility] ENDOSCOPY;  Service:    • COLONOSCOPY N/A 1/28/2020    Procedure: COLONOSCOPY;  Surgeon: Geoff Frank MD;  Location: [facility] ENDOSCOPY;  Service: Gastroenterology   • ENDOSCOPY N/A 2/8/2018    Procedure: ESOPHAGOGASTRODUODENOSCOPY;  Surgeon: Geoff Frank MD;  Location: [facility] ENDOSCOPY;  Service:    • ENDOSCOPY N/A 1/28/2020    Procedure: ESOPHAGOGASTRODUODENOSCOPY;  Surgeon: Geoff Frank MD;  Location: [facility] ENDOSCOPY;  Service: Gastroenterology   • EYE SURGERY Right    • TUBAL ABDOMINAL LIGATION         Current Meds:    Current Outpatient Medications:   •  busPIRone (BUSPAR) 15 MG tablet, Take 15 mg by mouth 4 (Four) Times a Day., Disp: , Rfl:   •  gabapentin (NEURONTIN) 300 MG capsule, , Disp: , Rfl:   •  meloxicam (Mobic) 15 MG tablet, Take 1 tablet by mouth Daily.,  Disp: 30 tablet, Rfl: 0  •  methocarbamol (ROBAXIN) 500 MG tablet, Take 3 tablets by mouth 4 (Four) Times a Day., Disp: 120 tablet, Rfl: 2  •  oxyCODONE-acetaminophen (PERCOCET) 5-325 MG per tablet, Take 1 tablet by mouth., Disp: , Rfl:     Allergies:  No Known Allergies    Family Hx:  Family History   Problem Relation Age of Onset   • Arthritis Mother    • Hypertension Mother    • Ovarian cancer Mother    • Breast cancer Maternal Aunt    • Colon cancer Maternal Uncle    • Lung cancer Maternal Uncle    • Heart disease Maternal Grandfather    • Stroke Maternal Grandfather    • Cancer Maternal Grandfather    • Lung cancer Maternal Grandfather    • Lung cancer Maternal Aunt    • Cancer Maternal Grandmother    • Breast cancer Paternal Aunt         Social History:  Social History     Socioeconomic History   • Marital status: Single   Tobacco Use   • Smoking status: Never Smoker   • Smokeless tobacco: Never Used   Substance and Sexual Activity   • Alcohol use: No   • Drug use: No   • Sexual activity: Defer       Review of Systems  Review of Systems   Constitutional: Negative for activity change, appetite change, chills, diaphoresis, fatigue and fever.   HENT: Negative for congestion, dental problem, ear discharge, ear pain, hearing loss, mouth sores, nosebleeds, postnasal drip, sinus pain, sore throat, tinnitus, trouble swallowing and voice change.    Eyes: Negative for photophobia, pain, discharge and itching.   Respiratory: Negative for cough, choking, chest tightness, shortness of breath and wheezing.    Cardiovascular: Negative for chest pain, palpitations and leg swelling.   Gastrointestinal: Negative for abdominal distention, abdominal pain, blood in stool, constipation, diarrhea, nausea and vomiting.   Endocrine: Negative for cold intolerance and heat intolerance.   Genitourinary: Negative for difficulty urinating, dysuria, flank pain and hematuria.   Musculoskeletal: Positive for myalgias, neck pain and neck  "stiffness. Negative for back pain and joint swelling.   Skin: Negative for color change and rash.   Neurological: Negative for dizziness, tremors, seizures, weakness, light-headedness, numbness and headaches.   Hematological: Negative for adenopathy.   Psychiatric/Behavioral: Negative for behavioral problems, confusion, hallucinations, self-injury and sleep disturbance.        Patient at baseline mentation  Patient has some low mood in relation to cervical spine       Objective:     /76   Pulse 78   Temp 98.2 °F (36.8 °C)   Ht 152.4 cm (60\")   Wt 85.3 kg (188 lb)   SpO2 100%   BMI 36.72 kg/m²   Physical Exam  Vitals and nursing note reviewed.   Constitutional:       Appearance: Normal appearance. She is not ill-appearing or diaphoretic.   HENT:      Head: Normocephalic and atraumatic.      Right Ear: External ear normal.      Left Ear: External ear normal.      Nose: Nose normal. No rhinorrhea.      Mouth/Throat:      Mouth: Mucous membranes are moist.      Pharynx: No posterior oropharyngeal erythema.   Eyes:      General: No scleral icterus.        Right eye: No discharge.         Left eye: No discharge.      Extraocular Movements: Extraocular movements intact.   Neck:      Vascular: No carotid bruit.   Cardiovascular:      Rate and Rhythm: Normal rate and regular rhythm.      Pulses: Normal pulses.      Heart sounds: Normal heart sounds.     No gallop.   Pulmonary:      Effort: Pulmonary effort is normal.      Breath sounds: Normal breath sounds. No wheezing.   Chest:      Chest wall: No tenderness.   Abdominal:      General: Bowel sounds are normal.      Palpations: Abdomen is soft.      Tenderness: There is no rebound.   Musculoskeletal:         General: No swelling.        Arms:       Cervical back: No muscular tenderness.      Right lower leg: No edema.      Left lower leg: No edema.   Lymphadenopathy:      Cervical: No cervical adenopathy.   Skin:     General: Skin is warm and dry.      Capillary " Refill: Capillary refill takes less than 2 seconds.      Findings: No erythema or rash.   Neurological:      General: No focal deficit present.      Mental Status: She is alert and oriented to person, place, and time.      Motor: No weakness.   Psychiatric:         Mood and Affect: Mood normal.         Behavior: Behavior normal.         Thought Content: Thought content normal.         Judgment: Judgment normal.      Comments: Patient reports at baseline mentation  Patient has some low mood in relation to cervical spine symptoms  Patient previously on antidepressant would like referral onwards for psychiatry input          Assessment   Gosia Brown is a 41 y.o. female who presents for establish care with new physician and closure of care gaps.    Controlled substances Yogi:  Yogi [phone]  Patient has gabapentin and oxycodone refilled by [facility].  Patient educated in relation to that these medications would not be refilled at this clinic.  Patient agreement understood.  UDS ordered    Establish care:  Basic lab test ordered    Cervical spine stenosis:  Patient under Dr. Esparza at [facility]  Planned cervical spine intervention in September 2022    History of depression:  Gene[employer]ight testing ordered  Patient referred onto psychiatry telephone services at [place]  Patient fully educated in agreement relation to this plan of care    The patient was educated on the risks, benefits and alternatives to therapy. The patient was in agreement with therapy and plan of care.    Plan     Next visit discuss lab results  Next visit discussed the patient cervical spine surgery  Next visit discussed with patient if had referral from psychiatric [place] initiated  Next visit close care gaps  Next visit check patients medication regime.  Next visit check patients compliance to medication regime.    Diagnoses and all orders for this visit:    1. Establishing care with new doctor, encounter for (Primary)  -     GHAZALA christianson  AUTO Differential; Future  -     Comprehensive Metabolic Panel  -     Hemoglobin A1c; Future  -     Lipid Panel  -     Urine Drug Screen - Urine, Clean Catch  -     MicroAlbumin, Urine, Random - Urine, Clean Catch; Future  -     Urinalysis With Culture If Indicated -; Future  -     TSH  -     Iron Profile    2. Foraminal stenosis due to intervertebral disc disease    3. Cervical pain (neck)    4. History of depression  -     Ambulatory Ref[facility] Psychiatry  -     [facility]    5. Need for hepatitis C screening test  -     Hepatitis C Antibody; Future      · Rx changes: None at this point in time  · Patient Education: Advised education to best management of chronic pain  · Compliance at present is estimated to be fair.   · Efforts to improve compliance (if necessary) will be directed at increased exercise.    Depression screening: Patient screened positive for depression based on a PHQ-9 score of 24 on 8/26/2022. Follow-up recommendations include: Elevated PHQ score reflective of acute illness, not depression and Referral to Mental Health specialist.       Follow-up:     Return in about 1 week (around 9/2/2022) for Recheck.    Preventative:  Health Maintenance   Topic Date Due   • ANNUAL PHYSICAL  Never done   • HEPATITIS C SCREENING  Never done   • COVID-19 Vaccine (3 - Booster for Pfizer series) 06/28/2022   • INFLUENZA VACCINE  10/01/2022   • PAP SMEAR  09/04/2023   • COLORECTAL CANCER SCREENING  01/28/2025   • TDAP/TD VACCINES (2 - Td or Tdap) 10/14/2029   • Pneumococcal Vaccine 0-64  Aged Out     Female Preventative: Does monthly breast self examination  Recommended: none  Vaccine Counseling: N/A    Weight  -Class: Obese Class II: 35-39.9kg/m2  -Class 2 Severe Obesity (BMI >=35 and <=39.9). Obesity-related health conditions include the following: dyslipidemias. Obesity is newly identified. BMI is is above average; BMI management plan is completed. We discussed portion control and increasing  exercise.   eat more fruits and vegetables, decrease soda or juice intake, increase water intake, increase physical activity, reduce screen time, reduce portion size, cut out extra servings and reduce fast food intake    Alcohol use:  reports no history of alcohol use.  Nicotine status  reports that she has never smoked. She has never used smokeless tobacco.     Goals    None         RISK SCORE: 3    Yogi request #[id number]. Reviewed and appropriate.     Signature  [place] [address]  Office: [phone]      This document has been electronically signed by Marv Haywood MD on August 26, 2022 09:32 CDT      Part of this note may be an electronic transcription/translation of spoken language to printed text using the Dragon Dictation System.   Answers for HPI/ROS submitted by the patient on 8/25/2022  Please describe your symptoms.: I need a family dredgarnt had one in a few years. Need blood work done, having trouble sleeping and need to talk to him about a few other things  Have you had these symptoms before?: Yes  How long have you been having these symptoms?: Greater than 2 weeks  Please describe any probable cause for these symptoms. : Always had trouble sleeping, and  What is the primary reason for your visit?: Other

## 2022-08-27 LAB
BACTERIA UR QL AUTO: ABNORMAL /HPF
BILIRUB UR QL STRIP: NEGATIVE
CLARITY UR: CLEAR
COLOR UR: YELLOW
GLUCOSE UR STRIP-MCNC: NEGATIVE MG/DL
HGB UR QL STRIP.AUTO: ABNORMAL
HYALINE CASTS UR QL AUTO: ABNORMAL /LPF
KETONES UR QL STRIP: NEGATIVE
LEUKOCYTE ESTERASE UR QL STRIP.AUTO: NEGATIVE
NITRITE UR QL STRIP: NEGATIVE
PH UR STRIP.AUTO: 7 [PH] (ref 5–8)
PROT UR QL STRIP: ABNORMAL
RBC # UR STRIP: ABNORMAL /HPF
REF LAB TEST METHOD: ABNORMAL
SP GR UR STRIP: 1.01 (ref 1–1.03)
SQUAMOUS #/AREA URNS HPF: ABNORMAL /HPF
UROBILINOGEN UR QL STRIP: ABNORMAL
WBC # UR STRIP: ABNORMAL /HPF

## 2022-08-29 ENCOUNTER — DOCUMENTATION (OUTPATIENT)
Dept: FAMILY MEDICINE CLINIC | Facility: CLINIC | Age: 42
End: 2022-08-29

## 2022-08-29 NOTE — PROGRESS NOTES
Time of phone conversation: 07:59 CDT 8/29/2022       Gosia Brown is a 41 y.o. y.o. female  who I contacted in relation to their recent Blood  results.     The patient was consulted appropriately. The patient had full understanding and awareness in relation to their results.     Gosia Brown is happy to return in 1 week to recheck their results and any further consultation deemed necessary.      At the end of our phone conversation Gosia Brown was happy, content and will continue with their healthy eating and exercise plan. The patient will contact me should they have any queries or concerns.     Signature  Marv Haywood MD  [place] [id number]  Office: [phone]

## 2022-09-12 ENCOUNTER — OFFICE VISIT (OUTPATIENT)
Dept: FAMILY MEDICINE CLINIC | Facility: CLINIC | Age: 42
End: 2022-09-12

## 2022-09-12 VITALS
DIASTOLIC BLOOD PRESSURE: 76 MMHG | TEMPERATURE: 97.5 F | WEIGHT: 181.6 LBS | OXYGEN SATURATION: 98 % | HEART RATE: 88 BPM | SYSTOLIC BLOOD PRESSURE: 132 MMHG | HEIGHT: 60 IN | BODY MASS INDEX: 35.65 KG/M2

## 2022-09-12 DIAGNOSIS — Z71.2 ENCOUNTER TO DISCUSS TEST RESULTS: Primary | ICD-10-CM

## 2022-09-12 PROCEDURE — 99213 OFFICE O/P EST LOW 20 MIN: CPT | Performed by: STUDENT IN AN ORGANIZED HEALTH CARE EDUCATION/TRAINING PROGRAM

## 2022-09-12 NOTE — PROGRESS NOTES
I have seen the patient.  I have reviewed the notes, assessments, and/or procedures performed by Marv Haywood MD  during office visit I concur with her/his documentation and assessment and plan for Gosia Brown.  Patient requested pre-op EKG and CXR for upcoming cervical spine surgery.  Res clinic has not received documentations corroborating this and should follow-up with surgeon for any pre-op recommendations.          This document has been electronically signed by Lisa Glover MD on September 12, 2022 14:18 CDT

## 2022-09-12 NOTE — PROGRESS NOTES
Family Medicine Residency  Marv Haywood MD    Subjective:     Gosia Brown is a 41 y.o. female who presents for discussion of lab results.  Patient continues under surgeon in [place] for cervical spine surgery, upcoming in September 2022.  Patient has spoken with shanel Kim in relation to booking an appointment.  Patient still awaits GeneSight testing results which will help shanel Kim with plan of care.  Patient today request EKG and chest x-ray presurgery.  This office has not received any request from the patient's surgeon in relation to the above.  Patient will have this testing completed at [place] as will be best practice and care for the patient.    The following portions of the patient's history were reviewed and updated as appropriate: allergies, current medications, past family history, past medical history, past social history, past surgical history and problem list.    Past Medical Hx:  Past Medical History:   Diagnosis Date   • Anemia    • Anxiety    • Arthritis    • Back problem    • Bipolar depression (HCC)    • Depression    • Diabetes mellitus (HCC) 2019    On a strict diet so i dont become one. Sugar is staying elev   • GERD (gastroesophageal reflux disease)    • Heart problem    • Heartburn    • PTSD (post-traumatic stress disorder)        Past Surgical Hx:  Past Surgical History:   Procedure Laterality Date   • COLONOSCOPY N/A 2/8/2018    Procedure: COLONOSCOPY;  Surgeon: Geoff Frank MD;  Location: [facility] ENDOSCOPY;  Service:    • COLONOSCOPY N/A 1/28/2020    Procedure: COLONOSCOPY;  Surgeon: Geoff Frank MD;  Location: [facility] ENDOSCOPY;  Service: Gastroenterology   • ENDOSCOPY N/A 2/8/2018    Procedure: ESOPHAGOGASTRODUODENOSCOPY;  Surgeon: Geoff Frank MD;  Location: [facility] ENDOSCOPY;  Service:    • ENDOSCOPY N/A 1/28/2020    Procedure: ESOPHAGOGASTRODUODENOSCOPY;  Surgeon: Geoff Frank MD;  Location: [facility] ENDOSCOPY;  Service: Gastroenterology    • EYE SURGERY Right    • TUBAL ABDOMINAL LIGATION         Current Meds:    Current Outpatient Medications:   •  gabapentin (NEURONTIN) 300 MG capsule, , Disp: , Rfl:   •  meloxicam (Mobic) 15 MG tablet, Take 1 tablet by mouth Daily., Disp: 30 tablet, Rfl: 0  •  methocarbamol (ROBAXIN) 500 MG tablet, Take 3 tablets by mouth 4 (Four) Times a Day., Disp: 120 tablet, Rfl: 2  •  oxyCODONE-acetaminophen (PERCOCET) 5-325 MG per tablet, Take 1 tablet by mouth., Disp: , Rfl:     Allergies:  No Known Allergies    Family Hx:  Family History   Problem Relation Age of Onset   • Arthritis Mother    • Hypertension Mother    • Ovarian cancer Mother    • Breast cancer Maternal Aunt    • Colon cancer Maternal Uncle    • Lung cancer Maternal Uncle    • Heart disease Maternal Grandfather    • Stroke Maternal Grandfather    • Cancer Maternal Grandfather    • Lung cancer Maternal Grandfather    • Lung cancer Maternal Aunt    • Cancer Maternal Grandmother    • Breast cancer Paternal Aunt         Social History:  Social History     Socioeconomic History   • Marital status: Single   Tobacco Use   • Smoking status: Never Smoker   • Smokeless tobacco: Never Used   Substance and Sexual Activity   • Alcohol use: No   • Drug use: No   • Sexual activity: Defer       Review of Systems  Review of Systems   Constitutional: Negative for activity change, appetite change, chills, diaphoresis, fatigue and fever.   HENT: Negative for congestion, dental problem, ear discharge, ear pain, hearing loss, mouth sores, nosebleeds, postnasal drip, sinus pain, sore throat, tinnitus, trouble swallowing and voice change.    Eyes: Negative for photophobia, pain, discharge and itching.   Respiratory: Negative for cough, choking, chest tightness, shortness of breath and wheezing.    Cardiovascular: Negative for chest pain, palpitations and leg swelling.   Gastrointestinal: Negative for abdominal distention, abdominal pain, blood in stool, constipation, diarrhea,  "nausea and vomiting.   Endocrine: Negative for cold intolerance and heat intolerance.   Genitourinary: Negative for difficulty urinating, dysuria, flank pain and hematuria.   Musculoskeletal: Positive for arthralgias, myalgias, neck pain and neck stiffness. Negative for back pain and joint swelling.   Skin: Negative for color change and rash.   Neurological: Negative for dizziness, tremors, seizures, weakness, light-headedness, numbness and headaches.   Hematological: Negative for adenopathy.   Psychiatric/Behavioral: Negative for behavioral problems, confusion, hallucinations, self-injury and sleep disturbance.       Objective:     /76   Pulse 88   Temp 97.5 °F (36.4 °C)   Ht 152.4 cm (60\")   Wt 82.4 kg (181 lb 9.6 oz)   SpO2 98%   BMI 35.47 kg/m²   Physical Exam  Vitals and nursing note reviewed.   Constitutional:       Appearance: She is obese. She is not ill-appearing or diaphoretic.   HENT:      Head: Normocephalic and atraumatic.      Right Ear: External ear normal.      Left Ear: External ear normal.      Nose: Nose normal. No rhinorrhea.      Mouth/Throat:      Mouth: Mucous membranes are moist.      Pharynx: No posterior oropharyngeal erythema.   Eyes:      General: No scleral icterus.        Right eye: No discharge.         Left eye: No discharge.      Extraocular Movements: Extraocular movements intact.   Neck:      Vascular: No carotid bruit.   Cardiovascular:      Rate and Rhythm: Normal rate and regular rhythm.      Pulses: Normal pulses.      Heart sounds: Normal heart sounds.     No gallop.   Pulmonary:      Effort: Pulmonary effort is normal.      Breath sounds: Normal breath sounds. No wheezing.   Chest:      Chest wall: No tenderness.   Abdominal:      General: Bowel sounds are normal.      Palpations: Abdomen is soft.      Tenderness: There is no rebound.   Musculoskeletal:         General: No swelling.        Arms:       Cervical back: No muscular tenderness.      Right lower leg: No " edema.      Left lower leg: No edema.   Lymphadenopathy:      Cervical: No cervical adenopathy.   Skin:     General: Skin is warm and dry.      Capillary Refill: Capillary refill takes less than 2 seconds.      Findings: No erythema or rash.   Neurological:      General: No focal deficit present.      Mental Status: She is alert and oriented to person, place, and time.      Motor: No weakness.   Psychiatric:         Mood and Affect: Mood normal.         Behavior: Behavior normal.         Thought Content: Thought content normal.         Judgment: Judgment normal.          Assessment   Gosia Brown is a 41 y.o. female who presents for:    Results discussion:  Discussed lab results with patient    Controlled substances Yogi:  Yogi: PDMP  Patient has gabapentin and oxycodone refilled by Fabienne Oneill.  As per last visit patient educated in relation to that these medications would not be refilled at this clinic.  Patient agreement and continues to understand.     Cervical spine stenosis:  Patient under Dr. Esparza at [facility]  Planned cervical spine intervention in September 2022  Discussed patient's plan for presurgical clearance, patient will have this done at surgery team in [place].  Patient was under the impression that a request was sent to this clinic in relation to pretesting orders.  No orders were received to this clinic.  Patient will have presurgical testing in [place].  The patient was upset that the presurgical testing could not be completed today at this clinic.  I offered my apologies to the patient at this point in time and my attending was made aware.     History of depression:  GeneSSoluto testing ordered//still awaiting GeneSSoluto  Patient referred onto psychiatry telephone services at [place]//patient currently has an appointment at end of September/ early October 2022  The patient was disappointed that the GeneSight testing had not arrived at this clinic at this point in time.  I  reassured the patient that once we receive the GeneSight testing that I would contact her to best help her in her care.  Patient fully educated in agreement relation to this plan of care    The patient was educated on the risks, benefits and alternatives to therapy. The patient was in agreement with therapy and plan of care.    Plan     Next visit annual physical  Next visit review postsurgical intervention  Next visit continue to make sure patient has pain management by management clinic or surgical team  Next visit check patients medication regime.  Next visit check patients compliance to medication regime.    Diagnoses and all orders for this visit:    1. Encounter to discuss test results (Primary)      · Rx changes: As above  · Patient Education: Education relation to managing pain may require intervention from physical therapy post surgery  · Compliance at present is estimated to be fair.   · Efforts to improve compliance (if necessary) will be directed at increased exercise.    Depression screening: Up to date; last screen 8/26/2022       Follow-up:     Return in about 3 months (around 12/12/2022), or Annual physical, for Annual.    Preventative:  Health Maintenance   Topic Date Due   • ANNUAL PHYSICAL  Never done   • COVID-19 Vaccine (3 - Booster for Pfizer series) 06/28/2022   • INFLUENZA VACCINE  10/01/2022   • PAP SMEAR  09/04/2023   • COLORECTAL CANCER SCREENING  01/28/2025   • TDAP/TD VACCINES (2 - Td or Tdap) 10/14/2029   • HEPATITIS C SCREENING  Completed   • Pneumococcal Vaccine 0-64  Aged Out     Female Preventative: Does monthly breast self examination  Recommended: none  Vaccine Counseling: N/A    Weight  -Class: Obese Class II: 35-39.9kg/m2  -Class 2 Severe Obesity (BMI >=35 and <=39.9). Obesity-related health conditions include the following: none. Obesity is unchanged. BMI is is above average; BMI management plan is completed. We discussed portion control and increasing exercise.   eat more  fruits and vegetables, decrease soda or juice intake, increase water intake, increase physical activity, reduce screen time, reduce portion size, cut out extra servings and reduce fast food intake    Alcohol use:  reports no history of alcohol use.  Nicotine status  reports that she has never smoked. She has never used smokeless tobacco.     Goals    None         RISK SCORE: 3    Yogi request #PDMP. Reviewed and appropriate.     Signature  [place] [address]  Office: [phone]      This document has been electronically signed by Marv Haywood MD on September 12, 2022 11:00 CDT      Part of this note may be an electronic transcription/translation of spoken language to printed text using the Dragon Dictation System.

## 2022-09-13 ENCOUNTER — DOCUMENTATION (OUTPATIENT)
Dept: FAMILY MEDICINE CLINIC | Facility: CLINIC | Age: 42
End: 2022-09-13

## 2022-09-13 DIAGNOSIS — Z86.59 HISTORY OF DEPRESSION: ICD-10-CM

## 2022-09-13 DIAGNOSIS — F33.41 RECURRENT MAJOR DEPRESSIVE DISORDER, IN PARTIAL REMISSION: Primary | ICD-10-CM

## 2022-09-13 RX ORDER — LEVOMEFOLATE CALCIUM 7.5 MG TABLET
7.5 TABLET DAILY
Qty: 30 TABLET | Refills: 2 | Status: SHIPPED | OUTPATIENT
Start: 2022-09-13 | End: 2022-09-30 | Stop reason: SDUPTHER

## 2022-09-13 RX ORDER — BUPROPION HYDROCHLORIDE 150 MG/1
150 TABLET ORAL DAILY
Qty: 30 TABLET | Refills: 1 | Status: SHIPPED | OUTPATIENT
Start: 2022-09-13 | End: 2022-10-31 | Stop reason: SDUPTHER

## 2022-09-13 NOTE — PROGRESS NOTES
Time of phone conversation: 08:17 CDT 9/13/2022       Gosia Brown is a 41 y.o. y.o. female  who I contacted in relation to their recent GeneSight testing results.      The patient was started on the following:  Wellbutrin 150 mg XL 60 days  L-methyl folate 7.5 mg    The patient was consulted appropriately. The patient had full understanding and awareness in relation to their medications and side effects.     Gosia Brown is happy to return in 4 week to recheck their medications and any further consultation deemed necessary.      At the end of our phone conversation Gosia Brown was happy, content and will continue with their healthy eating and exercise plan.    The patient will contact me should they have any queries or concerns.     Signature  Marv Haywood MD  [place] [id number]  Office: [phone]

## 2022-09-15 ENCOUNTER — TELEPHONE (OUTPATIENT)
Dept: FAMILY MEDICINE CLINIC | Facility: CLINIC | Age: 42
End: 2022-09-15

## 2022-09-15 NOTE — TELEPHONE ENCOUNTER
PATIENT CALLED STATING DR STEVEN CALLED HER IN A NEW MED THAT IS NOT COVERED BY INSURANCE. CAN HE CALL SOMETHING ELSE IN TO [facility]? (LMETHYLENE) HER CALL BACK NUMBER [phone]

## 2022-09-30 RX ORDER — LEVOMEFOLATE CALCIUM 7.5 MG TABLET
7.5 TABLET DAILY
Qty: 30 TABLET | Refills: 2 | Status: SHIPPED | OUTPATIENT
Start: 2022-09-30 | End: 2022-12-29

## 2022-10-07 ENCOUNTER — DOCUMENTATION (OUTPATIENT)
Dept: FAMILY MEDICINE CLINIC | Facility: CLINIC | Age: 42
End: 2022-10-07

## 2022-10-07 DIAGNOSIS — I48.91 ATRIAL FIBRILLATION, UNSPECIFIED TYPE: Primary | ICD-10-CM

## 2022-10-07 NOTE — PROGRESS NOTES
Time of phone conversation: 15:16 CDT 10/7/2022       Gosia Brown is a 41 y.o. y.o. female  who I contacted in relation to their recent Telephone call.    Patient has possible A. fib, patient will bring documentation from [facility].  Patient was diagnosed at [facility] in [place], [place] during a procedure.    The patient will contact me should they have any queries or concerns.     Signature  Marv Haywood MD  [facility]  [address] [place] [address]  Office: [phone]

## 2022-10-10 ENCOUNTER — DOCUMENTATION (OUTPATIENT)
Dept: FAMILY MEDICINE CLINIC | Facility: CLINIC | Age: 42
End: 2022-10-10

## 2022-10-10 NOTE — PROGRESS NOTES
Time of phone call: 10:32 CDT 10/10/2022       Gosia Brown is a 41 y.o. y.o. female  who I contacted in relation to their recent phone call. The patient was unavailable by phone and was unable to reach by voicemail.       Signature  [place] [address]  Office: [phone]

## 2022-10-31 ENCOUNTER — OFFICE VISIT (OUTPATIENT)
Dept: FAMILY MEDICINE CLINIC | Facility: CLINIC | Age: 42
End: 2022-10-31

## 2022-10-31 VITALS
TEMPERATURE: 97.5 F | BODY MASS INDEX: 35.18 KG/M2 | DIASTOLIC BLOOD PRESSURE: 70 MMHG | WEIGHT: 179.2 LBS | SYSTOLIC BLOOD PRESSURE: 110 MMHG | OXYGEN SATURATION: 98 % | HEART RATE: 61 BPM | HEIGHT: 60 IN

## 2022-10-31 DIAGNOSIS — Z86.59 HISTORY OF DEPRESSION: ICD-10-CM

## 2022-10-31 DIAGNOSIS — M54.2 CERVICAL PAIN (NECK): Primary | ICD-10-CM

## 2022-10-31 DIAGNOSIS — M51.9 FORAMINAL STENOSIS DUE TO INTERVERTEBRAL DISC DISEASE: ICD-10-CM

## 2022-10-31 DIAGNOSIS — M99.79 FORAMINAL STENOSIS DUE TO INTERVERTEBRAL DISC DISEASE: ICD-10-CM

## 2022-10-31 DIAGNOSIS — I48.91 ATRIAL FIBRILLATION, UNSPECIFIED TYPE: ICD-10-CM

## 2022-10-31 PROCEDURE — 99213 OFFICE O/P EST LOW 20 MIN: CPT | Performed by: STUDENT IN AN ORGANIZED HEALTH CARE EDUCATION/TRAINING PROGRAM

## 2022-10-31 RX ORDER — APIXABAN 5 MG/1
TABLET, FILM COATED ORAL
COMMUNITY
Start: 2022-09-30

## 2022-10-31 RX ORDER — METOPROLOL SUCCINATE 50 MG/1
50 TABLET, EXTENDED RELEASE ORAL 2 TIMES DAILY
COMMUNITY
Start: 2022-10-01 | End: 2023-04-17

## 2022-10-31 RX ORDER — LOSARTAN POTASSIUM 50 MG/1
1 TABLET ORAL DAILY
COMMUNITY
Start: 2022-04-14 | End: 2023-01-30

## 2022-10-31 RX ORDER — BUPROPION HYDROCHLORIDE 150 MG/1
150 TABLET ORAL DAILY
Qty: 30 TABLET | Refills: 2 | Status: SHIPPED | OUTPATIENT
Start: 2022-10-31 | End: 2022-11-17

## 2022-10-31 NOTE — PROGRESS NOTES
Family Medicine Residency  Marv Haywood MD    Subjective:     Gosia Brown is a 41 y.o. female who presents for history of depression, C4-7 spinal fusion on 27th September 2022, Atrial fibrillation and management of medications. Patient continues under surgeon in [place] for cervical spine surgery, pt reports doing well and happy with plan of care. Pt doing well on medication for depression. Pt would like referral onto Dr. Velásquez for new atrial fibrillation, possible family history.     The following portions of the patient's history were reviewed and updated as appropriate: allergies, current medications, past family history, past medical history, past social history, past surgical history and problem list.    Past Medical Hx:  Past Medical History:   Diagnosis Date   • Anemia    • Anxiety    • Arthritis    • Back problem    • Bipolar depression (HCC)    • Depression    • Diabetes mellitus (HCC) 2019    On a strict diet so i dont become one. Sugar is staying elev   • GERD (gastroesophageal reflux disease)    • Heart problem    • Heartburn    • PTSD (post-traumatic stress disorder)        Past Surgical Hx:  Past Surgical History:   Procedure Laterality Date   • COLONOSCOPY N/A 02/08/2018    Procedure: COLONOSCOPY;  Surgeon: Geoff Frank MD;  Location: [facility] ENDOSCOPY;  Service:    • COLONOSCOPY N/A 01/28/2020    Procedure: COLONOSCOPY;  Surgeon: Geoff Frank MD;  Location: [facility] ENDOSCOPY;  Service: Gastroenterology   • ENDOSCOPY N/A 02/08/2018    Procedure: ESOPHAGOGASTRODUODENOSCOPY;  Surgeon: Geoff Frank MD;  Location: [facility] ENDOSCOPY;  Service:    • ENDOSCOPY N/A 01/28/2020    Procedure: ESOPHAGOGASTRODUODENOSCOPY;  Surgeon: Geoff Frnak MD;  Location: [facility] ENDOSCOPY;  Service: Gastroenterology   • EYE SURGERY Right    • SPINAL FUSION  09/27/2022   • TUBAL ABDOMINAL LIGATION         Current Meds:    Current Outpatient Medications:   •  buPROPion XL (Wellbutrin XL) 150 MG 24  hr tablet, Take 1 tablet by mouth Daily for 90 days., Disp: 30 tablet, Rfl: 2  •  gabapentin (NEURONTIN) 300 MG capsule, , Disp: , Rfl:   •  losartan (COZAAR) 50 MG tablet, Take 1 tablet by mouth Daily., Disp: , Rfl:   •  methocarbamol (ROBAXIN) 500 MG tablet, Take 3 tablets by mouth 4 (Four) Times a Day., Disp: 120 tablet, Rfl: 2  •  metoprolol succinate XL (TOPROL-XL) 50 MG 24 hr tablet, Take 1 tablet by mouth., Disp: , Rfl:   •  oxyCODONE-acetaminophen (PERCOCET) 5-325 MG per tablet, Take 1 tablet by mouth., Disp: , Rfl:   •  Eliquis 5 MG tablet tablet, , Disp: , Rfl:   •  l-methylfolate 7.5 MG tablet tablet, Take 1 tablet by mouth Daily for 90 days., Disp: 30 tablet, Rfl: 2  •  meloxicam (Mobic) 15 MG tablet, Take 1 tablet by mouth Daily., Disp: 30 tablet, Rfl: 0    Allergies:  Allergies   Allergen Reactions   • Hydrocodone-Acetaminophen Itching       Family Hx:  Family History   Problem Relation Age of Onset   • Arthritis Mother    • Hypertension Mother    • Ovarian cancer Mother    • Breast cancer Maternal Aunt    • Colon cancer Maternal Uncle    • Lung cancer Maternal Uncle    • Heart disease Maternal Grandfather    • Stroke Maternal Grandfather    • Cancer Maternal Grandfather    • Lung cancer Maternal Grandfather    • Lung cancer Maternal Aunt    • Cancer Maternal Grandmother    • Breast cancer Paternal Aunt         Social History:  Social History     Socioeconomic History   • Marital status: Single   Tobacco Use   • Smoking status: Never   • Smokeless tobacco: Never   Substance and Sexual Activity   • Alcohol use: No   • Drug use: No   • Sexual activity: Defer       Review of Systems  Review of Systems   Constitutional: Negative for activity change, appetite change, chills, diaphoresis, fatigue and fever.   HENT: Negative for congestion, dental problem, ear discharge, ear pain, hearing loss, mouth sores, nosebleeds, postnasal drip, sinus pain, sore throat, tinnitus, trouble swallowing and voice change.   "  Eyes: Negative for photophobia, pain, discharge and itching.   Respiratory: Negative for cough, choking, chest tightness, shortness of breath and wheezing.    Cardiovascular: Negative for chest pain, palpitations and leg swelling.   Gastrointestinal: Negative for abdominal distention, abdominal pain, blood in stool, constipation, diarrhea, nausea and vomiting.   Endocrine: Negative for cold intolerance and heat intolerance.   Genitourinary: Negative for difficulty urinating, dysuria, flank pain and hematuria.   Musculoskeletal: Positive for arthralgias and neck pain. Negative for back pain and joint swelling.   Skin: Negative for color change and rash.   Neurological: Negative for dizziness, tremors, seizures, weakness, light-headedness, numbness and headaches.   Hematological: Negative for adenopathy.   Psychiatric/Behavioral: Negative for behavioral problems, confusion, hallucinations, self-injury and sleep disturbance.       Objective:     /70   Pulse 61   Temp 97.5 °F (36.4 °C)   Ht 152.4 cm (60\")   Wt 81.3 kg (179 lb 3.2 oz)   SpO2 98%   BMI 35.00 kg/m²   Physical Exam  Vitals and nursing note reviewed.   Constitutional:       Appearance: Normal appearance. She is not ill-appearing or diaphoretic.   HENT:      Head: Normocephalic and atraumatic.      Right Ear: External ear normal.      Left Ear: External ear normal.      Nose: Nose normal. No rhinorrhea.      Mouth/Throat:      Mouth: Mucous membranes are moist.      Pharynx: No posterior oropharyngeal erythema.   Eyes:      General: No scleral icterus.        Right eye: No discharge.         Left eye: No discharge.      Extraocular Movements: Extraocular movements intact.   Neck:      Vascular: No carotid bruit.   Cardiovascular:      Rate and Rhythm: Normal rate. Rhythm irregular.      Pulses: Normal pulses.      Heart sounds: Normal heart sounds.     No gallop.      Comments: Atrial fibrillation  Pulmonary:      Effort: Pulmonary effort is " normal.      Breath sounds: Normal breath sounds. No wheezing.   Chest:      Chest wall: No tenderness.   Abdominal:      General: Bowel sounds are normal.      Palpations: Abdomen is soft.      Tenderness: There is no rebound.   Musculoskeletal:         General: No swelling.      Cervical back: No muscular tenderness.      Right lower leg: No edema.      Left lower leg: No edema.   Lymphadenopathy:      Cervical: No cervical adenopathy.   Skin:     General: Skin is warm and dry.      Capillary Refill: Capillary refill takes less than 2 seconds.      Findings: No erythema or rash.   Neurological:      General: No focal deficit present.      Mental Status: She is alert and oriented to person, place, and time.      Motor: No weakness.   Psychiatric:         Mood and Affect: Mood normal.         Behavior: Behavior normal.         Thought Content: Thought content normal.         Judgment: Judgment normal.          Assessment   Gosia Brown is a 41 y.o. female who presents for:    History of depression:  Treatment initiated based off GeneSight testing results  The patient was started on the followin2022  Wellbutrin 150 mg XL 60 days  L-methyl folate 7.5 mg  Patient has been reviewed by psychiatry HARIS Kim  Patient referred onto Dr. Servin psychoogy    Cervical spine stenosis:  Patient under Dr. Esparza at [facility]  Cervical spine intervention in 2022  Patient to follow-up with surgeon over next 2 weeks    Atrial fibrillation:  Referred onto cardiology for intervention, Dr. Velásquez    Controlled substances Yogi:  Yogi: PDMP  Patient has gabapentin and oxycodone refilled by Fabienne Oneill.  As per previous visit patient educated in relation to that these medications would not be refilled at this clinic. Patient agreement and continues to understand.     The patient was educated on the risks, benefits and alternatives to therapy. The patient was in agreement with therapy and plan of  care.    Plan     Next visit review patient's depression  Next visit review patient's cervical spine stenosis post surgery consult  Next visit see if patient has attended cardiology Dr. Velásquez  Next visit check patients medication regime.  Next visit check patients compliance to medication regime.    Diagnoses and all orders for this visit:    1. Cervical pain (neck) (Primary)    2. Foraminal stenosis due to intervertebral disc disease    3. History of depression  -     Ambulatory Referral to Psychiatry  -     Ambulatory Referral to Psychology  -     buPROPion XL (Wellbutrin XL) 150 MG 24 hr tablet; Take 1 tablet by mouth Daily for 90 days.  Dispense: 30 tablet; Refill: 2    4. Atrial fibrillation, unspecified type (HCC)  -     Ambulatory Referral to Cardiology    · Rx changes: As above  · Patient Education: Advice education diet lifestyle exercise range movement exercises and pain management  · Compliance at present is estimated to be fair.   · Efforts to improve compliance (if necessary) will be directed at increased exercise.    Depression screening: Up to date; last screen 8/26/2022       Follow-up:     Return in about 11 weeks (around 1/16/2023).    Preventative:  Health Maintenance   Topic Date Due   • ANNUAL PHYSICAL  Never done   • COVID-19 Vaccine (3 - Booster for Pfizer series) 03/25/2022   • PAP SMEAR  09/04/2023   • COLORECTAL CANCER SCREENING  01/28/2025   • TDAP/TD VACCINES (2 - Td or Tdap) 10/14/2029   • HEPATITIS C SCREENING  Completed   • INFLUENZA VACCINE  Completed   • Pneumococcal Vaccine 0-64  Aged Out     Female Preventative: Does monthly breast self examination  Recommended: none  Vaccine Counseling: N/A    Weight  -Class: Normal: 18.5-24.9kg/m2  -Class 2 Severe Obesity (BMI >=35 and <=39.9). Obesity-related health conditions include the following: none. Obesity is unchanged. BMI is is above average; BMI management plan is completed. We discussed portion control and increasing exercise.   eat  more fruits and vegetables, decrease soda or juice intake, increase water intake, increase physical activity, reduce screen time, reduce portion size, cut out extra servings and reduce fast food intake    Alcohol use:  reports no history of alcohol use.  Nicotine status  reports that she has never smoked. She has never used smokeless tobacco.     Goals    None         RISK SCORE: 3    Yogi request #PDMP. Reviewed and appropriate.     Signature  [place] [address]  Office: [phone]      This document has been electronically signed by Marv Haywood MD on October 31, 2022 16:32 CDT      Portions of this note were copied from progress note written by Dr. Haywood; note reviewed and updated as appropriate.    Part of this note may be an electronic transcription/translation of spoken language to printed text using the Dragon Dictation System.

## 2022-11-17 ENCOUNTER — TELEMEDICINE (OUTPATIENT)
Dept: PSYCHIATRY | Facility: CLINIC | Age: 42
End: 2022-11-17

## 2022-11-17 DIAGNOSIS — F31.30 BIPOLAR I DISORDER, MOST RECENT EPISODE DEPRESSED: Primary | ICD-10-CM

## 2022-11-17 PROCEDURE — 90792 PSYCH DIAG EVAL W/MED SRVCS: CPT | Performed by: NURSE PRACTITIONER

## 2022-11-17 RX ORDER — LAMOTRIGINE 25 MG/1
TABLET ORAL
Qty: 90 TABLET | Refills: 0 | Status: SHIPPED | OUTPATIENT
Start: 2022-11-17 | End: 2022-12-14 | Stop reason: SINTOL

## 2022-11-17 RX ORDER — AMOXICILLIN 500 MG/1
TABLET, FILM COATED ORAL
COMMUNITY
Start: 2022-11-16 | End: 2023-01-10

## 2022-11-17 RX ORDER — BUPROPION HYDROCHLORIDE 300 MG/1
300 TABLET ORAL EVERY MORNING
Qty: 30 TABLET | Refills: 1 | Status: SHIPPED | OUTPATIENT
Start: 2022-11-17 | End: 2023-02-01 | Stop reason: SDUPTHER

## 2022-11-17 NOTE — PATIENT INSTRUCTIONS
Start lamotrigine titration to help with mood stabilization, irritability, treatment resistant depressive symptoms.  For 2 weeks take 1 tablet (25 mg) daily.  Beginning with week 3 for 2 weeks to take 50 mg daily.  On week 5 can increase to 75 mg daily.  Increase Wellbutrin to 300 mg extended release tablet every morning.  Monitor for mood changes, side effects such as tremors, dizziness, rash.  For concerns or needing assistance call the [facility] at [phone]
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
## 2022-12-08 ENCOUNTER — OFFICE VISIT (OUTPATIENT)
Dept: FAMILY MEDICINE CLINIC | Facility: CLINIC | Age: 42
End: 2022-12-08

## 2022-12-08 VITALS
TEMPERATURE: 98.4 F | SYSTOLIC BLOOD PRESSURE: 136 MMHG | DIASTOLIC BLOOD PRESSURE: 88 MMHG | HEIGHT: 66 IN | BODY MASS INDEX: 30.44 KG/M2 | HEART RATE: 76 BPM | OXYGEN SATURATION: 98 % | WEIGHT: 189.4 LBS

## 2022-12-08 DIAGNOSIS — M54.2 CERVICAL PAIN (NECK): ICD-10-CM

## 2022-12-08 DIAGNOSIS — G89.29 CHRONIC NECK PAIN: ICD-10-CM

## 2022-12-08 DIAGNOSIS — M51.9 FORAMINAL STENOSIS DUE TO INTERVERTEBRAL DISC DISEASE: Primary | ICD-10-CM

## 2022-12-08 DIAGNOSIS — M99.79 FORAMINAL STENOSIS DUE TO INTERVERTEBRAL DISC DISEASE: Primary | ICD-10-CM

## 2022-12-08 DIAGNOSIS — M54.2 CHRONIC NECK PAIN: ICD-10-CM

## 2022-12-08 PROCEDURE — 99213 OFFICE O/P EST LOW 20 MIN: CPT | Performed by: STUDENT IN AN ORGANIZED HEALTH CARE EDUCATION/TRAINING PROGRAM

## 2022-12-08 RX ORDER — FLUTICASONE PROPIONATE 50 MCG
1 SPRAY, SUSPENSION (ML) NASAL DAILY
COMMUNITY
Start: 2022-10-17 | End: 2023-01-10

## 2022-12-08 RX ORDER — METHOCARBAMOL 750 MG/1
750 TABLET, FILM COATED ORAL
COMMUNITY
Start: 2022-11-16 | End: 2023-01-30

## 2022-12-08 RX ORDER — CETIRIZINE HYDROCHLORIDE 10 MG/1
TABLET ORAL
COMMUNITY
Start: 2022-10-17 | End: 2023-01-10

## 2022-12-08 NOTE — PROGRESS NOTES
"  Family Medicine Residency  Marv Haywood MD    Subjective:     Gosia Brown is a 42 y.o. female who presents for history of depression, C4-7 spinal fusion on 27th September 2022 and management of post surgical feeling of imbalance.    6th December 2022 Pt reported the following:  \"I am having neck problems again started Thursday of last week. Getting dizzy when I move ot, it hurts which makes me have really bad headaches. I have called both my Dr's in [place]. One told me my equipment was I in place that I needed to see my main surgeon. Which I have been arguing with all day. I'm doing exactly what I did before I got it fixed. Well Dr Natarajan's arpns wanna say is it's not my neck I need to see you to see whats going on. I've had every test until they figured out it was my neck. I put my brace back on and it keeps me from getting dizzy have headaches and the neck pain is gone. She told.me to take it off so it's o ly gonna get worse again. So if you wanna see me over something we know is my neck let me know and I'll make an appt. Thanks\"      Answers for HPI/ROS submitted by the patient on 12/8/2022  Please describe your symptoms.: My neck is starting to hurt again. I can't keep my balance, get dizzy sometimes, all when I move my neck.  Have you had these symptoms before?: Yes  How long have you been having these symptoms?: 5-7 days  Please list any medications you are currently taking for this condition.: I'm just taking my percocet, gabapenton, Robaxin, and my regular meds  Please describe any probable cause for these symptoms. : Not sure if I did something to my neck or somethings happened to.it, or if it's the other 3 disc's not.fixed  What is the primary reason for your visit?: Other      Appointment today to discuss patients concerns      Pt BIPOLAR AND MANIC DEPRESSION continues UNDER PUNEET.      The following portions of the patient's history were reviewed and updated as appropriate: " allergies, current medications, past family history, past medical history, past social history, past surgical history and problem list.    Past Medical Hx:  Past Medical History:   Diagnosis Date   • Anemia    • Anxiety    • Arthritis    • Back problem    • Bipolar depression (HCC)    • Depression    • Diabetes mellitus (HCC) 2019    On a strict diet so i dont become one. Sugar is staying elev   • GERD (gastroesophageal reflux disease)    • Heart problem    • Heartburn    • PTSD (post-traumatic stress disorder)        Past Surgical Hx:  Past Surgical History:   Procedure Laterality Date   • COLONOSCOPY N/A 02/08/2018    Procedure: COLONOSCOPY;  Surgeon: Geoff Frank MD;  Location: [facility] ENDOSCOPY;  Service:    • COLONOSCOPY N/A 01/28/2020    Procedure: COLONOSCOPY;  Surgeon: Geoff Frank MD;  Location: [facility] ENDOSCOPY;  Service: Gastroenterology   • ENDOSCOPY N/A 02/08/2018    Procedure: ESOPHAGOGASTRODUODENOSCOPY;  Surgeon: Geoff Frank MD;  Location: [facility] ENDOSCOPY;  Service:    • ENDOSCOPY N/A 01/28/2020    Procedure: ESOPHAGOGASTRODUODENOSCOPY;  Surgeon: Geoff Frank MD;  Location: [facility] ENDOSCOPY;  Service: Gastroenterology   • EYE SURGERY Right    • SPINAL FUSION  09/27/2022   • TUBAL ABDOMINAL LIGATION         Current Meds:    Current Outpatient Medications:   •  amoxicillin (AMOXIL) 500 MG tablet, , Disp: , Rfl:   •  buPROPion XL (Wellbutrin XL) 300 MG 24 hr tablet, Take 1 tablet by mouth Every Morning., Disp: 30 tablet, Rfl: 1  •  cetirizine (zyrTEC) 10 MG tablet, TAKE 1 TABLET BY MOUTH ONCE DAILY FOR 14 DAYS, Disp: , Rfl:   •  Eliquis 5 MG tablet tablet, , Disp: , Rfl:   •  fluticasone (FLONASE) 50 MCG/ACT nasal spray, 1 spray by Each Nare route Daily., Disp: , Rfl:   •  gabapentin (NEURONTIN) 300 MG capsule, 600 mg 3 (Three) Times a Day., Disp: , Rfl:   •  l-methylfolate 7.5 MG tablet tablet, Take 1 tablet by mouth Daily for 90 days., Disp: 30 tablet, Rfl: 2  •  lamoTRIgine (LaMICtal)  25 MG tablet, Take 1 oral tablet daily for 2 weeks then increase to 2 tablets. Week 5 increase to 3 tablets., Disp: 90 tablet, Rfl: 0  •  losartan (COZAAR) 50 MG tablet, Take 1 tablet by mouth Daily., Disp: , Rfl:   •  meloxicam (Mobic) 15 MG tablet, Take 1 tablet by mouth Daily., Disp: 30 tablet, Rfl: 0  •  methocarbamol (ROBAXIN) 500 MG tablet, Take 3 tablets by mouth 4 (Four) Times a Day., Disp: 120 tablet, Rfl: 2  •  methocarbamol (ROBAXIN) 750 MG tablet, Take 750 mg by mouth., Disp: , Rfl:   •  metoprolol succinate XL (TOPROL-XL) 50 MG 24 hr tablet, Take 50 mg by mouth 2 (Two) Times a Day., Disp: , Rfl:   •  oxyCODONE-acetaminophen (PERCOCET) 5-325 MG per tablet, Take 1 tablet by mouth 2 (Two) Times a Day., Disp: , Rfl:     Allergies:  Allergies   Allergen Reactions   • Regadenoson Unknown - High Severity     Seizures, petit mal   • Hydrocodone-Acetaminophen Itching       Family Hx:  Family History   Problem Relation Age of Onset   • ADD / ADHD Mother    • Arthritis Mother    • Hypertension Mother    • Ovarian cancer Mother    • Bipolar disorder Father    • Breast cancer Maternal Aunt    • Lung cancer Maternal Aunt    • Breast cancer Paternal Aunt    • Colon cancer Maternal Uncle    • Lung cancer Maternal Uncle    • Heart disease Maternal Grandfather    • Stroke Maternal Grandfather    • Cancer Maternal Grandfather    • Lung cancer Maternal Grandfather    • Cancer Maternal Grandmother    • Bipolar disorder Son    • Bipolar disorder Daughter         Social History:  Social History     Socioeconomic History   • Marital status: Single   • Number of children: 3   • Highest education level: Some college, no degree   Tobacco Use   • Smoking status: Never   • Smokeless tobacco: Never   Substance and Sexual Activity   • Alcohol use: No   • Drug use: No   • Sexual activity: Defer     Birth control/protection: I.U.D., Tubal ligation       Review of Systems  Review of Systems   Constitutional: Negative for activity  "change, appetite change, chills, diaphoresis, fatigue and fever.   HENT: Negative for congestion, dental problem, ear discharge, ear pain, hearing loss, mouth sores, nosebleeds, postnasal drip, sinus pain, sore throat, tinnitus, trouble swallowing and voice change.    Eyes: Negative for photophobia, pain, discharge and itching.   Respiratory: Negative for cough, choking, chest tightness, shortness of breath and wheezing.    Cardiovascular: Negative for chest pain, palpitations and leg swelling.   Gastrointestinal: Negative for abdominal distention, abdominal pain, blood in stool, constipation, diarrhea, nausea and vomiting.   Endocrine: Negative for cold intolerance and heat intolerance.   Genitourinary: Negative for difficulty urinating, dysuria, flank pain and hematuria.   Musculoskeletal: Positive for back pain, neck pain and neck stiffness. Negative for joint swelling.   Skin: Negative for color change and rash.   Neurological: Positive for numbness. Negative for dizziness, tremors, seizures, weakness, light-headedness and headaches.        RIGHT hand numbness intermittently   Hematological: Negative for adenopathy.   Psychiatric/Behavioral: Negative for behavioral problems, confusion, hallucinations, self-injury and sleep disturbance.       Objective:     /88   Pulse 76   Temp 98.4 °F (36.9 °C)   Ht 167.6 cm (66\")   Wt 85.9 kg (189 lb 6.4 oz)   SpO2 98%   BMI 30.57 kg/m²   Physical Exam  Vitals and nursing note reviewed.   Constitutional:       Appearance: She is obese. She is not ill-appearing or diaphoretic.   HENT:      Head: Normocephalic and atraumatic.      Right Ear: External ear normal.      Left Ear: External ear normal.      Nose: Nose normal. No rhinorrhea.      Mouth/Throat:      Mouth: Mucous membranes are moist.      Pharynx: No posterior oropharyngeal erythema.   Eyes:      General: No scleral icterus.        Right eye: No discharge.         Left eye: No discharge.      Extraocular " Movements: Extraocular movements intact.   Neck:      Vascular: No carotid bruit.   Cardiovascular:      Rate and Rhythm: Normal rate and regular rhythm.      Pulses: Normal pulses.      Heart sounds: Normal heart sounds.     No gallop.   Pulmonary:      Effort: Pulmonary effort is normal.      Breath sounds: Normal breath sounds. No wheezing.   Chest:      Chest wall: No tenderness.   Abdominal:      General: Bowel sounds are normal.      Palpations: Abdomen is soft.      Tenderness: There is no rebound.   Musculoskeletal:         General: No swelling.        Arms:       Cervical back: No muscular tenderness.      Right lower leg: No edema.      Left lower leg: No edema.   Lymphadenopathy:      Cervical: No cervical adenopathy.   Skin:     General: Skin is warm and dry.      Capillary Refill: Capillary refill takes less than 2 seconds.      Findings: No erythema or rash.   Neurological:      General: No focal deficit present.      Mental Status: She is alert and oriented to person, place, and time.      Motor: No weakness.   Psychiatric:         Mood and Affect: Mood normal.         Behavior: Behavior normal.         Thought Content: Thought content normal.         Judgment: Judgment normal.          Assessment   Gosia Brown is a 42 y.o. female who presents for:    Post surgical dizziness:  Patient under Dr. Esparza at [place] IN JAN 2023  Patient continues under surgeon in [place] for cervical spine surgery.  C4-7 spinal fusion on 27th September 2022.  · Patient advised to call her surgeon in [place]  · Patient advised to go to the ED should her symptoms worsen  · Patient in full agreement with the plan of care    Atrial fibrillation:  Referred onto cardiology for intervention, Dr. Velásquez     Controlled substances Yogi:  Yogi: PDMP  Patient has gabapentin and oxycodone refilled by [facility].  As per previous visit patient educated in relation to that these medications  would not be refilled at this clinic. Patient agreement and continues to understand.    History of depression:  Treatment initiated based off [employer] testing results  The patient was started on the followin2022  Wellbutrin 150 mg XL 60 days  L-methyl folate 7.5 mg  Patient has been reviewed by psychiatry HARIS Kim  Patient referred onto Dr. Servin psychology  Pt BIPOLAR AND MANIC DEPRESSION continues UNDER PUNEET.    The patient was educated on the risks, benefits and alternatives to therapy. The patient was in agreement with therapy and plan of care.    Plan     Next visit discussed with patient plan of care  Next visit check bipolar and manic depression  Next visit check patients medication regime.  Next visit check patients compliance to medication regime.    Diagnoses and all orders for this visit:    1. Foraminal stenosis due to intervertebral disc disease (Primary)    2. Cervical pain (neck)    3. Chronic neck pain      · Rx changes: As above  · Patient Education: Advice and education to best managment  · Compliance at present is estimated to be fair.   · Efforts to improve compliance (if necessary) will be directed at increased exercise.    Depression screening: Up to date; last screen 2022      PHQ-9 Depression Screening  Little interest or pleasure in doing things?  0   Feeling down, depressed, or hopeless?  1   Trouble falling or staying asleep, or sleeping too much?  0   Feeling tired or having little energy?  1   Poor appetite or overeating?  0   Feeling bad about yourself - or that you are a failure or have let yourself or your family down?  0   Trouble concentrating on things, such as reading the newspaper or watching television?  0   Moving or speaking so slowly that other people could have noticed? Or the opposite - being so fidgety or restless that you have been moving around a lot more than usual?  0   Thoughts that you would be better off dead, or of hurting yourself in some way?  0    PHQ-9 Total Score  2   If you checked off any problems, how difficult have these problems made it for you to do your work, take care of things at home, or get along with other people?  Intermittently      PHQ-9 Total Score:    2    Patient has no suicidal, no homicidal ideation.    Pt BIPOLAR AND MANIC DEPRESSION continues UNDER PUNEET.    Follow-up:     Return in about 8 weeks (around 2/2/2023) for Recheck.    Preventative:  Health Maintenance   Topic Date Due   • ANNUAL PHYSICAL  Never done   • COVID-19 Vaccine (3 - Booster for Pfizer series) 03/25/2022   • PAP SMEAR  09/04/2023   • COLORECTAL CANCER SCREENING  01/28/2025   • TDAP/TD VACCINES (2 - Td or Tdap) 10/14/2029   • HEPATITIS C SCREENING  Completed   • INFLUENZA VACCINE  Completed   • Pneumococcal Vaccine 0-64  Aged Out     Female Preventative: Does monthly breast self examination  Recommended: none  Vaccine Counseling: N/A    Weight  -Class: Normal: 18.5-24.9kg/m2  -Class 2 Severe Obesity (BMI >=35 and <=39.9). Obesity-related health conditions include the following: hypertension. Obesity is unchanged. BMI is is above average; BMI management plan is completed. We discussed portion control and increasing exercise.   eat more fruits and vegetables, decrease soda or juice intake, increase water intake, increase physical activity, reduce screen time, reduce portion size, cut out extra servings and reduce fast food intake    Alcohol use:  reports no history of alcohol use.  Nicotine status  reports that she has never smoked. She has never used smokeless tobacco.     Goals    None         RISK SCORE: 3    Yogi request # PDMP. Reviewed and appropriate.     [place] [address]  Office: [phone]      This document has been electronically signed by Marv Haywood MD on December 8, 2022 10:41 CST      Portions of this note were copied from progress note written by   [employer]; note reviewed and updated as appropriate.    Part of this note may be an electronic transcription/translation of spoken language to printed text using the Dragon Dictation System.

## 2022-12-14 ENCOUNTER — TELEMEDICINE (OUTPATIENT)
Dept: PSYCHIATRY | Facility: CLINIC | Age: 42
End: 2022-12-14

## 2022-12-14 DIAGNOSIS — L27.0 DRUG-INDUCED SKIN RASH: Primary | ICD-10-CM

## 2022-12-14 DIAGNOSIS — F31.30 BIPOLAR I DISORDER, MOST RECENT EPISODE DEPRESSED: ICD-10-CM

## 2022-12-14 PROCEDURE — 99214 OFFICE O/P EST MOD 30 MIN: CPT | Performed by: NURSE PRACTITIONER

## 2022-12-14 RX ORDER — LAMOTRIGINE 25 MG/1
TABLET ORAL
Qty: 90 TABLET | Refills: 0 | OUTPATIENT
Start: 2022-12-14

## 2022-12-14 RX ORDER — HYDROXYZINE 50 MG/1
50 TABLET, FILM COATED ORAL 3 TIMES DAILY PRN
Qty: 90 TABLET | Refills: 0 | Status: SHIPPED | OUTPATIENT
Start: 2022-12-14 | End: 2023-01-10

## 2022-12-14 RX ORDER — HYDROXYZINE 50 MG/1
50 TABLET, FILM COATED ORAL 3 TIMES DAILY PRN
Qty: 90 TABLET | Refills: 0 | Status: SHIPPED | OUTPATIENT
Start: 2022-12-14 | End: 2022-12-14 | Stop reason: SDUPTHER

## 2022-12-14 NOTE — PATIENT INSTRUCTIONS
Discontinue lamotrigine  Start hydroxyzine 50 mg 3 times a day as needed for the itching  Continue to monitor rash to make sure it does not spread   Go to urgent care if no better in 24 hours.  May need to be treated with steroids.  Follow-up with this provider in 7 days.  For concerns or needing assistance call the [facility] at [phone]

## 2022-12-14 NOTE — TELEPHONE ENCOUNTER
This patient was evaluated by this provider 12/14/2022 and lamotrigine was discontinued due to complaint of side effect of rash.  Pharmacy was notified to discontinue this order
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
## 2022-12-15 ENCOUNTER — TELEPHONE (OUTPATIENT)
Dept: PSYCHIATRY | Facility: CLINIC | Age: 42
End: 2022-12-15

## 2022-12-15 NOTE — TELEPHONE ENCOUNTER
"Patient took Hydroxyzine last evening and woke up today with Blisters all over her face \"itching like crazy\" also has dry mouth and  severe heartburn.     Please Advise?      Thank You      "

## 2022-12-16 NOTE — TELEPHONE ENCOUNTER
"I called pt at 0949 Central time 12/16/2022. She went to urgent care yesterday \"[facility]\". Got a low dose steroid shot and has f/u appt with me on Tuesday. She was instructed to f/u with her PCP next week as well and to go back to urgent care if not improving. "

## 2022-12-20 ENCOUNTER — TELEMEDICINE (OUTPATIENT)
Dept: PSYCHIATRY | Facility: CLINIC | Age: 42
End: 2022-12-20

## 2022-12-20 DIAGNOSIS — L27.0 DRUG-INDUCED SKIN RASH: ICD-10-CM

## 2022-12-20 DIAGNOSIS — F31.30 BIPOLAR I DISORDER, MOST RECENT EPISODE DEPRESSED: Primary | ICD-10-CM

## 2022-12-20 PROCEDURE — 99214 OFFICE O/P EST MOD 30 MIN: CPT | Performed by: NURSE PRACTITIONER

## 2022-12-20 RX ORDER — LURASIDONE HYDROCHLORIDE 20 MG/1
20 TABLET, FILM COATED ORAL DAILY
Qty: 30 TABLET | Refills: 0 | Status: SHIPPED | OUTPATIENT
Start: 2022-12-20 | End: 2023-01-10
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
## 2022-12-21 NOTE — PATIENT INSTRUCTIONS
Continue Wellbutrin 300 XL every morning  Continue hydroxyzine 50 mg tablet up to 3 times a day for the itching from the rash  Start Latuda 20 mg in the evening with meal.

## 2023-01-10 ENCOUNTER — PRIOR AUTHORIZATION (OUTPATIENT)
Dept: PSYCHIATRY | Facility: CLINIC | Age: 43
End: 2023-01-10
Payer: COMMERCIAL

## 2023-01-10 ENCOUNTER — TELEMEDICINE (OUTPATIENT)
Dept: PSYCHIATRY | Facility: CLINIC | Age: 43
End: 2023-01-10
Payer: COMMERCIAL

## 2023-01-10 DIAGNOSIS — F31.30 BIPOLAR I DISORDER, MOST RECENT EPISODE DEPRESSED: Primary | ICD-10-CM

## 2023-01-10 PROCEDURE — 99214 OFFICE O/P EST MOD 30 MIN: CPT | Performed by: NURSE PRACTITIONER

## 2023-01-10 RX ORDER — LUMATEPERONE 42 MG/1
1 CAPSULE ORAL DAILY
Qty: 30 CAPSULE | Refills: 0 | Status: SHIPPED | OUTPATIENT
Start: 2023-01-10 | End: 2023-02-09
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
## 2023-01-26 ENCOUNTER — TELEPHONE (OUTPATIENT)
Dept: PSYCHIATRY | Facility: CLINIC | Age: 43
End: 2023-01-26
Payer: COMMERCIAL

## 2023-01-26 DIAGNOSIS — F31.30 BIPOLAR I DISORDER, MOST RECENT EPISODE DEPRESSED: Primary | ICD-10-CM

## 2023-01-26 RX ORDER — OXCARBAZEPINE 300 MG/1
TABLET, FILM COATED ORAL
Qty: 30 TABLET | Refills: 0 | Status: SHIPPED | OUTPATIENT
Start: 2023-01-26 | End: 2023-02-01 | Stop reason: SDUPTHER

## 2023-01-26 NOTE — PATIENT INSTRUCTIONS
Order sent to [facility] in [place].   Monitor for side effects or rash development.   Keep upcoming appt February 1, 2023 At 2 PM Central Time

## 2023-01-26 NOTE — PROGRESS NOTES
"Appeals have been denied for Caplyta. Trying one more time to get approval. Pt requested another medication d/t mood is \"all over the place.\"  Trileptal sent to pharmacy on file.   "

## 2023-01-30 ENCOUNTER — OFFICE VISIT (OUTPATIENT)
Dept: FAMILY MEDICINE CLINIC | Facility: CLINIC | Age: 43
End: 2023-01-30
Payer: COMMERCIAL

## 2023-01-30 VITALS
OXYGEN SATURATION: 98 % | TEMPERATURE: 97.1 F | DIASTOLIC BLOOD PRESSURE: 78 MMHG | BODY MASS INDEX: 30.53 KG/M2 | WEIGHT: 190 LBS | HEART RATE: 99 BPM | SYSTOLIC BLOOD PRESSURE: 122 MMHG | HEIGHT: 66 IN

## 2023-01-30 DIAGNOSIS — F33.41 RECURRENT MAJOR DEPRESSIVE DISORDER, IN PARTIAL REMISSION: ICD-10-CM

## 2023-01-30 DIAGNOSIS — I48.91 ATRIAL FIBRILLATION, UNSPECIFIED TYPE: ICD-10-CM

## 2023-01-30 DIAGNOSIS — M54.2 CERVICAL PAIN (NECK): ICD-10-CM

## 2023-01-30 DIAGNOSIS — N39.0 URINARY TRACT INFECTION WITHOUT HEMATURIA, SITE UNSPECIFIED: ICD-10-CM

## 2023-01-30 DIAGNOSIS — R35.0 URINARY FREQUENCY: Primary | ICD-10-CM

## 2023-01-30 LAB
BILIRUB BLD-MCNC: NEGATIVE MG/DL
CLARITY, POC: CLEAR
COLOR UR: YELLOW
GLUCOSE UR STRIP-MCNC: NEGATIVE MG/DL
KETONES UR QL: NEGATIVE
LEUKOCYTE EST, POC: ABNORMAL
NITRITE UR-MCNC: NEGATIVE MG/ML
PH UR: 8.5 [PH] (ref 5–8)
PROT UR STRIP-MCNC: ABNORMAL MG/DL
RBC # UR STRIP: NEGATIVE /UL
SP GR UR: 1.02 (ref 1–1.03)
UROBILINOGEN UR QL: ABNORMAL

## 2023-01-30 PROCEDURE — 99213 OFFICE O/P EST LOW 20 MIN: CPT | Performed by: STUDENT IN AN ORGANIZED HEALTH CARE EDUCATION/TRAINING PROGRAM

## 2023-01-30 RX ORDER — VALSARTAN 80 MG/1
80 TABLET ORAL DAILY
COMMUNITY
Start: 2023-01-17 | End: 2024-01-18

## 2023-01-30 RX ORDER — BACLOFEN 20 MG/1
1 TABLET ORAL EVERY 6 HOURS
COMMUNITY
Start: 2023-01-17

## 2023-01-30 RX ORDER — ROSUVASTATIN CALCIUM 20 MG/1
20 TABLET, COATED ORAL EVERY EVENING
COMMUNITY
Start: 2023-01-14

## 2023-01-30 RX ORDER — NITROFURANTOIN 25; 75 MG/1; MG/1
100 CAPSULE ORAL 2 TIMES DAILY
Qty: 14 CAPSULE | Refills: 0 | Status: SHIPPED | OUTPATIENT
Start: 2023-01-30 | End: 2023-02-06

## 2023-01-30 RX ORDER — GABAPENTIN 600 MG/1
600 TABLET ORAL 3 TIMES DAILY
COMMUNITY
Start: 2022-12-13

## 2023-01-30 RX ORDER — OXYCODONE AND ACETAMINOPHEN 7.5; 325 MG/1; MG/1
1 TABLET ORAL
COMMUNITY
Start: 2022-12-20

## 2023-01-30 NOTE — PROGRESS NOTES
I have seen the patient and I have reviewed the notes, assessments, and/or procedures performed by Marv Haywood MD during office visit. I concur with her/his documentation and assessment and plan for Gosia Brown.           This document has been electronically signed by Rodrick Giron MD on January 30, 2023 16:23 CST

## 2023-01-30 NOTE — PROGRESS NOTES
"  Family Medicine Residency  Marv Haywood MD    Subjective:     Gosia Brown is a 42 y.o. female who presents for symptoms of UTI \"frequency, urgency, started 2-3 days ago\", history of depression, C4-7 spinal fusion on 27th September 2022.    UTI:  Pt has had good benefit from Macrobid (nitrofurantoin) in the past  Pt would like this medication today as worked well in the past.    The following portions of the patient's history were reviewed and updated as appropriate: allergies, current medications, past family history, past medical history, past social history, past surgical history and problem list.    Past Medical Hx:  Past Medical History:   Diagnosis Date   • Anemia    • Anxiety    • Arthritis    • Back problem    • Bipolar depression (HCC)    • Chronic pain disorder 2003   • Depression    • Diabetes mellitus (HCC) 2019    On a strict diet so i dont become one. Sugar is staying elev   • GERD (gastroesophageal reflux disease)    • Heart problem    • Heartburn    • PTSD (post-traumatic stress disorder)        Past Surgical Hx:  Past Surgical History:   Procedure Laterality Date   • COLONOSCOPY N/A 02/08/2018    Procedure: COLONOSCOPY;  Surgeon: Geoff Frank MD;  Location: [facility] ENDOSCOPY;  Service:    • COLONOSCOPY N/A 01/28/2020    Procedure: COLONOSCOPY;  Surgeon: Geoff Frank MD;  Location: [facility] ENDOSCOPY;  Service: Gastroenterology   • ENDOSCOPY N/A 02/08/2018    Procedure: ESOPHAGOGASTRODUODENOSCOPY;  Surgeon: Geoff Frank MD;  Location: [facility] ENDOSCOPY;  Service:    • ENDOSCOPY N/A 01/28/2020    Procedure: ESOPHAGOGASTRODUODENOSCOPY;  Surgeon: Geoff Frank MD;  Location: [facility] ENDOSCOPY;  Service: Gastroenterology   • EYE SURGERY Right    • SPINAL FUSION  09/27/2022   • TUBAL ABDOMINAL LIGATION         Current Meds:    Current Outpatient Medications:   •  gabapentin (NEURONTIN) 600 MG tablet, Take 600 mg by mouth 3 (Three) Times a Day., Disp: , Rfl:   •  " oxyCODONE-acetaminophen (PERCOCET) 7.5-325 MG per tablet, Take 1 tablet by mouth., Disp: , Rfl:   •  valsartan (DIOVAN) 80 MG tablet, Take 80 mg by mouth Daily., Disp: , Rfl:   •  baclofen (LIORESAL) 20 MG tablet, Take 1 tablet by mouth Every 6 (Six) Hours., Disp: , Rfl:   •  buPROPion XL (Wellbutrin XL) 300 MG 24 hr tablet, Take 1 tablet by mouth Every Morning., Disp: 30 tablet, Rfl: 1  •  Eliquis 5 MG tablet tablet, , Disp: , Rfl:   •  gabapentin (NEURONTIN) 300 MG capsule, 600 mg 3 (Three) Times a Day., Disp: , Rfl:   •  Lumateperone Tosylate (Caplyta) 42 MG capsule, Take 1 capsule by mouth Daily for 30 days., Disp: 30 capsule, Rfl: 0  •  metoprolol succinate XL (TOPROL-XL) 50 MG 24 hr tablet, Take 50 mg by mouth 2 (Two) Times a Day., Disp: , Rfl:   •  nitrofurantoin, macrocrystal-monohydrate, (Macrobid) 100 MG capsule, Take 1 capsule by mouth 2 (Two) Times a Day for 7 days., Disp: 14 capsule, Rfl: 0  •  OXcarbazepine (Trileptal) 300 MG tablet, Take one oral tablet at bedtime for 2 nights then increase to one tablet in AM and one PM for mood stabilization, Disp: 30 tablet, Rfl: 0  •  oxyCODONE-acetaminophen (PERCOCET) 5-325 MG per tablet, Take 1 tablet by mouth 2 (Two) Times a Day., Disp: , Rfl:   •  rosuvastatin (CRESTOR) 20 MG tablet, Take 20 mg by mouth Every Evening., Disp: , Rfl:     Allergies:  Allergies   Allergen Reactions   • Regadenoson Unknown - High Severity     Seizures, petit mal  Other reaction(s): Unknown - High Severity  Seizures, petit mal   • Lamotrigine Rash   • Hydrocodone-Acetaminophen Itching       Family Hx:  Family History   Problem Relation Age of Onset   • ADD / ADHD Mother    • Arthritis Mother    • Hypertension Mother    • Ovarian cancer Mother    • Bipolar disorder Father    • Breast cancer Maternal Aunt    • Lung cancer Maternal Aunt    • Breast cancer Paternal Aunt    • Colon cancer Maternal Uncle    • Lung cancer Maternal Uncle    • Heart disease Maternal Grandfather    • Stroke  Maternal Grandfather    • Cancer Maternal Grandfather    • Lung cancer Maternal Grandfather    • Cancer Maternal Grandmother    • Bipolar disorder Son    • Bipolar disorder Daughter         Social History:  Social History     Socioeconomic History   • Marital status: Single   • Number of children: 3   • Highest education level: Some college, no degree   Tobacco Use   • Smoking status: Never   • Smokeless tobacco: Never   Substance and Sexual Activity   • Alcohol use: No   • Drug use: No   • Sexual activity: Defer     Birth control/protection: I.U.D., Tubal ligation       Review of Systems  Review of Systems   Constitutional: Negative for activity change, appetite change, chills, diaphoresis, fatigue and fever.   HENT: Negative for congestion, dental problem, ear discharge, ear pain, hearing loss, mouth sores, nosebleeds, postnasal drip, sinus pain, sore throat, tinnitus, trouble swallowing and voice change.    Eyes: Negative for photophobia, pain, discharge and itching.   Respiratory: Negative for cough, choking, chest tightness, shortness of breath and wheezing.    Cardiovascular: Negative for chest pain, palpitations and leg swelling.   Gastrointestinal: Negative for abdominal distention, abdominal pain, blood in stool, constipation, diarrhea, nausea and vomiting.   Endocrine: Negative for cold intolerance and heat intolerance.   Genitourinary: Positive for frequency and urgency. Negative for difficulty urinating, dysuria, flank pain and hematuria.   Musculoskeletal: Negative for back pain, joint swelling and neck pain.   Skin: Negative for color change and rash.   Neurological: Negative for dizziness, tremors, seizures, weakness, light-headedness, numbness and headaches.   Hematological: Negative for adenopathy.   Psychiatric/Behavioral: Negative for behavioral problems, confusion, hallucinations, self-injury and sleep disturbance.       Objective:     /78   Pulse 99   Temp 97.1 °F (36.2 °C)   Ht 167.6  "cm (66\")   Wt 86.2 kg (190 lb)   SpO2 98%   BMI 30.67 kg/m²   Physical Exam  Vitals and nursing note reviewed.   Constitutional:       Appearance: Normal appearance. She is not ill-appearing or diaphoretic.   HENT:      Head: Normocephalic and atraumatic.      Right Ear: External ear normal.      Left Ear: External ear normal.      Nose: Nose normal. No rhinorrhea.      Mouth/Throat:      Mouth: Mucous membranes are moist.      Pharynx: No posterior oropharyngeal erythema.   Eyes:      General: No scleral icterus.        Right eye: No discharge.         Left eye: No discharge.      Extraocular Movements: Extraocular movements intact.   Neck:      Vascular: No carotid bruit.   Cardiovascular:      Rate and Rhythm: Normal rate and regular rhythm.      Pulses: Normal pulses.      Heart sounds: Normal heart sounds.     No gallop.   Pulmonary:      Effort: Pulmonary effort is normal.      Breath sounds: Normal breath sounds. No wheezing.   Chest:      Chest wall: No tenderness.   Abdominal:      General: Bowel sounds are normal.      Palpations: Abdomen is soft.      Tenderness: There is no rebound.   Genitourinary:     Comments: Urgency  Symptoms of UTI  Musculoskeletal:         General: No swelling.        Arms:       Cervical back: No muscular tenderness.      Right lower leg: No edema.      Left lower leg: No edema.   Lymphadenopathy:      Cervical: No cervical adenopathy.   Skin:     General: Skin is warm and dry.      Capillary Refill: Capillary refill takes less than 2 seconds.      Findings: No erythema or rash.   Neurological:      General: No focal deficit present.      Mental Status: She is alert and oriented to person, place, and time.      Motor: No weakness.   Psychiatric:         Mood and Affect: Mood normal.         Behavior: Behavior normal.         Thought Content: Thought content normal.         Judgment: Judgment normal.          Assessment   Gosia Brown is a 42 y.o. female who presents " for:    UTI: (Chief complaint 1/30/2023)  Positive dipstick in office  Macrobid (nitrofurantoin) 100 mg PO BID by 7 days (previously tolerated, no side effects/interactions)  Patient advised to contact clinic should her symptoms not resolve  Patient educated to attend ED should her symptoms worsen or develop severe flank pain  No fever noted today in office.  No flank pain in office today    Cervical fusion:  Patient under Dr. Esparza at [facility] NEXT APPOINTMENT IN MARCH/APRIL 2023  Patient continues under surgeon in [place] for cervical spine surgery.  C4-7 spinal fusion on 27th September 2022.  • Patient advised to call her surgeon in [place] IF ISSUES  • Patient advised to go to the ED should her symptoms worsen WITH NECK PAIN   • Patient in full agreement with the plan of care     Atrial fibrillation:  Referred onto cardiology for intervention, Dr. Velásquez     Controlled substances Yogi:  Yogi: PDMP  Patient has gabapentin and oxycodone refilled by [facility].  As per previous visit patient educated in relation to that these medications would not be refilled at this clinic. Patient agreement and continues to understand.     History of depression:  Pt BIPOLAR AND MANIC DEPRESSION continues UNDER JULIO.  Julio manage medication and talk therapy  Has review with  August 2023    The patient was educated on the risks, benefits and alternatives to therapy. The patient was in agreement with therapy and plan of care.    Plan     Next visit CX spine review status  Next visit Mood/depression/bipolar review status with Julio  Next visit check patients medication regime.  Next visit check patients compliance to medication regime.    Diagnoses and all orders for this visit:    1. Urinary frequency (Primary)  -     POCT urinalysis dipstick, manual    2. Cervical pain (neck)    3. Atrial fibrillation, unspecified type (HCC)    4. Recurrent major depressive disorder, in partial remission (HCC)    5.  Urinary tract infection without hematuria, site unspecified  -     nitrofurantoin, macrocrystal-monohydrate, (Macrobid) 100 MG capsule; Take 1 capsule by mouth 2 (Two) Times a Day for 7 days.  Dispense: 14 capsule; Refill: 0    · Rx changes: As Above  · Patient Education: Advised and educated the patient in relation to diet, lifestyle, healthcare goals and educated on UTI.  · Compliance at present is estimated to be fair.   · Efforts to improve compliance (if necessary) will be directed at increased exercise.    Depression screening: Up to date; last screen 8/26/2022       PHQ-2 Depression Screening  Little interest or pleasure in doing things?  1   Feeling down, depressed, or hopeless?  1   PHQ-2 Total Score  2       Patient has no suicidal, no homicidal ideation.    Pt BIPOLAR AND MANIC DEPRESSION continues UNDER PUNEET Jan 2023    Follow-up:     Return in about 3 months (around 4/30/2023) for Recheck.    Preventative:  Health Maintenance   Topic Date Due   • ANNUAL PHYSICAL  Never done   • COVID-19 Vaccine (3 - Booster for Pfizer series) 03/25/2022   • PAP SMEAR  09/04/2023   • COLORECTAL CANCER SCREENING  01/28/2025   • TDAP/TD VACCINES (2 - Td or Tdap) 10/14/2029   • HEPATITIS C SCREENING  Completed   • INFLUENZA VACCINE  Completed   • Pneumococcal Vaccine 0-64  Aged Out     Female Preventative: Does monthly breast self examination  Recommended: none  Vaccine Counseling: N/A    Weight  -Class: Obese Class I: 30-34.9kg/m2  -BMI is >= 30 and <35. (Class 1 Obesity). The following options were offered after discussion;: weight loss educational material (shared in after visit summary), exercise counseling/recommendations and nutrition counseling/recommendations   eat more fruits and vegetables, decrease soda or juice intake, increase water intake, increase physical activity, reduce screen time, reduce portion size and cut out extra servings    Alcohol use:  reports no history of alcohol use.  Nicotine status   reports that she has never smoked. She has never used smokeless tobacco.     Goals    None         RISK SCORE: 3    Yogi request #PDMP. Reviewed and appropriate.     Signature  Mrav Haywood MD  [place] [id number]  Office: [phone]      This document has been electronically signed by Marv Haywood MD on January 30, 2023 09:41 CST      Portions of this note were copied from progress note written by Dr. Haywood; note reviewed and updated as appropriate.    Part of this note may be an electronic transcription/translation of spoken language to printed text using the Dragon Dictation System.

## 2023-02-01 ENCOUNTER — TELEMEDICINE (OUTPATIENT)
Dept: PSYCHIATRY | Facility: CLINIC | Age: 43
End: 2023-02-01
Payer: COMMERCIAL

## 2023-02-01 DIAGNOSIS — F31.30 BIPOLAR I DISORDER, MOST RECENT EPISODE DEPRESSED: ICD-10-CM

## 2023-02-01 PROCEDURE — 99214 OFFICE O/P EST MOD 30 MIN: CPT | Performed by: NURSE PRACTITIONER

## 2023-02-01 RX ORDER — OXCARBAZEPINE 300 MG/1
TABLET, FILM COATED ORAL
Qty: 60 TABLET | Refills: 1 | Status: SHIPPED | OUTPATIENT
Start: 2023-02-01 | End: 2023-02-14 | Stop reason: SDUPTHER

## 2023-02-01 RX ORDER — BUPROPION HYDROCHLORIDE 300 MG/1
300 TABLET ORAL EVERY MORNING
Qty: 30 TABLET | Refills: 2 | Status: SHIPPED | OUTPATIENT
Start: 2023-02-01
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
## 2023-02-01 NOTE — TELEPHONE ENCOUNTER
I have called [employer] and spoke with a representative in regards to the appeal. Representative states the appeal is still under review.

## 2023-02-14 DIAGNOSIS — F31.30 BIPOLAR I DISORDER, MOST RECENT EPISODE DEPRESSED: ICD-10-CM

## 2023-02-14 RX ORDER — OXCARBAZEPINE 300 MG/1
TABLET, FILM COATED ORAL
Qty: 60 TABLET | Refills: 1 | Status: SHIPPED | OUTPATIENT
Start: 2023-02-14 | End: 2023-03-13

## 2023-02-14 NOTE — TELEPHONE ENCOUNTER
Patient stated that pharmacy did not receive the prescription for oxycarbezine.  I called pharmacy to confirm this.  Patient would like for medication to be resubmitted.  Please advise.

## 2023-03-02 ENCOUNTER — TELEMEDICINE (OUTPATIENT)
Dept: PSYCHIATRY | Facility: CLINIC | Age: 43
End: 2023-03-02
Payer: COMMERCIAL

## 2023-03-02 DIAGNOSIS — F31.30 BIPOLAR I DISORDER, MOST RECENT EPISODE DEPRESSED: Primary | ICD-10-CM

## 2023-03-02 PROCEDURE — 99214 OFFICE O/P EST MOD 30 MIN: CPT | Performed by: NURSE PRACTITIONER
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
## 2023-03-13 ENCOUNTER — TELEMEDICINE (OUTPATIENT)
Dept: PSYCHIATRY | Facility: CLINIC | Age: 43
End: 2023-03-13
Payer: COMMERCIAL

## 2023-03-13 DIAGNOSIS — F31.30 BIPOLAR I DISORDER, MOST RECENT EPISODE DEPRESSED: ICD-10-CM

## 2023-03-13 DIAGNOSIS — F41.1 GAD (GENERALIZED ANXIETY DISORDER): Primary | ICD-10-CM

## 2023-03-13 PROCEDURE — 1159F MED LIST DOCD IN RCRD: CPT | Performed by: NURSE PRACTITIONER

## 2023-03-13 PROCEDURE — 1160F RVW MEDS BY RX/DR IN RCRD: CPT | Performed by: NURSE PRACTITIONER

## 2023-03-13 PROCEDURE — 99214 OFFICE O/P EST MOD 30 MIN: CPT | Performed by: NURSE PRACTITIONER

## 2023-03-13 RX ORDER — OXCARBAZEPINE 600 MG/1
TABLET, FILM COATED ORAL
Qty: 60 TABLET | Refills: 2 | Status: SHIPPED | OUTPATIENT
Start: 2023-03-13

## 2023-03-13 RX ORDER — BUSPIRONE HYDROCHLORIDE 10 MG/1
10 TABLET ORAL 3 TIMES DAILY
Qty: 90 TABLET | Refills: 2 | Status: SHIPPED | OUTPATIENT
Start: 2023-03-13
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
## 2023-03-13 NOTE — PATIENT INSTRUCTIONS
Continue Wellbutrin 300 XL every morning  Increase oxcarbazepine to 600mg BID  Start Buspar 10mg TID for anxiety

## 2023-03-27 ENCOUNTER — TRANSCRIBE ORDERS (OUTPATIENT)
Dept: PHYSICAL THERAPY | Facility: HOSPITAL | Age: 43
End: 2023-03-27
Payer: COMMERCIAL

## 2023-03-27 DIAGNOSIS — M54.2 NECK PAIN: Primary | ICD-10-CM

## 2023-03-27 DIAGNOSIS — M62.838 MUSCLE SPASMS OF NECK: ICD-10-CM

## 2023-04-10 ENCOUNTER — TELEMEDICINE (OUTPATIENT)
Dept: PSYCHIATRY | Facility: CLINIC | Age: 43
End: 2023-04-10
Payer: COMMERCIAL

## 2023-04-10 DIAGNOSIS — F41.1 GAD (GENERALIZED ANXIETY DISORDER): Primary | ICD-10-CM

## 2023-04-10 DIAGNOSIS — F31.30 BIPOLAR I DISORDER, MOST RECENT EPISODE DEPRESSED: ICD-10-CM

## 2023-04-10 PROCEDURE — 1160F RVW MEDS BY RX/DR IN RCRD: CPT | Performed by: NURSE PRACTITIONER

## 2023-04-10 PROCEDURE — 1159F MED LIST DOCD IN RCRD: CPT | Performed by: NURSE PRACTITIONER

## 2023-04-10 PROCEDURE — 99214 OFFICE O/P EST MOD 30 MIN: CPT | Performed by: NURSE PRACTITIONER

## 2023-04-10 RX ORDER — BUPROPION HYDROCHLORIDE 300 MG/1
300 TABLET ORAL EVERY MORNING
Qty: 30 TABLET | Refills: 2 | Status: SHIPPED | OUTPATIENT
Start: 2023-04-10
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
## 2023-04-24 ENCOUNTER — HOSPITAL ENCOUNTER (OUTPATIENT)
Dept: PHYSICAL THERAPY | Facility: HOSPITAL | Age: 43
Setting detail: THERAPIES SERIES
Discharge: HOME | End: 2023-04-24
Payer: COMMERCIAL

## 2023-04-24 DIAGNOSIS — M54.2 NECK PAIN: Primary | ICD-10-CM

## 2023-04-24 DIAGNOSIS — M62.838 MUSCLE SPASMS OF NECK: ICD-10-CM

## 2023-04-24 PROCEDURE — 97162 PT EVAL MOD COMPLEX 30 MIN: CPT

## 2023-04-24 NOTE — THERAPY EVALUATION
Outpatient Physical Therapy Ortho Initial Evaluation  [facility]     Patient Name: Gosia Brown  : 1980  MRN: [id number]  Today's Date: 2023      Visit Date: 2023     Attendance:  (awaiting authorization)  Subjective % improved: N/A  Recert Due Date: 5/15/23  MD appt: TBD    Therapy diagnosis: Cervical Radiculopathy s/p C4-7 fusion      Patient Active Problem List   Diagnosis   • Blood in stool   • Nausea   • Bloating   • Generalized abdominal pain   • Change in bowel habits   • Right knee pain   • Internal derangement of right knee   • Abnormal uterine bleeding   • Family history of breast cancer   • Family history of ovarian cancer   • Menorrhagia   • PAT (paroxysmal atrial tachycardia)   • Uterine fibroid   • Vasovagal syncope   • Chronic neck pain   • Morbidly obese   • Hypertension   • Epigastric pain   • Helicobacter pylori gastritis   • PTSD (post-traumatic stress disorder)   • Recurrent major depressive disorder, in partial remission   • TERESA (generalized anxiety disorder)        Past Medical History:   Diagnosis Date   • Anemia    • Anxiety    • Arthritis    • Back problem    • Bipolar depression    • Chronic pain disorder    • Depression    • Diabetes mellitus 2019    On a strict diet so i dont become one. Sugar is staying elev   • GERD (gastroesophageal reflux disease)    • Heart problem    • Heartburn    • Panic disorder    • Psychosis    • PTSD (post-traumatic stress disorder)         Past Surgical History:   Procedure Laterality Date   • COLONOSCOPY N/A 2018    Procedure: COLONOSCOPY;  Surgeon: Geoff Frank MD;  Location: [facility] ENDOSCOPY;  Service:    • COLONOSCOPY N/A 2020    Procedure: COLONOSCOPY;  Surgeon: Geoff Frank MD;  Location: [facility] ENDOSCOPY;  Service: Gastroenterology   • ENDOSCOPY N/A 2018    Procedure: ESOPHAGOGASTRODUODENOSCOPY;  Surgeon: Geoff Frank MD;  Location: [facility] ENDOSCOPY;  Service:    • ENDOSCOPY N/A  "01/28/2020    Procedure: ESOPHAGOGASTRODUODENOSCOPY;  Surgeon: Geoff Frank MD;  Location: [facility] ENDOSCOPY;  Service: Gastroenterology   • EYE SURGERY Right    • SPINAL FUSION  09/27/2022   • TUBAL ABDOMINAL LIGATION         Visit Dx:     ICD-10-CM ICD-9-CM   1. Neck pain  M54.2 723.1   2. Muscle spasms of neck  M62.838 728.85          Patient History     Row Name 04/24/23 0800             History    Chief Complaint Pain;Headache;Numbness;Tinglings;Difficulty with daily activities  -ND      Type of Pain Neck pain  -ND      Date Current Problem(s) Began --  Chronicity of 25-30 years  -ND      Brief Description of Current Complaint Pt presents to PT with c/o worsening neck pain with both UE and LE radicular symptoms s/p C4-7 fusion in October, 2022. Pt reports 25-30 year history of chronic neck pain d/t severe spinal stenosis, DDD, and osteophytes. She states that her pain is worse now than it was prior to surgery. She was pain free for the initial two months following surgery but now her symptoms have switched from her right side to the left side with numbness & tingling in her LUE to the pinky & ring finger. Her left knee will occasionally go out causing her to fall. She also reports onset of urinary incontinence following surgery. She has daily headaches that refer to her right eye. She uses heat every night to try and help her go to sleep. She takes Gabapentin, Baclofin, and Percocet 3x/day. She had 2-3 rounds of injections prior to surgery with no relief in symptoms. She participated in PT at this clinic in October of 2020, but only came to 2 or 3 visits stating, \"y'all about killed me\". She states that the manual cervical traction at the time made her symptoms worse. Her surgeon wants her to try PT again, stating that if this doesn't work then the fusion probably didn't take.  -ND      Previous treatment for THIS PROBLEM Surgery;Medication;Rehabilitation;Pain Management;Injections  -ND      Surgery Date: -- " " October, 2022  -ND      Patient/Caregiver Goals Relieve pain;Return to prior level of function;Return to work  -ND      Current Tobacco Use No  -ND      Smoking Status Never  -ND      Patient's Rating of General Health Good  -ND      Hand Dominance right-handed  -ND      Occupation/sports/leisure activities Occupation: Has not been released to return to work. 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, . 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  -ND      How has patient tried to help current problem? Heat, medication, rest, activity modification  -ND      What clinical tests have you had for this problem? CT scan  -ND      Results of Clinical Tests --  Pt states that her surgeon has not revealed the results of recent imaging.  -ND      Surgery/Hospitalization C4-7 fusion  -ND         Pain     Pain Location Neck  -ND      Pain at Present 7  -ND      Pain at Best --  3-4  -ND      Pain at Worst 10  \"20\"  -ND      Pain Frequency Constant/continuous  -ND      Is your sleep disturbed? Yes  -ND      Is medication used to assist with sleep? Yes  -ND         Fall Risk Assessment    Any falls in the past year: Yes  -ND      Number of falls reported in the last 12 months \"a few\"  -ND      Factors that contributed to the fall: Other (comment)  Left leg will give out on her  -ND         Daily Activities    Primary Language English  -ND         Safety    Are you being hurt, hit, or frightened by anyone at home or in your life? No  -ND      Have you had any of the following issues with Depression;Anxiety  -ND            User Key  (r) = Recorded By, (t) = Taken By, (c) = Cosigned By    Initials Name Provider Type    ND Damián Olivera, PT Physical Therapist                 PT Ortho     Row Name 04/24/23 1000       Sensory Screen for Light Touch- Upper Quarter Clearing    C4 (posterior shoulder) --  -ND    C5 (lateral upper arm) --  -ND    C6 (tip of thumb) --  -ND    C7 (tip of 3rd finger) --  -ND    C8 (tip of " 5th finger) --  -ND    T1 (medial lower arm) --  -ND       Myotomal Screen- Upper Quarter Clearing    Shoulder flexion (C5) --  -ND    Elbow flexion/wrist extension (C6) --  -ND    Elbow extension/wrist flexion (C7) --  -ND    Finger flexion/ (C8) --  -ND    Finger abduction (T1) --  -ND    Row Name 04/24/23 0800       Subjective Comments    Subjective Comments See Patient History  -ND       Precautions and Contraindications    Precautions C4-7 fusion 10/2022. No traction.   -ND       Subjective Pain    Able to rate subjective pain? yes  -ND    Pre-Treatment Pain Level 7  -ND       Posture/Observations    Posture/Observations Comments Decreased cervical lordosis. Bilateral scapular elevation with rounded shoulder and head forward posture. Increased tone and trigger points of right upper trap and suboccipitals as compared to left.  -ND       Sensory Screen for Light Touch- Upper Quarter Clearing    C4 (posterior shoulder) Bilateral:;Intact  -ND    C5 (lateral upper arm) Bilateral:;Intact  -ND    C6 (tip of thumb) Bilateral:;Intact  -ND    C7 (tip of 3rd finger) Bilateral:;Intact  -ND    C8 (tip of 5th finger) Bilateral:;Intact  -ND    T1 (medial lower arm) Bilateral:;Intact  -ND       Myotomal Screen- Upper Quarter Clearing    Shoulder flexion (C5) Bilateral:;WNL  -ND    Elbow flexion/wrist extension (C6) Bilateral:;WNL  -ND    Elbow extension/wrist flexion (C7) Right:;WNL;Left:;4- (Good -)  -ND    Finger flexion/ (C8) Right:;WNL;Left:;4- (Good -)  -ND    Finger abduction (T1) Right:;WNL;Left:;4- (Good -)  -ND       Head/Neck/Trunk    Neck Extension AROM 25%  -ND    Neck Flexion AROM 25%  -ND    Neck Lt Lateral Flexion AROM 25%  -ND    Neck Rt Lateral Flexion AROM 25%  -ND    Neck Lt Rotation AROM 50%  -ND    Neck Rt Rotation AROM 25%  -ND    Head/Neck/Trunk Comments Pain in all planes of motion. Worse with extension and right rotation.  -ND       MMT Right Upper Ext    Rt Shoulder Flexion MMT, Gross Movement  (4+/5) good plus  -ND    Rt Shoulder ABduction MMT, Gross Movement (4+/5) good plus  -ND    Rt Shoulder Internal Rotation MMT, Gross Movement (4+/5) good plus  -ND    Rt Shoulder External Rotation MMT, Gross Movement (4+/5) good plus  -ND    Rt Elbow Flexion MMT, Gross Movement: (4+/5) good plus  -ND    Rt Elbow Extension MMT, Gross Movement: (4+/5) good plus  -ND    Rt Wrist Flexion MMT, Gross Movement (4/5) good  -ND    Rt Wrist Extension MMT, Gross Movement (4/5) good  -ND       MMT Left Upper Ext    Lt Shoulder Flexion MMT, Gross Movement (4+/5) good plus  -ND    Lt Shoulder ABduction MMT, Gross Movement (4+/5) good plus  -ND    Lt Shoulder Internal Rotation MMT, Gross Movement (4+/5) good plus  -ND    Lt Shoulder External Rotation MMT, Gross Movement (4+/5) good plus  -ND    Lt Elbow Flexion MMT, Gross Movement (4+/5) good plus  -ND    Lt Elbow Extension MMT, Gross Movement (4-/5) good minus  -ND    Lt Wrist Flexion MMT, Gross Movement (4/5) good  -ND    Lt Wrist Extension MMT, Gross Movement (4/5) good  -ND       Sensation    Sensation WNL? WNL  -ND    Light Touch No apparent deficits  -ND          User Key  (r) = Recorded By, (t) = Taken By, (c) = Cosigned By    Initials Name Provider Type    Damián Vela, PT Physical Therapist                            Therapy Education  Education Details: Involved anatomy, pathology, diagnosis, prognosis, POC, TDN  Given: Symptoms/condition management, Pain management, Posture/body mechanics  Program: New  How Provided: Verbal  Provided to: Patient  Level of Understanding: Verbalized      PT OP Goals     Row Name 04/24/23 0800          PT Short Term Goals    STG Date to Achieve 05/15/23  -ND     STG 1 Pt to demonstrate I with HEP for postural strengthening and cervical stabilization  -ND     STG 2 Pt to report decreased frequency and intensity of headaches that refer to her right eye by 50%.  -ND     STG 3 Pt to have decreased TTP of posterior cervicothoracic muscles to  slight levels.  -ND     STG 4 Perform NDI  -ND        Long Term Goals    LTG Date to Achieve --  LTG deferred  -ND        Time Calculation    PT Goal Re-Cert Due Date 05/15/23  -ND           User Key  (r) = Recorded By, (t) = Taken By, (c) = Cosigned By    Initials Name Provider Type    ND Damián Olivera, PT Physical Therapist                 PT Assessment/Plan     Row Name 04/24/23 0800          PT Assessment    Functional Limitations Limitation in home management;Limitations in community activities;Limitations in functional capacity and performance;Performance in leisure activities;Performance in self-care ADL;Performance in work activities  -ND     Impairments Pain;Posture;Range of motion;Sensation;Poor body mechanics;Muscle strength;Motor function;Impaired muscle endurance  -ND     Assessment Comments Pt is a 42 y.o. female that presents to PT with c/o worsening neck pain and both UE & LE radicular symptoms following C4-7 fusion in October 2022. Pt presents with abnormal posture with elevated and protracted scapulas and decreased cervical lordosis. She demonstrates significant limitations in cervical AROM in all planes with the greatest limitations in extension and right rotation. Pt noted to have increased hypertonicity and trigger points of right upper trapezius and suboccipitals with replication of familiar headaches. Pt was educated in the benefits of TDN to address these symptoms. Pt was eval only today. She would benefit from skilled PT intervention to address myofascial restrictions and improve posture to decrease neck pain, radicular symptoms, and headaches for improved tolerance with functional acitivity.  -ND     Rehab Potential Fair  Barriers: chronicity, cervical fusion, biopsychococial factors  -ND     Patient/caregiver participated in establishment of treatment plan and goals Yes  -ND     Patient would benefit from skilled therapy intervention Yes  -ND        PT Plan    PT Frequency 2x/week  -ND      Predicted Duration of Therapy Intervention (PT) 4 weeks with more TBD  -ND     Planned CPT's? PT EVAL MOD COMPLELITY: 28851;PT RE-EVAL: 28435;PT THER PROC EA 15 MIN: 27358;PT THER ACT EA 15 MIN: 99992;PT MANUAL THERAPY EA 15 MIN: 58574;PT NEUROMUSC RE-EDUCATION EA 15 MIN: 34405;PT AQUATIC THERAPY EA 15 MIN: 58463;PT SELF CARE/HOME MGMT/TRAIN EA 15: 08027;PT HOT OR COLD PACK TREAT MCARE;PT ELECTRICAL STIM UNATTEND: ;PT ULTRASOUND EA 15 MIN: 89557;PT THER SUPP EA 15 MIN  -ND     PT Plan Comments TDN to suboccipitals and upper traps. Superficial soft tissue as tolerated. Progress with cervical stability, thoracic mobility,and periscapular strengthening TherEx as tolerated. Modalities prn for pain control.  -ND           User Key  (r) = Recorded By, (t) = Taken By, (c) = Cosigned By    Initials Name Provider Type    Damián Vela, KAMILLE Physical Therapist                   OP Exercises     Row Name 04/24/23 0800             Subjective Comments    Subjective Comments See Patient History  -ND         Subjective Pain    Able to rate subjective pain? yes  -ND      Pre-Treatment Pain Level 7  -ND         Exercise 1    Exercise Name 1 Eval/POC  -ND            User Key  (r) = Recorded By, (t) = Taken By, (c) = Cosigned By    Initials Name Provider Type    Damián Vela, PT Physical Therapist                                        Time Calculation:     Start Time: 0800  Stop Time: 0839  Time Calculation (min): 39 min  Untimed Charges  PT Eval/Re-eval Minutes: 39  Total Minutes  Untimed Charges Total Minutes: 39   Total Minutes: 39     Therapy Charges for Today     Code Description Service Date Service Provider Modifiers Qty    79591861456  PT EVAL MOD COMPLEXITY 3 4/24/2023 Damián Olivera, PT GP 1                    Damián Olivera PT  4/24/2023

## 2023-05-03 ENCOUNTER — HOSPITAL ENCOUNTER (OUTPATIENT)
Dept: PHYSICAL THERAPY | Facility: HOSPITAL | Age: 43
Setting detail: THERAPIES SERIES
Discharge: HOME | End: 2023-05-03
Payer: COMMERCIAL

## 2023-05-03 DIAGNOSIS — M62.838 MUSCLE SPASMS OF NECK: ICD-10-CM

## 2023-05-03 DIAGNOSIS — M54.2 NECK PAIN: Primary | ICD-10-CM

## 2023-05-03 PROCEDURE — 97110 THERAPEUTIC EXERCISES: CPT

## 2023-05-03 PROCEDURE — 97140 MANUAL THERAPY 1/> REGIONS: CPT

## 2023-05-03 NOTE — THERAPY TREATMENT NOTE
Outpatient Physical Therapy Ortho Treatment Note  [facility]     Patient Name: Gosia Brown  : 1980  MRN: [id number]  Today's Date: 5/3/2023      Visit Date: 2023     Attendance:  (awaiting authorization)  Subjective % improved: N/A  Recert Due Date: 5/15/23  MD appt: TBD     Therapy diagnosis: Cervical Radiculopathy s/p C4-7 fusion    Visit Dx:    ICD-10-CM ICD-9-CM   1. Neck pain  M54.2 723.1   2. Muscle spasms of neck  M62.838 728.85       Patient Active Problem List   Diagnosis   • Blood in stool   • Nausea   • Bloating   • Generalized abdominal pain   • Change in bowel habits   • Right knee pain   • Internal derangement of right knee   • Abnormal uterine bleeding   • Family history of breast cancer   • Family history of ovarian cancer   • Menorrhagia   • PAT (paroxysmal atrial tachycardia)   • Uterine fibroid   • Vasovagal syncope   • Chronic neck pain   • Morbidly obese   • Hypertension   • Epigastric pain   • Helicobacter pylori gastritis   • PTSD (post-traumatic stress disorder)   • Recurrent major depressive disorder, in partial remission   • TERESA (generalized anxiety disorder)        Past Medical History:   Diagnosis Date   • Anemia    • Anxiety    • Arthritis    • Back problem    • Bipolar depression    • Chronic pain disorder    • Depression    • Diabetes mellitus 2019    On a strict diet so i dont become one. Sugar is staying elev   • GERD (gastroesophageal reflux disease)    • Heart problem    • Heartburn    • Panic disorder    • Psychosis    • PTSD (post-traumatic stress disorder)         Past Surgical History:   Procedure Laterality Date   • COLONOSCOPY N/A 2018    Procedure: COLONOSCOPY;  Surgeon: Geoff Frank MD;  Location: [facility] ENDOSCOPY;  Service:    • COLONOSCOPY N/A 2020    Procedure: COLONOSCOPY;  Surgeon: Geoff Frank MD;  Location: [facility] ENDOSCOPY;  Service: Gastroenterology   • ENDOSCOPY N/A 2018    Procedure:  "ESOPHAGOGASTRODUODENOSCOPY;  Surgeon: Geoff Frank MD;  Location: [facility] ENDOSCOPY;  Service:    • ENDOSCOPY N/A 01/28/2020    Procedure: ESOPHAGOGASTRODUODENOSCOPY;  Surgeon: Geoff Frank MD;  Location: [facility] ENDOSCOPY;  Service: Gastroenterology   • EYE SURGERY Right    • SPINAL FUSION  09/27/2022   • TUBAL ABDOMINAL LIGATION          PT Ortho     Row Name 05/03/23 0800       Subjective Comments    Subjective Comments Pt reports that she has been in 10/10 pain over the past 2 weeks. She states that the pain was so intense on Sunday morning that after getting up to shower and get ready for [employer] she passed out. She has not informed her MD of this stating that \"there's nothing they can do until next month anyway\".  -ND       Precautions and Contraindications    Precautions C4-7 fusion 10/2022. No traction.   -ND       Subjective Pain    Able to rate subjective pain? yes  -ND    Pre-Treatment Pain Level 10  -ND    Post-Treatment Pain Level 8  -ND       Posture/Observations    Posture/Observations Comments Moderate rounded shoulders with head forward posture.  -ND          User Key  (r) = Recorded By, (t) = Taken By, (c) = Cosigned By    Initials Name Provider Type    ND Damián Olivera, PT Physical Therapist                             PT Assessment/Plan     Row Name 05/03/23 0800          PT Assessment    Functional Limitations Limitation in home management;Limitations in community activities;Limitations in functional capacity and performance;Performance in leisure activities;Performance in self-care ADL;Performance in work activities  -ND     Impairments Pain;Posture;Range of motion;Sensation;Poor body mechanics;Muscle strength;Motor function;Impaired muscle endurance  -ND     Assessment Comments Pt with fair response to TDN of bilateral upper traps with good twitch response noted on left side. Pt also had several twitches with TDN of right suboccipitals. Pt was progressed with HEP to consist of supine chin " "tucks, cervical 4-way isometrics, and TB resisted rows/extensions all of which she was able to perform with god tolerance.  -ND     Rehab Potential Fair  Barriers: chronicity, cervical fusion, biopsychosocial factors  -ND     Patient/caregiver participated in establishment of treatment plan and goals Yes  -ND     Patient would benefit from skilled therapy intervention Yes  -ND        PT Plan    PT Frequency 2x/week  -ND     Predicted Duration of Therapy Intervention (PT) 4 weeks with more TBD  -ND     PT Plan Comments Monitor response to TDN. Progress with cervical stabilization and periscapular strengthening as tolerated.  -ND           User Key  (r) = Recorded By, (t) = Taken By, (c) = Cosigned By    Initials Name Provider Type    Damián Vela, PT Physical Therapist                   OP Exercises     Row Name 05/03/23 0800             Subjective Comments    Subjective Comments Pt reports that she has been in 10/10 pain over the past 2 weeks. She states that the pain was so intense on Sunday morning that after getting up to shower and get ready for [employer] she passed out. She has not informed her MD of this stating that \"there's nothing they can do until next month anyway\".  -ND         Subjective Pain    Able to rate subjective pain? yes  -ND      Pre-Treatment Pain Level 10  -ND      Post-Treatment Pain Level 8  -ND         Total Minutes    68967 - PT Therapeutic Exercise Minutes 17  -ND      60936 - PT Manual Therapy Minutes 25  -ND         Exercise 1    Exercise Name 1 See Manual  -ND      Time 1 25'  -ND         Exercise 2    Exercise Name 2 Supine chin tucks  -ND      Cueing 2 Verbal;Tactile  -ND      Sets 2 1  -ND      Reps 2 10  -ND      Time 2 5\" hold  -ND         Exercise 3    Exercise Name 3 Cervical 4-way isometrics  -ND      Cueing 3 Verbal;Demo  -ND      Sets 3 1  -ND      Reps 3 10  -ND      Time 3 5\" hold  -ND         Exercise 4    Exercise Name 4 TB resisted rows  -ND      Cueing 4 Verbal;Demo  " -ND      Sets 4 2  -ND      Reps 4 10  -ND      Additional Comments GTB  -ND         Exercise 5    Exercise Name 5 TB resisted extensions  -ND      Cueing 5 Verbal;Demo  -ND      Sets 5 2  -ND      Reps 5 10  -ND      Additional Comments GTB  -ND            User Key  (r) = Recorded By, (t) = Taken By, (c) = Cosigned By    Initials Name Provider Type    Damián Vela, PT Physical Therapist                         Manual Rx (last 36 hours)     Manual Treatments     Row Name 05/03/23 0800             Total Minutes    73481 - PT Manual Therapy Minutes 25  -ND         Manual Rx 1    Manual Rx 1 Location Bilateral UT & suboccipitals  -ND      Manual Rx 1 Type TDN/STM/MFR/SOR  -ND      Manual Rx 1 Duration 25'  -ND            User Key  (r) = Recorded By, (t) = Taken By, (c) = Cosigned By    Initials Name Provider Type    Damián Vela, PT Physical Therapist                 PT OP Goals     Row Name 05/03/23 0800          PT Short Term Goals    STG Date to Achieve 05/15/23  -ND     STG 1 Pt to demonstrate I with HEP for postural strengthening and cervical stabilization  -ND     STG 2 Pt to report decreased frequency and intensity of headaches that refer to her right eye by 50%.  -ND     STG 3 Pt to have decreased TTP of posterior cervicothoracic muscles to slight levels.  -ND     STG 4 Perform NDI  -ND        Long Term Goals    LTG Date to Achieve --  LTG deferred  -ND        Time Calculation    PT Goal Re-Cert Due Date 05/15/23  -ND           User Key  (r) = Recorded By, (t) = Taken By, (c) = Cosigned By    Initials Name Provider Type    Damián Vela, PT Physical Therapist                               Time Calculation:   Start Time: 0800  Stop Time: 0842  Time Calculation (min): 42 min  Total Timed Code Minutes- PT: 42 minute(s)  Timed Charges  69439 - PT Therapeutic Exercise Minutes: 17  95618 - PT Manual Therapy Minutes: 25  Total Minutes  Timed Charges Total Minutes: 42   Total Minutes: 42  Therapy Charges for  Today     Code Description Service Date Service Provider Modifiers Qty    74167682410 HC PT THER PROC EA 15 MIN 5/3/2023 Damián Olivera, PT GP 1    13849388121 HC PT MANUAL THERAPY EA 15 MIN 5/3/2023 Damián Olivera, PT GP 2                    Damián Olivera, PT  5/3/2023

## 2023-05-10 ENCOUNTER — APPOINTMENT (OUTPATIENT)
Dept: PHYSICAL THERAPY | Facility: HOSPITAL | Age: 43
End: 2023-05-10
Payer: COMMERCIAL

## 2023-05-17 ENCOUNTER — APPOINTMENT (OUTPATIENT)
Dept: PHYSICAL THERAPY | Facility: HOSPITAL | Age: 43
End: 2023-05-17
Payer: COMMERCIAL

## 2023-05-22 ENCOUNTER — TELEMEDICINE (OUTPATIENT)
Dept: PSYCHIATRY | Facility: CLINIC | Age: 43
End: 2023-05-22
Payer: COMMERCIAL

## 2023-05-22 DIAGNOSIS — F41.1 GAD (GENERALIZED ANXIETY DISORDER): ICD-10-CM

## 2023-05-22 DIAGNOSIS — F31.30 BIPOLAR I DISORDER, MOST RECENT EPISODE DEPRESSED: ICD-10-CM

## 2023-05-22 RX ORDER — BUPROPION HYDROCHLORIDE 300 MG/1
300 TABLET ORAL EVERY MORNING
Qty: 30 TABLET | Refills: 2 | Status: SHIPPED | OUTPATIENT
Start: 2023-05-22

## 2023-05-22 RX ORDER — BUSPIRONE HYDROCHLORIDE 10 MG/1
10 TABLET ORAL 3 TIMES DAILY
Qty: 90 TABLET | Refills: 2 | Status: SHIPPED | OUTPATIENT
Start: 2023-05-22

## 2023-05-22 RX ORDER — OXCARBAZEPINE 600 MG/1
TABLET, FILM COATED ORAL
Qty: 60 TABLET | Refills: 2 | Status: SHIPPED | OUTPATIENT
Start: 2023-05-22
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
## 2023-05-24 ENCOUNTER — HOSPITAL ENCOUNTER (OUTPATIENT)
Dept: PHYSICAL THERAPY | Facility: HOSPITAL | Age: 43
Setting detail: THERAPIES SERIES
Discharge: HOME | End: 2023-05-24
Payer: COMMERCIAL

## 2023-05-24 DIAGNOSIS — M54.2 NECK PAIN: Primary | ICD-10-CM

## 2023-05-24 DIAGNOSIS — M62.838 MUSCLE SPASMS OF NECK: ICD-10-CM

## 2023-05-24 PROCEDURE — 97140 MANUAL THERAPY 1/> REGIONS: CPT

## 2023-05-24 PROCEDURE — 97110 THERAPEUTIC EXERCISES: CPT

## 2023-05-24 NOTE — THERAPY PROGRESS REPORT/RE-CERT
Outpatient Physical Therapy Ortho Progress Note  [facility]     Patient Name: Gosia Brown  : 1980  MRN: [id number]  Today's Date: 2023      Visit Date: 2023     Attendance: 3/5 (awaiting authorization)  Subjective % improved: N/A  Recert Due Date: 5/15/23  MD appt: 23     Therapy diagnosis: Cervical Radiculopathy s/p C4-7 fusion    Visit Dx:    ICD-10-CM ICD-9-CM   1. Neck pain  M54.2 723.1   2. Muscle spasms of neck  M62.838 728.85       Patient Active Problem List   Diagnosis   • Blood in stool   • Nausea   • Bloating   • Generalized abdominal pain   • Change in bowel habits   • Right knee pain   • Internal derangement of right knee   • Abnormal uterine bleeding   • Family history of breast cancer   • Family history of ovarian cancer   • Menorrhagia   • PAT (paroxysmal atrial tachycardia)   • Uterine fibroid   • Vasovagal syncope   • Chronic neck pain   • Morbidly obese   • Hypertension   • Epigastric pain   • Helicobacter pylori gastritis   • PTSD (post-traumatic stress disorder)   • Recurrent major depressive disorder, in partial remission   • TERESA (generalized anxiety disorder)        Past Medical History:   Diagnosis Date   • Anemia    • Anxiety    • Arthritis    • Back problem    • Bipolar depression    • Chronic pain disorder    • Depression    • Diabetes mellitus 2019    On a strict diet so i dont become one. Sugar is staying elev   • GERD (gastroesophageal reflux disease)    • Heart problem    • Heartburn    • Panic disorder    • Psychosis    • PTSD (post-traumatic stress disorder)         Past Surgical History:   Procedure Laterality Date   • COLONOSCOPY N/A 2018    Procedure: COLONOSCOPY;  Surgeon: Geoff Frank MD;  Location: [facility] ENDOSCOPY;  Service:    • COLONOSCOPY N/A 2020    Procedure: COLONOSCOPY;  Surgeon: Geoff Frank MD;  Location: [facility] ENDOSCOPY;  Service: Gastroenterology   • ENDOSCOPY N/A 2018    Procedure:  ESOPHAGOGASTRODUODENOSCOPY;  Surgeon: Geoff Frank MD;  Location: [facility] ENDOSCOPY;  Service:    • ENDOSCOPY N/A 01/28/2020    Procedure: ESOPHAGOGASTRODUODENOSCOPY;  Surgeon: Geoff Frank MD;  Location: [facility] ENDOSCOPY;  Service: Gastroenterology   • EYE SURGERY Right    • SPINAL FUSION  09/27/2022   • TUBAL ABDOMINAL LIGATION          PT Ortho     Row Name 05/24/23 0847       Subjective Comments    Subjective Comments Pt reports that she has been passing out more recently. She states that this happens daily. She states that she just brushes this off and has not contacted her PCP or surgeon concerning this because she knows that there's nothing they will do about it. She knows that this is coming from her neck. When she looks down she gets dizzy and lightheaded. She has to support her chin with her hands a lot of the time because her neck doesn't feel strong enough to support her head. She reports that the dry needling helped for a few days at her last therapy session three weeks ago but her familiar symptoms soon returned. She reports compliance with her HEP without difficulty. She reports that she has started to have intermittent numbness & tingling in the right side of her head & face and either the 2nd & 3rd or the 4th & 5th digits of her right hand. The toes of her right foot have started to go numb more frequently.  -ND       Precautions and Contraindications    Precautions C4-7 fusion 10/2022. No traction.   -ND       Subjective Pain    Able to rate subjective pain? yes  -ND    Pre-Treatment Pain Level --  Left side 8/10; Right side 2/10  -ND    Post-Treatment Pain Level 7  -ND       Posture/Observations    Posture/Observations Comments Moderate rounded and elevated shoulders with head forward posture. Moderate to sever trigger points in bilateral UT & LS  -ND       Sensory Screen for Light Touch- Upper Quarter Clearing    C4 (posterior shoulder) Right:;Diminished  -ND    C5 (lateral upper arm)  Bilateral:;Intact  -ND    C6 (tip of thumb) Right:;Diminished  -ND    C7 (tip of 3rd finger) Bilateral:;Intact  -ND    C8 (tip of 5th finger) Bilateral:;Intact  -ND    T1 (medial lower arm) Bilateral:;Intact  -ND       Myotomal Screen- Upper Quarter Clearing    Shoulder flexion (C5) Left:;5 (Normal);Right:;4 (Good)  -ND    Elbow flexion/wrist extension (C6) Left:;5 (Normal);Right:;4 (Good)  -ND    Elbow extension/wrist flexion (C7) Bilateral:;WNL  -ND    Finger flexion/ (C8) Left:;5 (Normal);Right:;4 (Good)  -ND    Finger abduction (T1) Left:;4+ (Good +);Right:;4 (Good)  -ND     Bilateral:;WNL  -ND       Head/Neck/Trunk    Neck Extension AROM 25%  -ND    Neck Flexion AROM 50%  -ND    Neck Lt Lateral Flexion AROM 25%  -ND    Neck Rt Lateral Flexion AROM 50%  -ND    Neck Lt Rotation AROM 50%  -ND    Neck Rt Rotation AROM 50%  -ND    Head/Neck/Trunk Comments Pain with extension and left lateral flexion  -ND       MMT Right Upper Ext    Rt Shoulder Flexion MMT, Gross Movement (4/5) good  -ND    Rt Shoulder ABduction MMT, Gross Movement (4+/5) good plus  -ND    Rt Shoulder Internal Rotation MMT, Gross Movement (4/5) good  -ND    Rt Shoulder External Rotation MMT, Gross Movement (4/5) good  -ND    Rt Elbow Flexion MMT, Gross Movement: (4/5) good  -ND    Rt Elbow Extension MMT, Gross Movement: (4+/5) good plus  -ND    Rt Wrist Flexion MMT, Gross Movement (4+/5) good plus  -ND    Rt Wrist Extension MMT, Gross Movement (4/5) good  -ND       MMT Left Upper Ext    Lt Shoulder Flexion MMT, Gross Movement (4+/5) good plus  -ND    Lt Shoulder ABduction MMT, Gross Movement (4+/5) good plus  -ND    Lt Shoulder Internal Rotation MMT, Gross Movement (4+/5) good plus  -ND    Lt Shoulder External Rotation MMT, Gross Movement (4+/5) good plus  -ND    Lt Elbow Flexion MMT, Gross Movement (4+/5) good plus  -ND    Lt Elbow Extension MMT, Gross Movement (4+/5) good plus  -ND    Lt Wrist Flexion MMT, Gross Movement (4+/5) good plus   -ND    Lt Wrist Extension MMT, Gross Movement (4+/5) good plus  -ND          User Key  (r) = Recorded By, (t) = Taken By, (c) = Cosigned By    Initials Name Provider Type    Damián Vela PT Physical Therapist                             PT Assessment/Plan     Row Name 05/24/23 0847          PT Assessment    Functional Limitations Limitation in home management;Limitations in community activities;Limitations in functional capacity and performance;Performance in leisure activities;Performance in self-care ADL;Performance in work activities  -ND     Impairments Pain;Posture;Range of motion;Sensation;Poor body mechanics;Muscle strength;Motor function;Impaired muscle endurance  -ND     Assessment Comments Pt has made marginal progress through 3 visits with PT. She has been unable to participate in PT over the past three weeks since 5/3/23 d/t an increase in her episodes of syncope. She is unable to elaborate on how long these episodes will last. She states that looking down for prolonged periods causes her to become dizzy and often times pass out. The patient does not appear to be concerned by this. She was encouraged to contact her surgeon or PCP concerning these syncopal episodes. She states that both doctor's would just tell her to wait until her 8 month f/u with the surgeon anyway. Pt continues to have moderate trigger points in bilateral upper traps with good response to TDN on this date. Pt demonstrates decreased strength and increased parasthesia of RUE as compared to her initial evaluation. Her NDI places her at 33% disability. Pt would benefit from continued skilled PT intervention to address myofascial restrictions in posterior cervical musculature and progress with postural strengthening and cervical stability to decrease pain and headaches and increase strength in RUE to facilitate improved tolerance with ADL's.  -ND     Rehab Potential Fair  Barriers: chronicity, cervical fusion, biopsychosocial  -ND      Patient/caregiver participated in establishment of treatment plan and goals Yes  -ND     Patient would benefit from skilled therapy intervention Yes  -ND        PT Plan    PT Frequency 2x/week  -ND     Predicted Duration of Therapy Intervention (PT) 4 weeks  -ND     PT Plan Comments Monitor response to TDN. Progress with cervical stabilization and periscapular strengthening as tolerated.  -ND           User Key  (r) = Recorded By, (t) = Taken By, (c) = Cosigned By    Initials Name Provider Type    ND Damián Olivera, PT Physical Therapist                   OP Exercises     Row Name 05/24/23 7858             Subjective Comments    Subjective Comments Pt reports that she has been passing out more recently. She states that this happens daily. She states that she just brushes this off and has not contacted her PCP or surgeon concerning this because she knows that there's nothing they will do about it. She knows that this is coming from her neck. When she looks down she gets dizzy and lightheaded. She has to support her chin with her hands a lot of the time because her neck doesn't feel strong enough to support her head. She reports that the dry needling helped for a few days at her last therapy session three weeks ago but her familiar symptoms soon returned. She reports compliance with her HEP without difficulty. She reports that she has started to have intermittent numbness & tingling in the right side of her head & face and either the 2nd & 3rd or the 4th & 5th digits of her right hand. The toes of her right foot have started to go numb more frequently.  -ND         Subjective Pain    Able to rate subjective pain? yes  -ND      Pre-Treatment Pain Level --  Left side 8/10; Right side 2/10  -ND      Post-Treatment Pain Level 7  -ND         Total Minutes    66753 - PT Therapeutic Exercise Minutes 36  -ND      42086 - PT Manual Therapy Minutes 12  -ND         Exercise 1    Exercise Name 1 Reassessment  -ND         Exercise 2  "   Exercise Name 2 See Manual  -ND      Time 2 12'  -ND         Exercise 3    Exercise Name 3 Pro II, Lvl 2.0  -ND      Cueing 3 Verbal  -ND      Time 3 8'  -ND      Additional Comments 4' fwd/4' retro  -ND            User Key  (r) = Recorded By, (t) = Taken By, (c) = Cosigned By    Initials Name Provider Type    Damián Vela, PT Physical Therapist                         Manual Rx (last 36 hours)     Manual Treatments     Row Name 05/24/23 0847             Total Minutes    03544 - PT Manual Therapy Minutes 12  -ND         Manual Rx 1    Manual Rx 1 Location Bilateral upper traps  -ND      Manual Rx 1 Type TDN/MFR  -ND      Manual Rx 1 Duration 12'  -ND            User Key  (r) = Recorded By, (t) = Taken By, (c) = Cosigned By    Initials Name Provider Type    ND Damián Olivera, PT Physical Therapist                 PT OP Goals     Row Name 05/24/23 0847          PT Short Term Goals    STG Date to Achieve 05/15/23  -ND     STG 1 Pt to demonstrate I with HEP for postural strengthening and cervical stabilization  -ND     STG 1 Progress Progressing;Ongoing  -ND     STG 2 Pt to report decreased frequency and intensity of headaches that refer to her right eye by 50%.  -ND     STG 2 Progress Progressing;Ongoing  -ND     STG 2 Progress Comments \"It's a little bit better\"  -ND     STG 3 Pt to have decreased TTP of posterior cervicothoracic muscles to slight levels.  -ND     STG 3 Progress Progressing;Ongoing  -ND     STG 4 Perform NDI  -ND     STG 4 Progress Met  -ND        Long Term Goals    LTG Date to Achieve 06/14/23  -ND     LTG 1 Pt to report decreased dizziness and epsodes of passing out by 50%  -ND     LTG 1 Progress New  -ND     LTG 2 Pt to tolerate performing 30 minutes of housework with only slight increaes in neck pain.  -ND     LTG 2 Progress New  -ND     LTG 3 NDI to improve by 10% to demonstrate improved tolerance with ADL's  -ND     LTG 3 Progress New  -ND        Time Calculation    PT Goal Re-Cert Due Date " 06/14/23  -ND           User Key  (r) = Recorded By, (t) = Taken By, (c) = Cosigned By    Initials Name Provider Type    Damián Vela PT Physical Therapist                     Outcome Measure Options: Neck Disability Index (NDI)  Neck Disability Index  Section 1 - Pain Intensity: The pain is very severe but comes and goes.  Section 2 - Personal Care: It is painful to look after myself, and I am slow and careful.  Section 3 - Lifting: I can lift very light weights.  Section 4 - Work: I cannot do my usual work.  Section 5 - Headaches: I have severe headaches that come frequently.  Section 6 - Concentration: I can concentrate fully when I want to with slight difficulty.  Section 7 - Sleeping: My sleep is greatly disturbed (3-5 hours sleepless).  Section 8 - Driving: I can hardly drive at all because of severe neck pain.  Section 9 - Reading: I cannot read as much as I want because of moderate neck pain.  Section 10 - Recreation: I can hardly do any recreational activities because of pain in my neck.  Neck Disability Index Score: 33      Time Calculation:   Start Time: 0847  Stop Time: 0935  Time Calculation (min): 48 min  Total Timed Code Minutes- PT: 48 minute(s)  Timed Charges  51326 - PT Therapeutic Exercise Minutes: 36  94314 - PT Manual Therapy Minutes: 12  Total Minutes  Timed Charges Total Minutes: 48   Total Minutes: 48  Therapy Charges for Today     Code Description Service Date Service Provider Modifiers Qty    40939712680 HC PT THER PROC EA 15 MIN 5/24/2023 Damián Olivera, PT GP 2    08587396318 HC PT MANUAL THERAPY EA 15 MIN 5/24/2023 Damián Olivera, PT GP 1          PT G-Codes  Outcome Measure Options: Neck Disability Index (NDI)  Neck Disability Index Score: 33         Damián Olivera PT  5/24/2023

## 2023-06-05 ENCOUNTER — HOSPITAL ENCOUNTER (OUTPATIENT)
Dept: PHYSICAL THERAPY | Facility: HOSPITAL | Age: 43
Setting detail: THERAPIES SERIES
Discharge: HOME | End: 2023-06-05
Payer: COMMERCIAL

## 2023-06-05 DIAGNOSIS — M62.838 MUSCLE SPASMS OF NECK: ICD-10-CM

## 2023-06-05 DIAGNOSIS — M54.2 NECK PAIN: Primary | ICD-10-CM

## 2023-06-05 PROCEDURE — G0283 ELEC STIM OTHER THAN WOUND: HCPCS

## 2023-06-05 PROCEDURE — 97140 MANUAL THERAPY 1/> REGIONS: CPT

## 2023-06-05 PROCEDURE — 97110 THERAPEUTIC EXERCISES: CPT

## 2023-06-05 NOTE — THERAPY TREATMENT NOTE
Outpatient Physical Therapy Ortho Treatment Note  [facility]     Patient Name: Gosia Brown  : 1980  MRN: [id number]  Today's Date: 2023      Visit Date: 2023    Attendance: 4/7  Subjective % improved: N/A  Recert Due Date: 5/15/23  MD appt: 23     Therapy diagnosis: Cervical Radiculopathy s/p C4-7 fusion    Visit Dx:    ICD-10-CM ICD-9-CM   1. Neck pain  M54.2 723.1   2. Muscle spasms of neck  M62.838 728.85       Patient Active Problem List   Diagnosis    Blood in stool    Nausea    Bloating    Generalized abdominal pain    Change in bowel habits    Right knee pain    Internal derangement of right knee    Abnormal uterine bleeding    Family history of breast cancer    Family history of ovarian cancer    Menorrhagia    PAT (paroxysmal atrial tachycardia)    Uterine fibroid    Vasovagal syncope    Chronic neck pain    Morbidly obese    Hypertension    Epigastric pain    Helicobacter pylori gastritis    PTSD (post-traumatic stress disorder)    Recurrent major depressive disorder, in partial remission    TERESA (generalized anxiety disorder)        Past Medical History:   Diagnosis Date    Anemia     Anxiety     Arthritis     Back problem     Bipolar depression     Chronic pain disorder 2003    Depression     Diabetes mellitus 2019    On a strict diet so i dont become one. Sugar is staying elev    GERD (gastroesophageal reflux disease)     Heart problem     Heartburn     Panic disorder     Psychosis     PTSD (post-traumatic stress disorder)         Past Surgical History:   Procedure Laterality Date    COLONOSCOPY N/A 2018    Procedure: COLONOSCOPY;  Surgeon: Geoff Frank MD;  Location: [facility] ENDOSCOPY;  Service:     COLONOSCOPY N/A 2020    Procedure: COLONOSCOPY;  Surgeon: Geoff Frank MD;  Location: [facility] ENDOSCOPY;  Service: Gastroenterology    ENDOSCOPY N/A 2018    Procedure: ESOPHAGOGASTRODUODENOSCOPY;  Surgeon: Geoff Frank MD;  Location: [facility]  "ENDOSCOPY;  Service:     ENDOSCOPY N/A 01/28/2020    Procedure: ESOPHAGOGASTRODUODENOSCOPY;  Surgeon: Geoff Frank MD;  Location: [facility] ENDOSCOPY;  Service: Gastroenterology    EYE SURGERY Right     SPINAL FUSION  09/27/2022    TUBAL ABDOMINAL LIGATION          PT Ortho       Row Name 06/05/23 0907       Subjective Comments    Subjective Comments Pt reports increased pain in the right side of her head following her last therapy session to the point that she was in bed for several days afterwards. She reports recent onset of her lips \"puckering\" on their own randomly.  -ND       Precautions and Contraindications    Precautions C4-7 fusion 10/2022. No traction.   -ND       Subjective Pain    Able to rate subjective pain? yes  -ND    Pre-Treatment Pain Level 6  -ND    Post-Treatment Pain Level 6  -ND       Posture/Observations    Posture/Observations Comments Moderate tone through right upper trap and SCM. Positive cervical distraction with decreased pain.  -ND      Row Name 06/05/23 0900       Precautions and Contraindications    Precautions C4-7 fusion 10/2022. No traction.  -TL       Subjective Pain    Able to rate subjective pain? yes  -TL    Pre-Treatment Pain Level 6  -TL              User Key  (r) = Recorded By, (t) = Taken By, (c) = Cosigned By      Initials Name Provider Type    TL Cyn Conteh PTA Physical Therapist Assistant    Damián Vela, PT Physical Therapist                                 PT Assessment/Plan       Row Name 06/05/23 0907 06/05/23 0900       PT Assessment    Functional Limitations Limitation in home management;Limitations in community activities;Limitations in functional capacity and performance;Performance in leisure activities;Performance in self-care ADL;Performance in work activities  -ND --    Impairments Pain;Posture;Range of motion;Sensation;Poor body mechanics;Muscle strength;Motor function;Impaired muscle endurance  -ND --    Assessment Comments Pt presents with c/o " increased neck pain and tingling in the right side of her head following her last therapy session. She is not sure if this was caused by the dry needling, initiating the arm bike, or if she was just due to hurt worse. Pt also reports new recent onset of orofacial muscle spasms. Pt reported increased N/T in her RUE with performance of MFR to her right upper trap. Pt did report decreased localized neck pain with manual cervical distraction. Pt with good tolerance for E-stim with MH to end therapy session for decreased neck pain.  -ND --    Rehab Potential Poor  -ND --    Patient/caregiver participated in establishment of treatment plan and goals Yes  -ND --    Patient would benefit from skilled therapy intervention Yes  -ND --       PT Plan    PT Frequency 2x/week  -ND 2x/week  -TL    Predicted Duration of Therapy Intervention (PT) 4 weeks  -ND 4 weeks  -TL    PT Plan Comments Continue with manual to address posterior cervical mm hypertonicity. Progress with cervical stability and periscapular strengthening as tolerated.  -ND --              User Key  (r) = Recorded By, (t) = Taken By, (c) = Cosigned By      Initials Name Provider Type    Cyn Davies PTA Physical Therapist Assistant    Damián Vela, PT Physical Therapist                     Modalities       Row Name 06/05/23 0907             Moist Heat    MH Applied Yes  -ND      Location Cervical Spine with E-stim  -ND      PT Moist Heat Minutes 10  -ND       S/P Rx Yes  -ND         ELECTRICAL STIMULATION    Attended/Unattended Unattended  -ND      Stimulation Type IFC  -ND      Location/Electrode Placement/Other Cervical Spine w/ MH  -ND      PT E-Stim Unattended Minutes 10  -ND                User Key  (r) = Recorded By, (t) = Taken By, (c) = Cosigned By      Initials Name Provider Type    Damián Vela, PT Physical Therapist                   OP Exercises       Row Name 06/05/23 0907 06/05/23 0900          Subjective Comments    Subjective Comments  "Pt reports increased pain in the right side of her head following her last therapy session to the point that she was in bed for several days afterwards. She reports recent onset of her lips \"puckering\" on their own randomly.  -ND Pt reports that she stayed in bed all weekend. Pt reports that she was in alot of pain .  -TL        Subjective Pain    Able to rate subjective pain? yes  -ND yes  -TL     Pre-Treatment Pain Level 6  -ND 6  -TL     Post-Treatment Pain Level 6  -ND --        Total Minutes    39011 - PT Manual Therapy Minutes 28  -ND --        Exercise 1    Exercise Name 1 -- pro ll fwd/back UE  -TL     Time 1 -- 5 fwd/5 back  -TL     Additional Comments -- level 2  -TL               User Key  (r) = Recorded By, (t) = Taken By, (c) = Cosigned By      Initials Name Provider Type    TL Cyn Conteh, PTA Physical Therapist Assistant    Damián Vela, PT Physical Therapist                             Manual Rx (last 36 hours)       Manual Treatments       Row Name 06/05/23 0907             Total Minutes    62530 - PT Manual Therapy Minutes 28  -ND         Manual Rx 1    Manual Rx 1 Location Bilateral upper traps and Right SCM  -ND      Manual Rx 1 Type MFR  -ND         Manual Rx 2    Manual Rx 2 Location Cervical spine  -ND      Manual Rx 2 Type Manual distraction and SOR  -ND                User Key  (r) = Recorded By, (t) = Taken By, (c) = Cosigned By      Initials Name Provider Type    ND Damián Olivera, PT Physical Therapist                     PT OP Goals       Row Name 06/05/23 0907          PT Short Term Goals    STG Date to Achieve 05/15/23  -ND     STG 1 Pt to demonstrate I with HEP for postural strengthening and cervical stabilization  -ND     STG 1 Progress Progressing;Ongoing  -ND     STG 2 Pt to report decreased frequency and intensity of headaches that refer to her right eye by 50%.  -ND     STG 2 Progress Progressing;Ongoing  -ND     STG 3 Pt to have decreased TTP of posterior cervicothoracic " muscles to slight levels.  -ND     STG 3 Progress Progressing;Ongoing  -ND     STG 4 Perform NDI  -ND     STG 4 Progress Met  -ND        Long Term Goals    LTG Date to Achieve 06/14/23  -ND     LTG 1 Pt to report decreased dizziness and epsodes of passing out by 50%  -ND     LTG 1 Progress New  -ND     LTG 2 Pt to tolerate performing 30 minutes of housework with only slight increaes in neck pain.  -ND     LTG 2 Progress New  -ND     LTG 3 NDI to improve by 10% to demonstrate improved tolerance with ADL's  -ND     LTG 3 Progress New  -ND        Time Calculation    PT Goal Re-Cert Due Date 06/14/23  -ND               User Key  (r) = Recorded By, (t) = Taken By, (c) = Cosigned By      Initials Name Provider Type    ND Damián Olivera, PT Physical Therapist                                   Time Calculation:   Start Time: 0907  Stop Time: 0945  Time Calculation (min): 38 min  Total Timed Code Minutes- PT: 28 minute(s)  Timed Charges  29198 - PT Manual Therapy Minutes: 28  Untimed Charges  PT E-Stim Unattended Minutes: 10  PT Moist Heat Minutes: 10  Total Minutes  Timed Charges Total Minutes: 28  Untimed Charges Total Minutes: 20   Total Minutes: 48  Therapy Charges for Today       Code Description Service Date Service Provider Modifiers Qty    85592808758 HC PT MANUAL THERAPY EA 15 MIN 6/5/2023 Damián Olivera, PT GP 2    15918916263 HC PT ELECTRICAL STIM UNATTENDED 6/5/2023 Damián Olivera, PT  1                      Damián Olivera PT  6/5/2023

## 2023-06-09 ENCOUNTER — HOSPITAL ENCOUNTER (OUTPATIENT)
Dept: PHYSICAL THERAPY | Facility: HOSPITAL | Age: 43
Setting detail: THERAPIES SERIES
Discharge: HOME | End: 2023-06-09
Payer: COMMERCIAL

## 2023-06-09 DIAGNOSIS — M62.838 MUSCLE SPASMS OF NECK: ICD-10-CM

## 2023-06-09 DIAGNOSIS — M54.2 NECK PAIN: Primary | ICD-10-CM

## 2023-06-09 PROCEDURE — 97140 MANUAL THERAPY 1/> REGIONS: CPT

## 2023-06-09 PROCEDURE — 97110 THERAPEUTIC EXERCISES: CPT

## 2023-06-09 NOTE — THERAPY TREATMENT NOTE
Outpatient Physical Therapy Ortho Treatment Note  [facility]     Patient Name: Gosia Brown  : 1980  MRN: [id number]  Today's Date: 2023      Visit Date: 2023    Attendance: 5/8  Subjective % improved: N/A  Recert Due Date: 5/15/23  MD appt: 23     Therapy diagnosis: Cervical Radiculopathy s/p C4-7 fusion    Visit Dx:    ICD-10-CM ICD-9-CM   1. Neck pain  M54.2 723.1   2. Muscle spasms of neck  M62.838 728.85       Patient Active Problem List   Diagnosis    Blood in stool    Nausea    Bloating    Generalized abdominal pain    Change in bowel habits    Right knee pain    Internal derangement of right knee    Abnormal uterine bleeding    Family history of breast cancer    Family history of ovarian cancer    Menorrhagia    PAT (paroxysmal atrial tachycardia)    Uterine fibroid    Vasovagal syncope    Chronic neck pain    Morbidly obese    Hypertension    Epigastric pain    Helicobacter pylori gastritis    PTSD (post-traumatic stress disorder)    Recurrent major depressive disorder, in partial remission    TERESA (generalized anxiety disorder)        Past Medical History:   Diagnosis Date    Anemia     Anxiety     Arthritis     Back problem     Bipolar depression     Chronic pain disorder 2003    Depression     Diabetes mellitus 2019    On a strict diet so i dont become one. Sugar is staying elev    GERD (gastroesophageal reflux disease)     Heart problem     Heartburn     Panic disorder     Psychosis     PTSD (post-traumatic stress disorder)         Past Surgical History:   Procedure Laterality Date    COLONOSCOPY N/A 2018    Procedure: COLONOSCOPY;  Surgeon: Geoff Frank MD;  Location: [facility] ENDOSCOPY;  Service:     COLONOSCOPY N/A 2020    Procedure: COLONOSCOPY;  Surgeon: Geoff Frank MD;  Location: [facility] ENDOSCOPY;  Service: Gastroenterology    ENDOSCOPY N/A 2018    Procedure: ESOPHAGOGASTRODUODENOSCOPY;  Surgeon: Geoff Frank MD;  Location: [facility]  ENDOSCOPY;  Service:     ENDOSCOPY N/A 01/28/2020    Procedure: ESOPHAGOGASTRODUODENOSCOPY;  Surgeon: Geoff Frank MD;  Location: [facility] ENDOSCOPY;  Service: Gastroenterology    EYE SURGERY Right     SPINAL FUSION  09/27/2022    TUBAL ABDOMINAL LIGATION          PT Ortho       Row Name 06/09/23 0800       Subjective Comments    Subjective Comments Pt reports that the left side of her neck is some better but the right side is about the same. She reports having a headache following her last therapy session. She started to have increased tightness in the right side of her neck yesterday and can tell that a bad headache is about to come on. She went to her cardiologist yesterday and was given a good report.  -ND       Precautions and Contraindications    Precautions C4-7 fusion 10/2022. No traction.   -ND       Subjective Pain    Able to rate subjective pain? yes  -ND    Pre-Treatment Pain Level 3  -ND    Post-Treatment Pain Level 3  -ND              User Key  (r) = Recorded By, (t) = Taken By, (c) = Cosigned By      Initials Name Provider Type    ND Damián Olivera PT Physical Therapist                                 PT Assessment/Plan       Row Name 06/09/23 0845          PT Assessment    Functional Limitations Limitation in home management;Limitations in community activities;Limitations in functional capacity and performance;Performance in leisure activities;Performance in self-care ADL;Performance in work activities  -ND     Impairments Pain;Posture;Range of motion;Sensation;Poor body mechanics;Muscle strength;Motor function;Impaired muscle endurance  -ND     Assessment Comments Pt presented with increased right side neck pain and headache. Began session with  for decreased muscle tension. Pt with increased tone and tenderness through right SCM. Pt was progressed with cervical retraction and rotations in combination with scapular retraction for postural re-education. Pt had no change in headache following  "therapy session.  -ND     Rehab Potential Poor  -ND     Patient/caregiver participated in establishment of treatment plan and goals Yes  -ND     Patient would benefit from skilled therapy intervention Yes  -ND        PT Plan    PT Frequency 2x/week  -ND     Predicted Duration of Therapy Intervention (PT) 4 weeks  -ND     PT Plan Comments 2 authorized visits remaining. Will D/C at that time if no improvement.  -ND               User Key  (r) = Recorded By, (t) = Taken By, (c) = Cosigned By      Initials Name Provider Type    Damián Vela, PT Physical Therapist                       OP Exercises       Row Name 06/09/23 0845 06/09/23 0800          Subjective Comments    Subjective Comments -- Pt reports that the left side of her neck is some better but the right side is about the same. She reports having a headache following her last therapy session. She started to have increased tightness in the right side of her neck yesterday and can tell that a bad headache is about to come on. She went to her cardiologist yesterday and was given a good report.  -ND        Subjective Pain    Able to rate subjective pain? -- yes  -ND     Pre-Treatment Pain Level -- 3  -ND     Post-Treatment Pain Level -- 3  -ND        Total Minutes    85729 - PT Therapeutic Exercise Minutes 10  -ND --     10573 - PT Manual Therapy Minutes 25  -ND --        Exercise 1    Exercise Name 1 -- MH  -ND     Time 1 -- 10'  -ND        Exercise 2    Exercise Name 2 -- See Manual  -ND     Time 2 -- 25'  -ND        Exercise 3    Exercise Name 3 -- Standing cervical retraction w/ shoulder horizontal abduction  -ND     Cueing 3 -- Verbal;Demo  -ND     Sets 3 -- 1  -ND     Reps 3 -- 10  -ND     Time 3 -- 3\" hold  -ND     Additional Comments -- GTB  -ND        Exercise 4    Exercise Name 4 -- Standing cervical rotation with shoulder horizontal abduction  -ND     Cueing 4 -- Verbal;Demo  -ND     Sets 4 -- 1  -ND     Reps 4 -- 10  -ND     Additional Comments -- " GTB  -ND               User Key  (r) = Recorded By, (t) = Taken By, (c) = Cosigned By      Initials Name Provider Type    ND Damián Olivera, PT Physical Therapist                             Manual Rx (last 36 hours)       Manual Treatments       Row Name 06/09/23 0845             Total Minutes    40873 - PT Manual Therapy Minutes 25  -ND         Manual Rx 1    Manual Rx 1 Location Right UT, LS, and SCM  -ND      Manual Rx 1 Type MFR  -ND         Manual Rx 2    Manual Rx 2 Location Cervical spine  -ND      Manual Rx 2 Type Manual distraction and rotation PROM  -ND         Manual Rx 3    Manual Rx 3 Type SOR  -ND                User Key  (r) = Recorded By, (t) = Taken By, (c) = Cosigned By      Initials Name Provider Type    ND Damián Olivera, PT Physical Therapist                     PT OP Goals       Row Name 06/09/23 0845          PT Short Term Goals    STG Date to Achieve 05/15/23  -ND     STG 1 Pt to demonstrate I with HEP for postural strengthening and cervical stabilization  -ND     STG 1 Progress Progressing;Ongoing  -ND     STG 2 Pt to report decreased frequency and intensity of headaches that refer to her right eye by 50%.  -ND     STG 2 Progress Progressing;Ongoing  -ND     STG 3 Pt to have decreased TTP of posterior cervicothoracic muscles to slight levels.  -ND     STG 3 Progress Progressing;Ongoing  -ND     STG 4 Perform NDI  -ND     STG 4 Progress Met  -ND        Long Term Goals    LTG Date to Achieve 06/14/23  -ND     LTG 1 Pt to report decreased dizziness and epsodes of passing out by 50%  -ND     LTG 1 Progress New  -ND     LTG 2 Pt to tolerate performing 30 minutes of housework with only slight increaes in neck pain.  -ND     LTG 2 Progress New  -ND     LTG 3 NDI to improve by 10% to demonstrate improved tolerance with ADL's  -ND     LTG 3 Progress New  -ND        Time Calculation    PT Goal Re-Cert Due Date 06/14/23  -ND               User Key  (r) = Recorded By, (t) = Taken By, (c) = Cosigned By       Initials Name Provider Type    ND Damián Olivera, PT Physical Therapist                    Therapy Education  Education Details: Addition of standing cervical retraction and rotation withshoulder horizontal abduction to HEP  Given: HEP  Program: New, Progressed  How Provided: Verbal, Demonstration  Provided to: Patient  Level of Understanding: Teach back education performed, Verbalized, Demonstrated              Time Calculation:   Start Time: 0845  Stop Time: 0930  Time Calculation (min): 45 min  Total Timed Code Minutes- PT: 45 minute(s)  Timed Charges  07220 - PT Therapeutic Exercise Minutes: 10  67124 - PT Manual Therapy Minutes: 25  Untimed Charges  PT Moist Heat Minutes: 10  Total Minutes  Timed Charges Total Minutes: 35  Untimed Charges Total Minutes: 10   Total Minutes: 45  Therapy Charges for Today       Code Description Service Date Service Provider Modifiers Qty    88174965074 HC PT THER PROC EA 15 MIN 6/9/2023 Damián Olivera, PT GP 1    02994776937 HC PT MANUAL THERAPY EA 15 MIN 6/9/2023 Damián Olivera, PT GP 1                      Damián Olivera PT  6/9/2023

## 2023-06-19 ENCOUNTER — OFFICE VISIT (OUTPATIENT)
Dept: FAMILY MEDICINE CLINIC | Facility: CLINIC | Age: 43
End: 2023-06-19
Payer: COMMERCIAL

## 2023-06-19 VITALS
DIASTOLIC BLOOD PRESSURE: 86 MMHG | BODY MASS INDEX: 29.33 KG/M2 | SYSTOLIC BLOOD PRESSURE: 132 MMHG | WEIGHT: 182.5 LBS | HEART RATE: 75 BPM | OXYGEN SATURATION: 99 % | TEMPERATURE: 96.7 F | HEIGHT: 66 IN

## 2023-06-19 DIAGNOSIS — R29.90 STROKE-LIKE SYMPTOMS: ICD-10-CM

## 2023-06-19 DIAGNOSIS — Z09 HOSPITAL DISCHARGE FOLLOW-UP: Primary | ICD-10-CM

## 2023-06-19 NOTE — PROGRESS NOTES
"  Family Medicine Residency  Marv Haywood MD    Subjective:     Gosia Brown is a 42 y.o. female who presents for hospital discharge follow up.  This AM VSS. Pt is stable. Pt has no concerns but would like referral onto Neurology given recent ED visit.    6/11/2023 [facility] Adapted from note of [facility] Dr. Carlton:  \" HPI Gosia Brown is a 42 y.o. female with a history of afib, bipolar disorder presenting with episode of syncope along with left-sided weakness. Pt says she woke up this morning feeling dizzy and having blurry vision, along with nausea and \"whole body aching.\" Later on she says she developed weakness in both of her legs, with more in her left leg than right. She says she also experienced weakness in her left arm. She also developed numbness in her left face, left arm and left leg. Then she says \"I passed out.\" When asked what she was doing at the time, she says \"just lying down.\" She denies feeling any prodrome prior to this (ie. Worsening nausea, dizziness, vertigo, diaphoresis.) Daughter at bedside said she found her mother unconscious, says her left leg was \"shaking a lot.\" Neither the pt or the daughter know how long pt was unconscious for. Pt says that prior to have a neck procedure in October 2022 she used to pass out \"all the time.\" Daughter agreed about this, however says the shaking was new. In the ED, CTH and CTA head and neck were performed due to concerns for possible stroke, were unimpressive. When seen by this MD pt says she was still having numbness in her left face and left arm. Also says she has been feeling \"numb in my left throat for about 1 week.\" Denies any recent changes to her medications.     Case was discussed with the ED provider who felt patient warranted admission to hospitalist service. History was obtained from patient who seemed an accurate and reliable historian. Pt's mother and daughter were present.     Hospital Course: "     Syncope  - originally experienced light-headedness, blurry vision, nausea  - reportedly loss consciousness while lying down  - pt denies any worsening prodrome preceding episode  - daughter reports observing pt's left leg shaking  - CTA head/neck showed no significant stenosis  - monitor on telemetry to look for arrhythmias (known hx of afib)  - order EEG to check for possible seizures//EEG negative  - neurology consulted, will follow up recs//recommended D/C no follow up  - 6/12: no further syncopal episodes overnight; EEG negative, clear by neurology for discharge  - per neurology, if syncope occurs again recommends stop buproprion  - pt instructed to not drive for 90 days  - will have pt follow up with PCP in 1 week    Left-sided weakness/numbness  - was outside window for TNK upon arrival to ED  - CTH showed no acute findings  - CTA head/neck showed no significant stenosis  - MRI brain ordered, will follow up results  - start pt on ASA, atorvastatin  - check lipid profile, HA1c; consult PT/OT/SLP  - monitor serial neuro checks, NIHSS  - neurology consulted, will follow up recs  - 6/12: MRI negative, CVA rule out    Atrial fibrillation, paroxysmal  - currently stable  - continue metoprolol, Eliquis  - monitor on telemetry    Chronic pain syndrome  - hx of C4-7 fusion in Oct 2022  - TAMRA reviewed  - resume home meds once PTA list confirmed    Bipolar disorder  - currently stable  - resume home meds once PTA list confirmed     Consults: Dr. Lilly (Neurology) recommended NO follow up per patient.    The following portions of the patient's history were reviewed and updated as appropriate: allergies, current medications, past family history, past medical history, past social history, past surgical history, and problem list.    Past Medical Hx:  Past Medical History:   Diagnosis Date    Anemia     Anxiety     Arthritis     Back problem     Bipolar depression     Chronic pain disorder 2003    Depression      Diabetes mellitus 2019    On a strict diet so i dont become one. Sugar is staying elev    GERD (gastroesophageal reflux disease)     Heart problem     Heartburn     Panic disorder     Psychosis     PTSD (post-traumatic stress disorder)        Past Surgical Hx:  Past Surgical History:   Procedure Laterality Date    COLONOSCOPY N/A 02/08/2018    Procedure: COLONOSCOPY;  Surgeon: Geoff Frank MD;  Location: [facility] ENDOSCOPY;  Service:     COLONOSCOPY N/A 01/28/2020    Procedure: COLONOSCOPY;  Surgeon: Geoff Frank MD;  Location: [facility] ENDOSCOPY;  Service: Gastroenterology    ENDOSCOPY N/A 02/08/2018    Procedure: ESOPHAGOGASTRODUODENOSCOPY;  Surgeon: Geoff Frank MD;  Location: [facility] ENDOSCOPY;  Service:     ENDOSCOPY N/A 01/28/2020    Procedure: ESOPHAGOGASTRODUODENOSCOPY;  Surgeon: Geoff Frank MD;  Location: [facility] ENDOSCOPY;  Service: Gastroenterology    EYE SURGERY Right     SPINAL FUSION  09/27/2022    TUBAL ABDOMINAL LIGATION         Current Meds:    Current Outpatient Medications:     baclofen (LIORESAL) 20 MG tablet, Take 1 tablet by mouth Every 6 (Six) Hours. Taking twice daily d/t severe sleepiness, Disp: , Rfl:     buPROPion XL (Wellbutrin XL) 300 MG 24 hr tablet, Take 1 tablet by mouth Every Morning., Disp: 30 tablet, Rfl: 2    busPIRone (BUSPAR) 10 MG tablet, Take 1 tablet by mouth 3 (Three) Times a Day., Disp: 90 tablet, Rfl: 2    Eliquis 5 MG tablet tablet, , Disp: , Rfl:     gabapentin (NEURONTIN) 600 MG tablet, Take 1 tablet by mouth 3 (Three) Times a Day., Disp: , Rfl:     metoprolol succinate XL (TOPROL-XL) 50 MG 24 hr tablet, Take 1 tablet by mouth 2 (Two) Times a Day., Disp: , Rfl:     OXcarbazepine (Trileptal) 600 MG tablet, Take one oral tablet BID., Disp: 60 tablet, Rfl: 2    oxyCODONE-acetaminophen (PERCOCET) 7.5-325 MG per tablet, Take 1 tablet by mouth., Disp: , Rfl:     rosuvastatin (CRESTOR) 20 MG tablet, Take 1 tablet by mouth Every Evening., Disp: , Rfl:     valsartan  (DIOVAN) 80 MG tablet, Take 1 tablet by mouth Daily., Disp: , Rfl:     Allergies:  Allergies   Allergen Reactions    Regadenoson Unknown - High Severity     Seizures, petit mal  Other reaction(s): Unknown - High Severity  Seizures, petit mal    Lamotrigine Rash    Hydrocodone-Acetaminophen Itching       Family Hx:  Family History   Problem Relation Age of Onset    ADD / ADHD Mother     Arthritis Mother     Hypertension Mother     Ovarian cancer Mother     Bipolar disorder Father     Breast cancer Maternal Aunt     Lung cancer Maternal Aunt     Breast cancer Paternal Aunt     Colon cancer Maternal Uncle     Lung cancer Maternal Uncle     Heart disease Maternal Grandfather     Stroke Maternal Grandfather     Cancer Maternal Grandfather     Lung cancer Maternal Grandfather     Cancer Maternal Grandmother     Bipolar disorder Son     Bipolar disorder Daughter         Social History:  Social History     Socioeconomic History    Marital status: Single    Number of children: 3    Highest education level: Some college, no degree   Tobacco Use    Smoking status: Never    Smokeless tobacco: Never   Substance and Sexual Activity    Alcohol use: No    Drug use: No    Sexual activity: Defer     Birth control/protection: I.U.D., Tubal ligation       Review of Systems  Review of Systems   Constitutional:  Negative for activity change, appetite change, chills, diaphoresis, fatigue and fever.   HENT:  Negative for congestion, dental problem, ear discharge, ear pain, hearing loss, mouth sores, nosebleeds, postnasal drip, sinus pain, sore throat, tinnitus, trouble swallowing and voice change.    Eyes:  Negative for photophobia, pain, discharge and itching.   Respiratory:  Negative for cough, choking, chest tightness, shortness of breath and wheezing.    Cardiovascular:  Negative for chest pain, palpitations and leg swelling.   Gastrointestinal:  Negative for abdominal distention, abdominal pain, blood in stool, constipation, diarrhea,  "nausea and vomiting.   Endocrine: Negative for cold intolerance and heat intolerance.   Genitourinary:  Negative for difficulty urinating, dysuria, flank pain and hematuria.   Musculoskeletal:  Negative for back pain, joint swelling and neck pain.   Skin:  Negative for color change and rash.   Neurological:  Negative for dizziness, tremors, seizures, weakness, light-headedness, numbness and headaches.   Hematological:  Negative for adenopathy.   Psychiatric/Behavioral:  Negative for behavioral problems, confusion, hallucinations, self-injury and sleep disturbance.      Objective:     /86   Pulse 75   Temp 96.7 °F (35.9 °C)   Ht 167.6 cm (66\")   Wt 82.8 kg (182 lb 8 oz)   SpO2 99%   BMI 29.46 kg/m²   Physical Exam  Vitals and nursing note reviewed.   Constitutional:       Appearance: Normal appearance. She is not ill-appearing or diaphoretic.   HENT:      Head: Normocephalic and atraumatic.      Right Ear: External ear normal.      Left Ear: External ear normal.      Nose: Nose normal. No rhinorrhea.      Mouth/Throat:      Mouth: Mucous membranes are moist.      Pharynx: No posterior oropharyngeal erythema.   Eyes:      General: No scleral icterus.        Right eye: No discharge.         Left eye: No discharge.      Extraocular Movements: Extraocular movements intact.   Neck:      Vascular: No carotid bruit.   Cardiovascular:      Rate and Rhythm: Normal rate and regular rhythm.      Pulses: Normal pulses.      Heart sounds: Normal heart sounds.     No gallop.   Pulmonary:      Effort: Pulmonary effort is normal.      Breath sounds: Normal breath sounds. No wheezing.   Chest:      Chest wall: No tenderness.   Abdominal:      General: Bowel sounds are normal.      Palpations: Abdomen is soft.      Tenderness: There is no rebound.   Musculoskeletal:         General: No swelling.      Cervical back: No muscular tenderness.      Right lower leg: No edema.      Left lower leg: No edema.   Lymphadenopathy:     "  Cervical: No cervical adenopathy.   Skin:     General: Skin is warm and dry.      Capillary Refill: Capillary refill takes less than 2 seconds.      Findings: No erythema or rash.   Neurological:      General: No focal deficit present.      Mental Status: She is alert and oriented to person, place, and time.      Motor: No weakness.   Psychiatric:         Mood and Affect: Mood normal.         Behavior: Behavior normal.         Thought Content: Thought content normal.         Judgment: Judgment normal.        Assessment   Gosia Brown is a 42 y.o. female who presents for:    Hospital discharge follow-up:  Patient attended [facility] for seizure or strokelike symptoms which was negative  Patient was evaluated by neuro and discharged to care.  Neurology at [facility] advised the patient not follow-up with their clinic.  However the patient and her family has some concern that she needs to follow-up with neurology.  I have agreed to refer the patient onto neurology given the nature of her symptoms in June.  Patient doing well and has had no episodes since her hospital admission at [facility].  Patient reports that her symptoms are intermittent maybe once or twice a year.  The patient is happy that she has been referred on for further evaluation with neurology within the [facility].    Cervical fusion:  Patient under Dr. Esparza at [facility]   Patient continues under surgeon in [place] for cervical spine surgery.  C4-7 spinal fusion on 27th September 2022.  Patient advised to call her surgeon in [place] IF ISSUES  Patient advised to go to the ED should her symptoms worsen WITH NECK PAIN   Patient in full agreement with the plan of care     Atrial fibrillation:  Referred onto cardiology for intervention, Dr. Velásquez     Controlled substances Yogi:  Yogi: PDMP  Patient has gabapentin and oxycodone refilled by Fabienne Oneill.  As per previous visit patient educated in  relation to that these medications would not be refilled at this clinic. Patient agreement and continues to understand.     History of depression:  Pt BIPOLAR AND MANIC DEPRESSION continues UNDER JULIO.  Julio manage medication and talk therapy  Has review with  August 2023    The patient was educated on the risks, benefits and alternatives to therapy. The patient was in agreement with therapy and plan of care.    Plan     Next visit check to see if patient attended neurology  Next visit check patients medication regime.  Next visit check patients compliance to medication regime.    Diagnoses and all orders for this visit:    1. Hospital discharge follow-up (Primary)  -     Ambulatory Referral to Neurology    2. Stroke-like symptoms  -     Ambulatory Referral to Neurology      Rx changes: As above  Patient Education: Advised and educated the patient in relation to diet, lifestyle, healthcare goals and EDUCATED ON NEUROLOGICAL SYMPTOMS.  Compliance at present is estimated to be good.   Efforts to improve compliance (if necessary) will be directed at increased exercise.    Depression screening: Depression screening performed today; result negative; no follow up needed       PHQ-2 Depression Screening  Little interest or pleasure in doing things?  0   Feeling down, depressed, or hopeless?  0   PHQ-2 Total Score  0        PHQ-2 Total Score:   0      Patient has no suicidal, no homicidal ideation.    Follow-up:     Return in about 3 months (around 9/19/2023) for Recheck.    Preventative:  Health Maintenance   Topic Date Due    ANNUAL PHYSICAL  10/10/2023 (Originally 12/6/2017)    COVID-19 Vaccine (3 - Pfizer series) 10/12/2023 (Originally 3/25/2022)    PAP SMEAR  09/04/2023    INFLUENZA VACCINE  10/01/2023    COLORECTAL CANCER SCREENING  01/28/2025    TDAP/TD VACCINES (2 - Td or Tdap) 10/14/2029    HEPATITIS C SCREENING  Completed    Pneumococcal Vaccine 0-64  Aged Out     Female Preventative: Does monthly  breast self examination  Recommended: none  Vaccine Counseling: N/A    Weight  -Class: Overweight: 25.0-29.9kg/m2   -    eat more fruits and vegetables, decrease soda or juice intake, increase water intake, increase physical activity, reduce screen time, reduce portion size, cut out extra servings, reduce fast food intake, family to eat at dinner table more often, and keep TV off during meals    Alcohol use:  reports no history of alcohol use.  Nicotine status  reports that she has never smoked. She has never used smokeless tobacco.     Goals    None         RISK SCORE: 2    Yogi request #PDMP. Reviewed and appropriate.     Signature  [place] [address]  Office: [phone]      This document has been electronically signed by Marv Haywood MD on June 19, 2023 09:52 CDT      Portions of this note were copied from progress note written by Dr. Haywood; note reviewed and updated as appropriate.    Part of this note may be an electronic transcription/translation of spoken language to printed text using the Dragon Dictation System.

## 2023-07-31 ENCOUNTER — TELEMEDICINE (OUTPATIENT)
Dept: PSYCHIATRY | Facility: CLINIC | Age: 43
End: 2023-07-31
Payer: COMMERCIAL

## 2023-07-31 DIAGNOSIS — F31.30 BIPOLAR I DISORDER, MOST RECENT EPISODE DEPRESSED: ICD-10-CM

## 2023-07-31 DIAGNOSIS — F41.1 GAD (GENERALIZED ANXIETY DISORDER): ICD-10-CM

## 2023-07-31 RX ORDER — OXCARBAZEPINE 600 MG/1
TABLET, FILM COATED ORAL
Qty: 60 TABLET | Refills: 5 | Status: SHIPPED | OUTPATIENT
Start: 2023-07-31

## 2023-07-31 RX ORDER — BUPROPION HYDROCHLORIDE 300 MG/1
300 TABLET ORAL EVERY MORNING
Qty: 30 TABLET | Refills: 5 | Status: SHIPPED | OUTPATIENT
Start: 2023-07-31

## 2023-07-31 RX ORDER — BUSPIRONE HYDROCHLORIDE 10 MG/1
10 TABLET ORAL 3 TIMES DAILY
Qty: 90 TABLET | Refills: 5 | Status: SHIPPED | OUTPATIENT
Start: 2023-07-31

## 2023-08-17 ENCOUNTER — OFFICE VISIT (OUTPATIENT)
Dept: BEHAVIORAL HEALTH | Facility: CLINIC | Age: 43
End: 2023-08-17
Payer: COMMERCIAL

## 2023-08-17 DIAGNOSIS — F33.1 MAJOR DEPRESSIVE DISORDER, RECURRENT EPISODE, MODERATE: Primary | ICD-10-CM
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
## 2023-09-25 ENCOUNTER — TELEMEDICINE (OUTPATIENT)
Dept: PSYCHIATRY | Facility: CLINIC | Age: 43
End: 2023-09-25
Payer: COMMERCIAL

## 2023-09-25 DIAGNOSIS — F31.30 BIPOLAR I DISORDER, MOST RECENT EPISODE DEPRESSED: Primary | ICD-10-CM

## 2023-09-25 DIAGNOSIS — F41.1 GAD (GENERALIZED ANXIETY DISORDER): ICD-10-CM
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
## 2023-10-11 ENCOUNTER — TELEMEDICINE (OUTPATIENT)
Dept: PSYCHIATRY | Facility: CLINIC | Age: 43
End: 2023-10-11
Payer: COMMERCIAL

## 2023-10-11 ENCOUNTER — TELEPHONE (OUTPATIENT)
Dept: PSYCHIATRY | Facility: CLINIC | Age: 43
End: 2023-10-11
Payer: COMMERCIAL

## 2023-10-11 NOTE — TELEPHONE ENCOUNTER
Patient decided to make an appointment with provider for tomorrow 10/12/2023 and she will just discuss this with the provider then.

## 2023-10-11 NOTE — TELEPHONE ENCOUNTER
"Patient called stating she just hung up with her therapist and she suggested that provider needs to up the dosage or change patient's medication. Patient states she is very \"mean and can't stand people.\"  Patient's fiance is noticing a huge difference with her attitude patient states.  Patient states her temper is very short and she doesn't care if she hurts people's feelings or not.  Patient states this is not all the time but it is sometimes.  Patient states she took a pill years ago that helped but she can't remember the name of it.  She states it was a hard pill and it was extended release.  Please advise.   "

## 2023-10-11 NOTE — LETTER
"October 11, 2023     Dian Singh, SCOTT  [address] [place] [address]    Patient: Gosia Brown   YOB: 1980   Date of Visit: 10/11/2023     Dear SCOTT Soria:       Thank you for referring Gosia Brown to me for evaluation. Below are the relevant portions of my assessment and plan of care.    Please speak with Gosia as soon as possible based on our visit today for initial mental health evaluation. She was open to share her mental health history, including describing herself as an \"evil sister\" when she is not on the right dosage of medication for Bi-polar. Evidently, there are signs currently that she is regressing according to comments her significant other and family members share with her. She reports a medication called \"Tegretol\" working well for her in the past and we discussed the \"Trileptal\" that she is on now. Please help Gosia with her medication needs as I understand that is not my field of practice.     Gosia stated she is not seeking medication for any other purpose than treating the Bi-polar condition, even stating that she does not want her pain medications increased.     If you have questions, please do not hesitate to call me. I look forward to following Gosia along with you.         Sincerely,        Sapphire Peña, [facility]        CC: No Recipients   "
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
## 2023-10-12 ENCOUNTER — TELEMEDICINE (OUTPATIENT)
Dept: PSYCHIATRY | Facility: CLINIC | Age: 43
End: 2023-10-12
Payer: COMMERCIAL

## 2023-10-12 DIAGNOSIS — Z79.899 ENCOUNTER FOR LONG-TERM (CURRENT) USE OF OTHER MEDICATIONS: ICD-10-CM

## 2023-10-12 DIAGNOSIS — F31.30 BIPOLAR I DISORDER, MOST RECENT EPISODE DEPRESSED: Primary | ICD-10-CM

## 2023-10-12 DIAGNOSIS — F41.1 GAD (GENERALIZED ANXIETY DISORDER): ICD-10-CM

## 2023-10-12 RX ORDER — CARBAMAZEPINE 200 MG/1
TABLET, EXTENDED RELEASE ORAL
Qty: 60 TABLET | Refills: 2 | Status: SHIPPED | OUTPATIENT
Start: 2023-10-12

## 2023-10-12 NOTE — PATIENT INSTRUCTIONS
For concerns or needing assistance call the [facility] at [phone]  Start Tegretol 200mg extended release, take at nighttime only for 1 week then increase to morning and night  Wean from Trileptal-take 1 tablet every morning only while starting Tegretol.  After 1 week discontinue  Labs ordered, fasting, go to [facility] [place], operated by [facility] lab.  Go 2 weeks after starting Tegretol  Monitor for mood changes, any drug adverse reaction, worsening of mood call the above number or go to nearest urgent care
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
## 2023-10-31 ENCOUNTER — TELEMEDICINE (OUTPATIENT)
Dept: PSYCHIATRY | Facility: CLINIC | Age: 43
End: 2023-10-31
Payer: COMMERCIAL

## 2023-10-31 DIAGNOSIS — F41.1 GAD (GENERALIZED ANXIETY DISORDER): ICD-10-CM

## 2023-10-31 DIAGNOSIS — F31.30 BIPOLAR I DISORDER, MOST RECENT EPISODE DEPRESSED: Primary | ICD-10-CM
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
## 2023-10-31 NOTE — PATIENT INSTRUCTIONS
For concerns or needing assistance call the [facility] at [phone]  Continue Wellbutrin 300 XL every morning  Continue Tegretol- mg BID  Get labs as previously ordered  Continue Buspar 10mg TID for anxiety  Follow-up with prescriber of Eliquis due to drug-drug interaction with Tegretol

## 2023-11-15 ENCOUNTER — TELEMEDICINE (OUTPATIENT)
Dept: PSYCHIATRY | Facility: CLINIC | Age: 43
End: 2023-11-15
Payer: COMMERCIAL

## 2023-11-15 ENCOUNTER — TELEPHONE (OUTPATIENT)
Dept: PSYCHIATRY | Facility: CLINIC | Age: 43
End: 2023-11-15

## 2023-11-15 DIAGNOSIS — Z79.899 ENCOUNTER FOR LONG-TERM (CURRENT) USE OF OTHER MEDICATIONS: ICD-10-CM

## 2023-11-15 DIAGNOSIS — R45.4 IRRITABILITY: ICD-10-CM

## 2023-11-15 DIAGNOSIS — F31.30 BIPOLAR I DISORDER, MOST RECENT EPISODE DEPRESSED: Primary | ICD-10-CM
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
## 2023-11-15 NOTE — PROGRESS NOTES
Called lab at [place]. Peg stated pt was frustrated re: the lab order issue and she left without getting the lab drawn. Peg stated that she called pt after lab order was signed and pt stated she would not come back today but would come back to lab tomorrow to get blood drawn.

## 2023-11-21 ENCOUNTER — TELEPHONE (OUTPATIENT)
Dept: PSYCHIATRY | Facility: CLINIC | Age: 43
End: 2023-11-21
Payer: COMMERCIAL

## 2023-11-21 NOTE — PROGRESS NOTES
Spoke to pharmacist Emilia re: medication. She has also called office of Sandra Arthur at [facility] re: the Eliquis. Called [facility] at [phone] to speak with Sandra Arthur, prescriber of the eliquis. Provider  not available. Left message.

## 2023-11-21 NOTE — TELEPHONE ENCOUNTER
I called the lab at [place] and pt still has not gotten her tegretol level drawn. Please call pt and let her know that she has to get that lab drawn before I will reorder the tegretol.     Dian Singh

## 2023-11-21 NOTE — TELEPHONE ENCOUNTER
"She said she had to go to [facility] for \"Jose\" and it was unavoidable. She understands you can not reorder until its drawn she will try to get it done soon.   "

## 2023-11-21 NOTE — TELEPHONE ENCOUNTER
[facility] had called and stated the patients heart doctor has them on Eliquis and they have to be on this medication for their heart. The pharmacy stated that Eliquis and TEGretol-XR have a major interaction with each other. Pharmacy is wondering if there can be soemthing else called in since they need to remain on the Eliquis. Please advise

## 2023-11-22 ENCOUNTER — TELEPHONE (OUTPATIENT)
Dept: PSYCHIATRY | Facility: CLINIC | Age: 43
End: 2023-11-22
Payer: COMMERCIAL

## 2023-11-22 NOTE — TELEPHONE ENCOUNTER
Pt called back stating that she really wants to stay on Eliquis medication that's prescribed by her heart doctor. They were going to try her on Warfarin, but patient does not want to go on that because it makes her bleed more on menstrual cycle. Pt is wondering if anything else can be called in other then the TEGretol. Pt is out of medication. Please advise.

## 2023-11-22 NOTE — TELEPHONE ENCOUNTER
Pt called stating pharmacy coreyt fill her Carbamazepine wanting on heart doctor to call them.Pt stated she can not go without her medication.Patient is wanting to know can you switch her to a different medication.Pt request for medication sent to [facility].Please advise

## 2023-11-22 NOTE — TELEPHONE ENCOUNTER
Pt called stating that she has no medication at all to take for the bi-polar.Pt is schedule for 11/27/23.Please advise

## 2023-11-22 NOTE — TELEPHONE ENCOUNTER
I am out of the office until Monday. I have heard from her cardiology provider today and that provider does not want her to take tegretol. She needs to continue her other medications and not take the tegretol. If she is not doing well until then, needs to seek emergency services, call 9-1-1. I believe she would be better served with in person services. Can make appt for next week to discuss.

## 2023-11-24 NOTE — TELEPHONE ENCOUNTER
Again, pt is to continue with current medications until appt. She has been on multiple mood stabilizers and other medications, none of which have been effective per her past reports. Will discuss at appt.

## 2023-11-27 ENCOUNTER — TELEMEDICINE (OUTPATIENT)
Dept: PSYCHIATRY | Facility: CLINIC | Age: 43
End: 2023-11-27
Payer: COMMERCIAL

## 2023-11-27 DIAGNOSIS — Z79.899 ENCOUNTER FOR LONG-TERM (CURRENT) USE OF OTHER MEDICATIONS: ICD-10-CM

## 2023-11-27 DIAGNOSIS — F31.30 BIPOLAR I DISORDER, MOST RECENT EPISODE DEPRESSED: Primary | ICD-10-CM

## 2023-11-27 PROCEDURE — 99214 OFFICE O/P EST MOD 30 MIN: CPT | Performed by: NURSE PRACTITIONER

## 2023-11-27 PROCEDURE — 1159F MED LIST DOCD IN RCRD: CPT | Performed by: NURSE PRACTITIONER

## 2023-11-27 PROCEDURE — 1160F RVW MEDS BY RX/DR IN RCRD: CPT | Performed by: NURSE PRACTITIONER

## 2023-11-27 RX ORDER — LITHIUM CARBONATE 300 MG/1
300 CAPSULE ORAL 2 TIMES DAILY WITH MEALS
Qty: 60 CAPSULE | Refills: 0 | Status: SHIPPED | OUTPATIENT
Start: 2023-11-27
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
## 2023-11-27 NOTE — PATIENT INSTRUCTIONS
For concerns or needing assistance call the [facility] at [phone]  Start lithium as mood stabilizer, 300 mg twice a day, take with food  On Friday morning December 1 go to [facility] lab in [place], hold morning lithium dose until lab drawn  Provider to notify patient of results, follow-up appointment to be made at that time.  Continue BuSpar, and Wellbutrin as ordered.

## 2023-11-28 ENCOUNTER — TELEPHONE (OUTPATIENT)
Dept: PSYCHIATRY | Facility: CLINIC | Age: 43
End: 2023-11-28
Payer: COMMERCIAL

## 2023-11-28 NOTE — TELEPHONE ENCOUNTER
Pt called and stated that the [facility] pharmacy has requested for each provider to give them a call to make sure the medications they are on are not going to react with each other. Please advise

## 2023-11-28 NOTE — TELEPHONE ENCOUNTER
Spoke to pharmacist, this was not the message she was given according to pharmacist but I did clear it up. No issues with meds I prescribe.

## 2023-12-05 ENCOUNTER — TELEPHONE (OUTPATIENT)
Dept: PSYCHIATRY | Facility: CLINIC | Age: 43
End: 2023-12-05
Payer: COMMERCIAL

## 2023-12-05 NOTE — TELEPHONE ENCOUNTER
Pt wanted to make provider aware that she did go get labs yesterday but when she got the results in her mychart last evening she realized they had tested the wrong thing so she is going back today to have her Lithium Level checked.

## 2023-12-06 ENCOUNTER — TELEPHONE (OUTPATIENT)
Dept: PSYCHIATRY | Facility: CLINIC | Age: 43
End: 2023-12-06
Payer: COMMERCIAL

## 2023-12-06 DIAGNOSIS — Z79.899 ENCOUNTER FOR LONG-TERM (CURRENT) USE OF OTHER MEDICATIONS: Primary | ICD-10-CM

## 2023-12-06 NOTE — PATIENT INSTRUCTIONS
INCREASE evening lithium dosage to (2) 300mg capsules (600mg total)  Continue with 300mg for morning dosage  Lab to be drawn AM on December 11.  HOLD morning lithium dosage on lab date until blood is drawn  Then take medication dosage for that morning.  Follow up appt will be December 13 At 1030 AM EST, 0930 AM Central time.

## 2023-12-06 NOTE — PROGRESS NOTES
"Called pt re: Lithium level being 0.3mg, suboptimal.   Pt reports having \"good days and bad days\" re: Mood.   No side effects reported.  Agreeable to increase Lithium dosage to 600mg HS and keep 300mg AM dosage.   Lab order to draw Lithium level Monday morning 12/11/2023 at [facility].  Order entered and staff notified to fax the order to that clinic lab.   Follow up appt for 1030 eastern time Wednesday December 13.   "

## 2023-12-13 DIAGNOSIS — F31.30 BIPOLAR I DISORDER, MOST RECENT EPISODE DEPRESSED: ICD-10-CM

## 2023-12-13 RX ORDER — LITHIUM CARBONATE 300 MG/1
CAPSULE ORAL
Qty: 90 CAPSULE | Refills: 0 | Status: SHIPPED | OUTPATIENT
Start: 2023-12-13

## 2023-12-18 ENCOUNTER — TELEMEDICINE (OUTPATIENT)
Dept: PSYCHIATRY | Facility: CLINIC | Age: 43
End: 2023-12-18
Payer: COMMERCIAL

## 2023-12-18 DIAGNOSIS — F41.1 GAD (GENERALIZED ANXIETY DISORDER): ICD-10-CM

## 2023-12-18 DIAGNOSIS — F31.30 BIPOLAR I DISORDER, MOST RECENT EPISODE DEPRESSED: Primary | ICD-10-CM
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
## 2024-01-09 ENCOUNTER — TELEMEDICINE (OUTPATIENT)
Dept: PSYCHIATRY | Facility: CLINIC | Age: 44
End: 2024-01-09
Payer: COMMERCIAL

## 2024-01-09 DIAGNOSIS — Z79.899 ENCOUNTER FOR LONG-TERM (CURRENT) USE OF OTHER MEDICATIONS: ICD-10-CM

## 2024-01-09 DIAGNOSIS — F41.1 GAD (GENERALIZED ANXIETY DISORDER): ICD-10-CM

## 2024-01-09 DIAGNOSIS — F31.30 BIPOLAR I DISORDER, MOST RECENT EPISODE DEPRESSED: Primary | ICD-10-CM

## 2024-01-09 PROCEDURE — 99214 OFFICE O/P EST MOD 30 MIN: CPT | Performed by: NURSE PRACTITIONER

## 2024-01-09 PROCEDURE — 1160F RVW MEDS BY RX/DR IN RCRD: CPT | Performed by: NURSE PRACTITIONER

## 2024-01-09 PROCEDURE — 1159F MED LIST DOCD IN RCRD: CPT | Performed by: NURSE PRACTITIONER

## 2024-01-09 RX ORDER — BUSPIRONE HYDROCHLORIDE 10 MG/1
10 TABLET ORAL 3 TIMES DAILY
Qty: 90 TABLET | Refills: 5 | Status: SHIPPED | OUTPATIENT
Start: 2024-01-09

## 2024-01-09 RX ORDER — LITHIUM CARBONATE 300 MG/1
CAPSULE ORAL
Qty: 90 CAPSULE | Refills: 3 | Status: SHIPPED | OUTPATIENT
Start: 2024-01-09

## 2024-01-09 RX ORDER — BUPROPION HYDROCHLORIDE 300 MG/1
300 TABLET ORAL EVERY MORNING
Qty: 30 TABLET | Refills: 5 | Status: SHIPPED | OUTPATIENT
Start: 2024-01-09
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
## 2024-01-09 NOTE — PATIENT INSTRUCTIONS
For concerns or needing assistance call the Behavioral Health [facility] at [phone]  Should you ever need assistance or just want to reach out to someone when your behavioral health provider is not available due to the office being closed you can contact [email or web address]. Just text HOME to [phone] and someone will reach out to you within a few minutes.  This is a 24/7 help line and they are open holidays. Be sure and let us know as soon as our office opens so we can get you in for a follow up.  As always, go to the closest emergency room or call 911 if you feel you need immediate assistance.    Continue Wellbutrin 300 XL every morning  Continue lithium 300 mg capsule, 1 capsule every morning, 2 capsules in the evening  Continue BuSpar 10 mg 3 times a day.  [facility].  Patient goes on January 11, instructed to hold morning dose of lithium until after blood drawn.

## 2024-01-18 ENCOUNTER — TELEPHONE (OUTPATIENT)
Dept: PSYCHIATRY | Facility: CLINIC | Age: 44
End: 2024-01-18
Payer: COMMERCIAL

## 2024-01-18 NOTE — TELEPHONE ENCOUNTER
Pt called and requested that the Lithium Level lab order be refaxed to the lab.    I have refaxed the lab order.

## 2024-01-30 ENCOUNTER — TELEMEDICINE (OUTPATIENT)
Dept: PSYCHIATRY | Facility: CLINIC | Age: 44
End: 2024-01-30
Payer: COMMERCIAL

## 2024-01-30 DIAGNOSIS — F41.1 GAD (GENERALIZED ANXIETY DISORDER): ICD-10-CM

## 2024-01-30 DIAGNOSIS — F31.30 BIPOLAR I DISORDER, MOST RECENT EPISODE DEPRESSED: Primary | ICD-10-CM

## 2024-01-30 PROCEDURE — 1160F RVW MEDS BY RX/DR IN RCRD: CPT | Performed by: NURSE PRACTITIONER

## 2024-01-30 PROCEDURE — 1159F MED LIST DOCD IN RCRD: CPT | Performed by: NURSE PRACTITIONER

## 2024-01-30 PROCEDURE — 99214 OFFICE O/P EST MOD 30 MIN: CPT | Performed by: NURSE PRACTITIONER

## 2024-01-30 RX ORDER — CIPROFLOXACIN 500 MG/1
500 TABLET, FILM COATED ORAL
COMMUNITY
Start: 2024-01-29 | End: 2024-02-06

## 2024-01-30 RX ORDER — ERGOCALCIFEROL 1.25 MG/1
50000 CAPSULE ORAL
COMMUNITY
Start: 2024-01-11 | End: 2024-02-11

## 2024-01-30 RX ORDER — CYANOCOBALAMIN 1000 UG/ML
1000 INJECTION, SOLUTION INTRAMUSCULAR; SUBCUTANEOUS
COMMUNITY
Start: 2024-01-09

## 2024-01-30 NOTE — PATIENT INSTRUCTIONS
For concerns or needing assistance call the Behavioral Health [facility] at [phone]  Should you ever need assistance or just want to reach out to someone when your behavioral health provider is not available due to the office being closed you can contact [email or web address]. Just text HOME to [phone] and someone will reach out to you within a few minutes.  This is a 24/7 help line and they are open holidays. Be sure and let us know as soon as our office opens so we can get you in for a follow up.  As always, go to the closest emergency room or call 911 if you feel you need immediate assistance.    Continue Wellbutrin 300 XL every morning  Continue lithium 300 mg capsule, 1 capsule every morning, 2 capsules in the evening  Continue BuSpar 10 mg 3 times a day.
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
## 2024-03-11 ENCOUNTER — TELEMEDICINE (OUTPATIENT)
Dept: PSYCHIATRY | Facility: CLINIC | Age: 44
End: 2024-03-11
Payer: COMMERCIAL

## 2024-03-11 DIAGNOSIS — F41.1 GAD (GENERALIZED ANXIETY DISORDER): ICD-10-CM

## 2024-03-11 DIAGNOSIS — Z79.899 ENCOUNTER FOR LONG-TERM (CURRENT) USE OF OTHER MEDICATIONS: ICD-10-CM

## 2024-03-11 DIAGNOSIS — F31.30 BIPOLAR I DISORDER, MOST RECENT EPISODE DEPRESSED: Primary | ICD-10-CM
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
## 2024-03-11 NOTE — PATIENT INSTRUCTIONS
For concerns or needing assistance call the Behavioral Health [facility] at [phone]  Should you ever need assistance or just want to reach out to someone when your behavioral health provider is not available due to the office being closed you can contact [email or web address]. Just text HOME to [phone] and someone will reach out to you within a few minutes.  This is a 24/7 help line and they are open holidays. Be sure and let us know as soon as our office opens so we can get you in for a follow up.  As always, go to the closest emergency room or call 911 if you feel you need immediate assistance.    Continue Wellbutrin 300 XL every morning  Continue lithium 300 mg capsule, 1 capsule every morning, 2 capsules in the evening  Continue BuSpar 10 mg 3 times a day.  Lithium level to be drawn morning of March 12, 2024.  Hold morning lithium prior to lab draw.

## 2024-04-09 ENCOUNTER — TELEMEDICINE (OUTPATIENT)
Dept: PSYCHIATRY | Facility: CLINIC | Age: 44
End: 2024-04-09
Payer: COMMERCIAL

## 2024-04-09 DIAGNOSIS — F10.10 ALCOHOL ABUSE, EPISODIC DRINKING BEHAVIOR: ICD-10-CM

## 2024-04-09 DIAGNOSIS — Z79.899 ENCOUNTER FOR LONG-TERM (CURRENT) USE OF OTHER MEDICATIONS: ICD-10-CM

## 2024-04-09 DIAGNOSIS — F31.10 BIPOLAR I DISORDER, MOST RECENT EPISODE MANIC: Primary | ICD-10-CM

## 2024-04-09 RX ORDER — LITHIUM CARBONATE 600 MG/1
CAPSULE ORAL
Qty: 60 CAPSULE | Refills: 3 | Status: SHIPPED | OUTPATIENT
Start: 2024-04-09

## 2024-04-09 NOTE — PATIENT INSTRUCTIONS
For concerns or needing assistance call the [facility] at [phone]  Should you ever need assistance or just want to reach out to someone when your behavioral health provider is not available due to the office being closed you can contact [email or web address]. Just text HOME to [phone] and someone will reach out to you within a few minutes.  This is a 24/7 help line and they are open holidays. Be sure and let us know as soon as our office opens so we can get you in for a follow up.  As always, go to the closest emergency room or call 911 if you feel you need immediate assistance.    Continue Wellbutrin 300 XL every morning  Increase Lithium to 600mg BID  Lithium level to be drawn 04/15- hold morning dose of lithium  Continue BuSpar 10 mg 3 times a day.
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
## 2024-04-16 ENCOUNTER — TELEMEDICINE (OUTPATIENT)
Dept: PSYCHIATRY | Facility: CLINIC | Age: 44
End: 2024-04-16
Payer: COMMERCIAL

## 2024-04-16 DIAGNOSIS — F12.90 EPISODIC CANNABIS USE: ICD-10-CM

## 2024-04-16 DIAGNOSIS — F31.10 BIPOLAR I DISORDER, MOST RECENT EPISODE MANIC: Primary | ICD-10-CM

## 2024-04-16 DIAGNOSIS — F19.90 SUBSTANCE USE: ICD-10-CM

## 2024-04-16 DIAGNOSIS — F41.1 GAD (GENERALIZED ANXIETY DISORDER): ICD-10-CM
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
## 2024-04-16 NOTE — PATIENT INSTRUCTIONS
For concerns or needing assistance call the Behavioral Health [facility] at [phone]  Should you ever need assistance or just want to reach out to someone when your behavioral health provider is not available due to the office being closed you can contact [email or web address]. Just text HOME to [phone] and someone will reach out to you within a few minutes.  This is a 24/7 help line and they are open holidays. Be sure and let us know as soon as our office opens so we can get you in for a follow up.  As always, go to the closest emergency room or call 911 if you feel you need immediate assistance.    Continue Wellbutrin 300 XL every morning  Continue lithium  600mg BID  Continue BuSpar 10 mg 3 times a day.  Primary care appointment tomorrow-urine drug screen for confirmation, referral for inpatient psychiatric services for higher level of care-discussed with SCOTT Barrios

## 2024-05-16 ENCOUNTER — TELEMEDICINE (OUTPATIENT)
Dept: PSYCHIATRY | Facility: CLINIC | Age: 44
End: 2024-05-16
Payer: COMMERCIAL

## 2024-05-16 DIAGNOSIS — F41.1 GAD (GENERALIZED ANXIETY DISORDER): ICD-10-CM

## 2024-05-16 DIAGNOSIS — F31.10 BIPOLAR I DISORDER, MOST RECENT EPISODE MANIC: Primary | ICD-10-CM

## 2024-05-16 RX ORDER — HYDROCHLOROTHIAZIDE 12.5 MG/1
12.5 TABLET ORAL DAILY
COMMUNITY
Start: 2024-04-17 | End: 2024-10-15

## 2024-05-16 RX ORDER — TRAZODONE HYDROCHLORIDE 50 MG/1
50 TABLET ORAL NIGHTLY
Qty: 30 TABLET | Refills: 2 | Status: SHIPPED | OUTPATIENT
Start: 2024-05-16
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
## 2024-05-16 NOTE — PATIENT INSTRUCTIONS
For concerns or needing assistance call the [facility] at [phone]  Should you ever need assistance or just want to reach out to someone when your behavioral health provider is not available due to the office being closed you can contact [email or web address]. Just text HOME to [phone] and someone will reach out to you within a few minutes.  This is a 24/7 help line and they are open holidays. Be sure and let us know as soon as our office opens so we can get you in for a follow up.  As always, go to the closest emergency room or call 911 if you feel you need immediate assistance.    DECREASE lithium to 300mg in the AM and 600mg in PM d/t being on HCTZ  START trazodone 50mg tablet at bedtime to help with insomnia- take 1/2 to one tablet  Continue with Wellbutrin at 300mg every morning  Continue with bUspar 10 mg three times daily for anxiety  Call Dr. Powell office at [place] [facility] for appt.

## 2024-06-19 ENCOUNTER — TELEMEDICINE (OUTPATIENT)
Dept: PSYCHIATRY | Facility: CLINIC | Age: 44
End: 2024-06-19
Payer: COMMERCIAL

## 2024-06-19 DIAGNOSIS — F31.10 BIPOLAR I DISORDER, MOST RECENT EPISODE MANIC: Primary | ICD-10-CM

## 2024-06-19 DIAGNOSIS — F41.1 GAD (GENERALIZED ANXIETY DISORDER): ICD-10-CM

## 2024-06-19 PROCEDURE — 1159F MED LIST DOCD IN RCRD: CPT | Performed by: NURSE PRACTITIONER

## 2024-06-19 PROCEDURE — 1160F RVW MEDS BY RX/DR IN RCRD: CPT | Performed by: NURSE PRACTITIONER

## 2024-06-19 PROCEDURE — 99214 OFFICE O/P EST MOD 30 MIN: CPT | Performed by: NURSE PRACTITIONER

## 2024-06-19 RX ORDER — LITHIUM CARBONATE 600 MG/1
CAPSULE ORAL
Qty: 60 CAPSULE | Refills: 3 | Status: SHIPPED | OUTPATIENT
Start: 2024-06-19

## 2024-06-19 RX ORDER — BUSPIRONE HYDROCHLORIDE 10 MG/1
10 TABLET ORAL 3 TIMES DAILY
Qty: 90 TABLET | Refills: 5 | Status: SHIPPED | OUTPATIENT
Start: 2024-06-19

## 2024-06-19 RX ORDER — BUPROPION HYDROCHLORIDE 300 MG/1
300 TABLET ORAL EVERY MORNING
Qty: 30 TABLET | Refills: 5 | Status: SHIPPED | OUTPATIENT
Start: 2024-06-19

## 2024-06-19 NOTE — PATIENT INSTRUCTIONS
For concerns or needing assistance call the [facility] at [phone]  Should you ever need assistance or just want to reach out to someone when your behavioral health provider is not available due to the office being closed you can contact [email or web address]. Just text HOME to [phone] and someone will reach out to you within a few minutes.  This is a 24/7 help line and they are open holidays. Be sure and let us know as soon as our office opens so we can get you in for a follow up.  As always, go to the closest emergency room or call 911 if you feel you need immediate assistance.    Continue Wellbutrin 300 XL every morning  Continue lithium - 600mg BID, maintain adequate fluid intake  Continue BuSpar 10 mg 3 times a day.  Continue trazodone 50 mg tablet PRN insomnia  Contact Dr. Powell's office at [facility] to reschedule initial intake.
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
## 2024-07-01 ENCOUNTER — TELEPHONE (OUTPATIENT)
Dept: PSYCHIATRY | Facility: CLINIC | Age: 44
End: 2024-07-01
Payer: COMMERCIAL

## 2024-07-01 NOTE — TELEPHONE ENCOUNTER
Pt called and wanted to make provider aware. Pt states she is in the hospital.  Pt states she has been admitted and her Lithium level was to high it was 2.86 and she has Lithium toxicity. Pt went because she was having a hard time walking and now she can't even stand. Pt wanted to make provider aware she is not going to take the Lithium any longer.    Pt is in [place] but isn't sure what hospital she was transferred from [place].

## 2024-08-16 DIAGNOSIS — F31.10 BIPOLAR I DISORDER, MOST RECENT EPISODE MANIC: ICD-10-CM

## 2024-08-19 RX ORDER — TRAZODONE HYDROCHLORIDE 50 MG/1
50 TABLET ORAL NIGHTLY
Qty: 30 TABLET | Refills: 0 | Status: SHIPPED | OUTPATIENT
Start: 2024-08-19

## 2024-08-24 NOTE — PROGRESS NOTES
02/19/2018, Mark Farias, Medical Assistance for SCOTT Dudley notified of Prior Authorization denial regarding prescription medication, Linzess 145 MCG Capsule.  Facsimile received per [facility] stating patient must show trial and failure with preferred drug Lactulose (Enulose).   None

## 2024-09-16 DIAGNOSIS — F31.10 BIPOLAR I DISORDER, MOST RECENT EPISODE MANIC: ICD-10-CM

## 2024-09-16 RX ORDER — TRAZODONE HYDROCHLORIDE 50 MG/1
50 TABLET, FILM COATED ORAL NIGHTLY
Qty: 30 TABLET | Refills: 0 | Status: SHIPPED | OUTPATIENT
Start: 2024-09-16

## (undated) DEVICE — BITEBLOCK ENDO W/STRAP 60F A/ LF DISP

## (undated) DEVICE — CANN SMPL SOFTECH BIFLO ETCO2 A/M 7FT

## (undated) DEVICE — SINGLE-USE BIOPSY FORCEPS: Brand: RADIAL JAW 4